# Patient Record
Sex: MALE | Race: BLACK OR AFRICAN AMERICAN | NOT HISPANIC OR LATINO
[De-identification: names, ages, dates, MRNs, and addresses within clinical notes are randomized per-mention and may not be internally consistent; named-entity substitution may affect disease eponyms.]

---

## 2017-01-27 ENCOUNTER — APPOINTMENT (OUTPATIENT)
Dept: ORTHOPEDIC SURGERY | Facility: CLINIC | Age: 62
End: 2017-01-27

## 2017-02-23 ENCOUNTER — APPOINTMENT (OUTPATIENT)
Dept: ELECTROPHYSIOLOGY | Facility: CLINIC | Age: 62
End: 2017-02-23

## 2017-03-27 ENCOUNTER — APPOINTMENT (OUTPATIENT)
Dept: ELECTROPHYSIOLOGY | Facility: CLINIC | Age: 62
End: 2017-03-27

## 2017-05-31 ENCOUNTER — NON-APPOINTMENT (OUTPATIENT)
Age: 62
End: 2017-05-31

## 2017-05-31 ENCOUNTER — APPOINTMENT (OUTPATIENT)
Dept: ELECTROPHYSIOLOGY | Facility: CLINIC | Age: 62
End: 2017-05-31

## 2017-05-31 VITALS — DIASTOLIC BLOOD PRESSURE: 83 MMHG | HEART RATE: 72 BPM | OXYGEN SATURATION: 96 % | SYSTOLIC BLOOD PRESSURE: 131 MMHG

## 2017-07-25 ENCOUNTER — CHART COPY (OUTPATIENT)
Age: 62
End: 2017-07-25

## 2017-08-31 ENCOUNTER — APPOINTMENT (OUTPATIENT)
Dept: ELECTROPHYSIOLOGY | Facility: CLINIC | Age: 62
End: 2017-08-31
Payer: MEDICARE

## 2017-08-31 PROCEDURE — 93295 DEV INTERROG REMOTE 1/2/MLT: CPT

## 2017-09-06 ENCOUNTER — OUTPATIENT (OUTPATIENT)
Dept: OUTPATIENT SERVICES | Facility: HOSPITAL | Age: 62
LOS: 1 days | End: 2017-09-06
Payer: MEDICARE

## 2017-09-06 ENCOUNTER — APPOINTMENT (OUTPATIENT)
Dept: ULTRASOUND IMAGING | Facility: CLINIC | Age: 62
End: 2017-09-06
Payer: MEDICARE

## 2017-09-06 DIAGNOSIS — Z98.89 OTHER SPECIFIED POSTPROCEDURAL STATES: Chronic | ICD-10-CM

## 2017-09-06 DIAGNOSIS — Z95.810 PRESENCE OF AUTOMATIC (IMPLANTABLE) CARDIAC DEFIBRILLATOR: Chronic | ICD-10-CM

## 2017-09-06 DIAGNOSIS — R10.11 RIGHT UPPER QUADRANT PAIN: ICD-10-CM

## 2017-09-06 DIAGNOSIS — S55.001A UNSPECIFIED INJURY OF ULNAR ARTERY AT FOREARM LEVEL, RIGHT ARM, INITIAL ENCOUNTER: Chronic | ICD-10-CM

## 2017-09-06 PROCEDURE — 76700 US EXAM ABDOM COMPLETE: CPT | Mod: 26

## 2017-09-06 PROCEDURE — 76700 US EXAM ABDOM COMPLETE: CPT

## 2017-09-12 ENCOUNTER — APPOINTMENT (OUTPATIENT)
Dept: OTOLARYNGOLOGY | Facility: CLINIC | Age: 62
End: 2017-09-12
Payer: MEDICARE

## 2017-09-12 VITALS
DIASTOLIC BLOOD PRESSURE: 81 MMHG | BODY MASS INDEX: 28.93 KG/M2 | HEIGHT: 66 IN | HEART RATE: 71 BPM | WEIGHT: 180 LBS | SYSTOLIC BLOOD PRESSURE: 125 MMHG

## 2017-09-12 DIAGNOSIS — H93.12 TINNITUS, LEFT EAR: ICD-10-CM

## 2017-09-12 DIAGNOSIS — H61.21 IMPACTED CERUMEN, RIGHT EAR: ICD-10-CM

## 2017-09-12 PROCEDURE — 99214 OFFICE O/P EST MOD 30 MIN: CPT | Mod: 25

## 2017-09-12 PROCEDURE — 92567 TYMPANOMETRY: CPT

## 2017-09-12 PROCEDURE — 92557 COMPREHENSIVE HEARING TEST: CPT

## 2017-09-12 PROCEDURE — G0268 REMOVAL OF IMPACTED WAX MD: CPT

## 2017-10-02 ENCOUNTER — APPOINTMENT (OUTPATIENT)
Dept: OTOLARYNGOLOGY | Facility: CLINIC | Age: 62
End: 2017-10-02

## 2017-12-01 ENCOUNTER — APPOINTMENT (OUTPATIENT)
Dept: ELECTROPHYSIOLOGY | Facility: CLINIC | Age: 62
End: 2017-12-01
Payer: MEDICARE

## 2017-12-01 ENCOUNTER — NON-APPOINTMENT (OUTPATIENT)
Age: 62
End: 2017-12-01

## 2017-12-01 VITALS
DIASTOLIC BLOOD PRESSURE: 77 MMHG | HEART RATE: 74 BPM | OXYGEN SATURATION: 96 % | HEIGHT: 66 IN | SYSTOLIC BLOOD PRESSURE: 130 MMHG

## 2017-12-01 PROCEDURE — 93284 PRGRMG EVAL IMPLANTABLE DFB: CPT

## 2018-03-01 ENCOUNTER — APPOINTMENT (OUTPATIENT)
Dept: OTOLARYNGOLOGY | Facility: CLINIC | Age: 63
End: 2018-03-01
Payer: MEDICARE

## 2018-03-01 VITALS
SYSTOLIC BLOOD PRESSURE: 141 MMHG | HEART RATE: 92 BPM | WEIGHT: 182 LBS | DIASTOLIC BLOOD PRESSURE: 90 MMHG | HEIGHT: 66 IN | BODY MASS INDEX: 29.25 KG/M2

## 2018-03-01 DIAGNOSIS — R22.1 LOCALIZED SWELLING, MASS AND LUMP, NECK: ICD-10-CM

## 2018-03-01 DIAGNOSIS — K21.9 GASTRO-ESOPHAGEAL REFLUX DISEASE W/OUT ESOPHAGITIS: ICD-10-CM

## 2018-03-01 PROCEDURE — 99214 OFFICE O/P EST MOD 30 MIN: CPT | Mod: 25

## 2018-03-01 PROCEDURE — 31575 DIAGNOSTIC LARYNGOSCOPY: CPT

## 2018-03-06 ENCOUNTER — APPOINTMENT (OUTPATIENT)
Dept: ELECTROPHYSIOLOGY | Facility: CLINIC | Age: 63
End: 2018-03-06

## 2018-03-08 ENCOUNTER — APPOINTMENT (OUTPATIENT)
Dept: ELECTROPHYSIOLOGY | Facility: CLINIC | Age: 63
End: 2018-03-08
Payer: MEDICARE

## 2018-03-08 DIAGNOSIS — I50.9 HEART FAILURE, UNSPECIFIED: ICD-10-CM

## 2018-03-08 PROCEDURE — 93295 DEV INTERROG REMOTE 1/2/MLT: CPT

## 2018-03-20 ENCOUNTER — RESULT REVIEW (OUTPATIENT)
Age: 63
End: 2018-03-20

## 2018-03-20 ENCOUNTER — OUTPATIENT (OUTPATIENT)
Dept: OUTPATIENT SERVICES | Facility: HOSPITAL | Age: 63
LOS: 1 days | End: 2018-03-20
Payer: MEDICARE

## 2018-03-20 DIAGNOSIS — Z98.89 OTHER SPECIFIED POSTPROCEDURAL STATES: Chronic | ICD-10-CM

## 2018-03-20 DIAGNOSIS — Z95.810 PRESENCE OF AUTOMATIC (IMPLANTABLE) CARDIAC DEFIBRILLATOR: Chronic | ICD-10-CM

## 2018-03-20 DIAGNOSIS — D50.9 IRON DEFICIENCY ANEMIA, UNSPECIFIED: ICD-10-CM

## 2018-03-20 DIAGNOSIS — S55.001A UNSPECIFIED INJURY OF ULNAR ARTERY AT FOREARM LEVEL, RIGHT ARM, INITIAL ENCOUNTER: Chronic | ICD-10-CM

## 2018-03-20 PROCEDURE — 88312 SPECIAL STAINS GROUP 1: CPT

## 2018-03-20 PROCEDURE — 88305 TISSUE EXAM BY PATHOLOGIST: CPT | Mod: 26

## 2018-03-20 PROCEDURE — 43239 EGD BIOPSY SINGLE/MULTIPLE: CPT

## 2018-03-20 PROCEDURE — 88312 SPECIAL STAINS GROUP 1: CPT | Mod: 26

## 2018-03-20 PROCEDURE — 88305 TISSUE EXAM BY PATHOLOGIST: CPT

## 2018-03-21 LAB — SURGICAL PATHOLOGY STUDY: SIGNIFICANT CHANGE UP

## 2018-05-04 ENCOUNTER — APPOINTMENT (OUTPATIENT)
Dept: ORTHOPEDIC SURGERY | Facility: CLINIC | Age: 63
End: 2018-05-04
Payer: OTHER MISCELLANEOUS

## 2018-05-04 VITALS
WEIGHT: 182 LBS | SYSTOLIC BLOOD PRESSURE: 129 MMHG | DIASTOLIC BLOOD PRESSURE: 82 MMHG | BODY MASS INDEX: 29.25 KG/M2 | HEART RATE: 75 BPM | HEIGHT: 66 IN
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
## 2018-05-04 PROCEDURE — 99214 OFFICE O/P EST MOD 30 MIN: CPT

## 2018-06-04 ENCOUNTER — APPOINTMENT (OUTPATIENT)
Dept: ELECTROPHYSIOLOGY | Facility: CLINIC | Age: 63
End: 2018-06-04
Payer: MEDICARE

## 2018-06-04 VITALS
HEIGHT: 66 IN | DIASTOLIC BLOOD PRESSURE: 94 MMHG | SYSTOLIC BLOOD PRESSURE: 155 MMHG | WEIGHT: 182 LBS | HEART RATE: 70 BPM | OXYGEN SATURATION: 96 % | BODY MASS INDEX: 29.25 KG/M2

## 2018-06-04 PROCEDURE — 93284 PRGRMG EVAL IMPLANTABLE DFB: CPT

## 2018-06-04 RX ORDER — AMOXICILLIN AND CLAVULANATE POTASSIUM 875; 125 MG/1; MG/1
875-125 TABLET, COATED ORAL
Qty: 20 | Refills: 0 | Status: DISCONTINUED | COMMUNITY
Start: 2018-01-05 | End: 2018-06-04

## 2018-06-08 ENCOUNTER — CHART COPY (OUTPATIENT)
Age: 63
End: 2018-06-08

## 2018-09-13 ENCOUNTER — APPOINTMENT (OUTPATIENT)
Dept: ELECTROPHYSIOLOGY | Facility: CLINIC | Age: 63
End: 2018-09-13
Payer: MEDICARE

## 2018-09-13 DIAGNOSIS — M50.20 OTHER CERVICAL DISC DISPLACEMENT, UNSPECIFIED CERVICAL REGION: ICD-10-CM

## 2018-09-13 PROCEDURE — 93294 REM INTERROG EVL PM/LDLS PM: CPT

## 2018-12-04 ENCOUNTER — APPOINTMENT (OUTPATIENT)
Dept: ELECTROPHYSIOLOGY | Facility: CLINIC | Age: 63
End: 2018-12-04
Payer: MEDICARE

## 2018-12-04 VITALS
OXYGEN SATURATION: 96 % | HEIGHT: 66 IN | BODY MASS INDEX: 29.73 KG/M2 | SYSTOLIC BLOOD PRESSURE: 157 MMHG | WEIGHT: 185 LBS | HEART RATE: 64 BPM | DIASTOLIC BLOOD PRESSURE: 92 MMHG

## 2018-12-04 PROCEDURE — 93284 PRGRMG EVAL IMPLANTABLE DFB: CPT

## 2019-04-11 ENCOUNTER — MEDICATION RENEWAL (OUTPATIENT)
Age: 64
End: 2019-04-11

## 2019-05-14 ENCOUNTER — APPOINTMENT (OUTPATIENT)
Dept: ENDOCRINOLOGY | Facility: CLINIC | Age: 64
End: 2019-05-14
Payer: MEDICARE

## 2019-05-14 VITALS
WEIGHT: 184.19 LBS | OXYGEN SATURATION: 97 % | TEMPERATURE: 98.6 F | HEIGHT: 66 IN | DIASTOLIC BLOOD PRESSURE: 84 MMHG | HEART RATE: 72 BPM | BODY MASS INDEX: 29.6 KG/M2 | SYSTOLIC BLOOD PRESSURE: 152 MMHG

## 2019-05-14 PROCEDURE — 82962 GLUCOSE BLOOD TEST: CPT

## 2019-05-14 PROCEDURE — 99214 OFFICE O/P EST MOD 30 MIN: CPT | Mod: 25

## 2019-05-14 PROCEDURE — 83036 HEMOGLOBIN GLYCOSYLATED A1C: CPT | Mod: QW

## 2019-05-14 RX ORDER — IBUPROFEN 800 MG/1
800 TABLET, FILM COATED ORAL EVERY 8 HOURS
Qty: 90 | Refills: 1 | Status: DISCONTINUED | COMMUNITY
Start: 2017-07-25 | End: 2019-05-14

## 2019-05-14 RX ORDER — METHYLPREDNISOLONE 4 MG/1
4 TABLET ORAL
Qty: 1 | Refills: 0 | Status: DISCONTINUED | COMMUNITY
Start: 2018-03-01 | End: 2019-05-14

## 2019-05-14 RX ORDER — PROMETHAZINE AND PHENYLEPHRINE HYDROCHLORIDE AND CODEINE PHOSPHATE 10; 6.25; 5 MG/5ML; MG/5ML; MG/5ML
6.25-5-1 SOLUTION ORAL
Qty: 120 | Refills: 0 | Status: DISCONTINUED | COMMUNITY
Start: 2018-01-05 | End: 2019-05-14

## 2019-05-14 RX ORDER — BENZONATATE 200 MG/1
200 CAPSULE ORAL
Qty: 40 | Refills: 0 | Status: DISCONTINUED | COMMUNITY
Start: 2018-01-05 | End: 2019-05-14

## 2019-05-21 ENCOUNTER — APPOINTMENT (OUTPATIENT)
Dept: CARDIOLOGY | Facility: CLINIC | Age: 64
End: 2019-05-21
Payer: MEDICARE

## 2019-05-21 ENCOUNTER — NON-APPOINTMENT (OUTPATIENT)
Age: 64
End: 2019-05-21

## 2019-05-21 VITALS
OXYGEN SATURATION: 98 % | HEIGHT: 66 IN | HEART RATE: 71 BPM | TEMPERATURE: 98.5 F | DIASTOLIC BLOOD PRESSURE: 80 MMHG | WEIGHT: 183.3 LBS | SYSTOLIC BLOOD PRESSURE: 130 MMHG | BODY MASS INDEX: 29.46 KG/M2

## 2019-05-21 DIAGNOSIS — R70.0 ELEVATED ERYTHROCYTE SEDIMENTATION RATE: ICD-10-CM

## 2019-05-21 PROCEDURE — 99213 OFFICE O/P EST LOW 20 MIN: CPT

## 2019-05-21 PROCEDURE — 93000 ELECTROCARDIOGRAM COMPLETE: CPT

## 2019-05-21 NOTE — HISTORY OF PRESENT ILLNESS
[de-identified] : This is a 64 year old  gentlemen who has a PMH of HTN, HLD, DM, Cardiomyopathy, and Defibrillator in place. Patient states he is feeling good and has no complaints at this time. Patient denies dyspnea, palpitations, chest pain, nausea, vomiting, dizziness and lightheadedness.\par

## 2019-05-26 LAB
ANION GAP SERPL CALC-SCNC: 14 MMOL/L
APPEARANCE: CLEAR
BACTERIA: NEGATIVE
BASOPHILS # BLD AUTO: 0.01 K/UL
BASOPHILS NFR BLD AUTO: 0.3 %
BILIRUBIN URINE: NEGATIVE
BLOOD URINE: NEGATIVE
BUN SERPL-MCNC: 21 MG/DL
CALCIUM SERPL-MCNC: 10.2 MG/DL
CHLORIDE SERPL-SCNC: 101 MMOL/L
CK SERPL-CCNC: 242 U/L
CO2 SERPL-SCNC: 26 MMOL/L
COLOR: NORMAL
CREAT SERPL-MCNC: 1.24 MG/DL
CREAT SPEC-SCNC: 99 MG/DL
EOSINOPHIL # BLD AUTO: 0.07 K/UL
EOSINOPHIL NFR BLD AUTO: 1.8 %
GLUCOSE QUALITATIVE U: ABNORMAL
GLUCOSE SERPL-MCNC: 157 MG/DL
HCT VFR BLD CALC: 50.1 %
HGB BLD-MCNC: 16.5 G/DL
HYALINE CASTS: 1 /LPF
IMM GRANULOCYTES NFR BLD AUTO: 0.3 %
KETONES URINE: NEGATIVE
LEUKOCYTE ESTERASE URINE: NEGATIVE
LYMPHOCYTES # BLD AUTO: 1.51 K/UL
LYMPHOCYTES NFR BLD AUTO: 37.8 %
MAN DIFF?: NORMAL
MCHC RBC-ENTMCNC: 30 PG
MCHC RBC-ENTMCNC: 32.9 GM/DL
MCV RBC AUTO: 91.1 FL
MICROALBUMIN 24H UR DL<=1MG/L-MCNC: 2.3 MG/DL
MICROALBUMIN/CREAT 24H UR-RTO: 23 MG/G
MICROSCOPIC-UA: NORMAL
MONOCYTES # BLD AUTO: 0.64 K/UL
MONOCYTES NFR BLD AUTO: 16 %
NEUTROPHILS # BLD AUTO: 1.76 K/UL
NEUTROPHILS NFR BLD AUTO: 43.8 %
NITRITE URINE: NEGATIVE
PH URINE: 6
PLATELET # BLD AUTO: 150 K/UL
POTASSIUM SERPL-SCNC: 4.7 MMOL/L
PROTEIN URINE: NORMAL
RBC # BLD: 5.5 M/UL
RBC # FLD: 14.3 %
RED BLOOD CELLS URINE: 0 /HPF
SODIUM SERPL-SCNC: 141 MMOL/L
SPECIFIC GRAVITY URINE: 1.02
SQUAMOUS EPITHELIAL CELLS: 0 /HPF
UROBILINOGEN URINE: NORMAL
WBC # FLD AUTO: 4 K/UL
WHITE BLOOD CELLS URINE: 1 /HPF

## 2019-05-30 ENCOUNTER — RX RENEWAL (OUTPATIENT)
Age: 64
End: 2019-05-30

## 2019-05-31 LAB
25(OH)D3 SERPL-MCNC: 30.3 NG/ML
ALBUMIN SERPL ELPH-MCNC: 4.6 G/DL
ALP BLD-CCNC: 61 U/L
ALT SERPL-CCNC: 23 U/L
ANION GAP SERPL CALC-SCNC: 14 MMOL/L
AST SERPL-CCNC: 24 U/L
BILIRUB SERPL-MCNC: 0.6 MG/DL
BUN SERPL-MCNC: 14 MG/DL
CALCIT SERPL-MCNC: 5.3 PG/ML
CALCIUM SERPL-MCNC: 9.8 MG/DL
CHLORIDE SERPL-SCNC: 100 MMOL/L
CO2 SERPL-SCNC: 27 MMOL/L
CREAT SERPL-MCNC: 0.97 MG/DL
CREAT SPEC-SCNC: <2 MG/DL
ESTIMATED AVERAGE GLUCOSE: 192 MG/DL
FRUCTOSAMINE SERPL-MCNC: 367 UMOL/L
GLUCOSE BLDC GLUCOMTR-MCNC: 126
GLUCOSE SERPL-MCNC: 120 MG/DL
GLYCOMARK.: 3.2 UG/ML
HBA1C MFR BLD HPLC: 7.9
HBA1C MFR BLD HPLC: 8.3 %
HDLC SERPL-MCNC: 69 MG/DL
LDLC SERPL DIRECT ASSAY-MCNC: 135 MG/DL
MICROALBUMIN 24H UR DL<=1MG/L-MCNC: <1.2 MG/DL
MICROALBUMIN/CREAT 24H UR-RTO: NORMAL MG/G
POTASSIUM SERPL-SCNC: 4.6 MMOL/L
PROT SERPL-MCNC: 7.6 G/DL
SODIUM SERPL-SCNC: 141 MMOL/L
T4 FREE SERPL-MCNC: 1.3 NG/DL
TRIGL SERPL-MCNC: 86 MG/DL
TSH SERPL-ACNC: 2.33 UIU/ML

## 2019-06-04 ENCOUNTER — APPOINTMENT (OUTPATIENT)
Dept: ELECTROPHYSIOLOGY | Facility: CLINIC | Age: 64
End: 2019-06-04
Payer: MEDICARE

## 2019-06-04 ENCOUNTER — APPOINTMENT (OUTPATIENT)
Dept: ELECTROPHYSIOLOGY | Facility: CLINIC | Age: 64
End: 2019-06-04

## 2019-06-04 VITALS
HEIGHT: 66 IN | SYSTOLIC BLOOD PRESSURE: 129 MMHG | WEIGHT: 180 LBS | HEART RATE: 73 BPM | BODY MASS INDEX: 28.93 KG/M2 | OXYGEN SATURATION: 96 % | DIASTOLIC BLOOD PRESSURE: 80 MMHG

## 2019-06-04 PROCEDURE — 93284 PRGRMG EVAL IMPLANTABLE DFB: CPT

## 2019-06-15 LAB
APPEARANCE: CLEAR
BILIRUBIN URINE: NEGATIVE
BLOOD URINE: NEGATIVE
COLOR: NORMAL
CREAT SPEC-SCNC: 128 MG/DL
GLUCOSE QUALITATIVE U: NORMAL
KETONES URINE: NEGATIVE
LEUKOCYTE ESTERASE URINE: NEGATIVE
MICROALBUMIN 24H UR DL<=1MG/L-MCNC: <1.2 MG/DL
MICROALBUMIN/CREAT 24H UR-RTO: NORMAL MG/G
NITRITE URINE: NEGATIVE
PH URINE: 6
PROTEIN URINE: NEGATIVE
SPECIFIC GRAVITY URINE: 1.01
UROBILINOGEN URINE: NORMAL

## 2019-07-01 ENCOUNTER — APPOINTMENT (OUTPATIENT)
Dept: ELECTROPHYSIOLOGY | Facility: CLINIC | Age: 64
End: 2019-07-01
Payer: MEDICARE

## 2019-07-01 ENCOUNTER — NON-APPOINTMENT (OUTPATIENT)
Age: 64
End: 2019-07-01

## 2019-07-01 VITALS
DIASTOLIC BLOOD PRESSURE: 81 MMHG | HEIGHT: 66 IN | HEART RATE: 81 BPM | SYSTOLIC BLOOD PRESSURE: 135 MMHG | OXYGEN SATURATION: 95 %

## 2019-07-01 PROCEDURE — 99205 OFFICE O/P NEW HI 60 MIN: CPT | Mod: 25

## 2019-07-01 PROCEDURE — 93000 ELECTROCARDIOGRAM COMPLETE: CPT | Mod: 59

## 2019-07-01 PROCEDURE — 93284 PRGRMG EVAL IMPLANTABLE DFB: CPT

## 2019-07-01 RX ORDER — OLMESARTAN MEDOXOMIL 20 MG/1
20 TABLET, FILM COATED ORAL
Qty: 90 | Refills: 0 | Status: DISCONTINUED | COMMUNITY
Start: 2019-04-11 | End: 2019-07-01

## 2019-07-01 RX ORDER — CHROMIUM 200 MCG
1000 TABLET ORAL DAILY
Refills: 0 | Status: ACTIVE | COMMUNITY
Start: 2019-07-01

## 2019-07-03 NOTE — HISTORY OF PRESENT ILLNESS
[FreeTextEntry1] : Mr. LO  is a 64 year year old male who returns today for endocrine reevaluation.  Patient returns with regard to  a history of type 2 dm present for about 18 months  Bg at home run inm low 100's..  Additional history includes that of htn  and hyperlipidemia\par Today's A1c was 7.9%. Diet not overloaded in carbs except rice\par ADmits he doesn’t exercise enough. Is to join gym. Opthp due i nJuly-no retinal history.\par History of Vitamin d d deficiency.\par \par going to [facility] to visit home.\par Notes some burning feet.\par

## 2019-07-05 NOTE — DISCUSSION/SUMMARY
[FreeTextEntry1] : In summary the patient is a 64-year-old gentleman with a history of cardiomyopathy and heart failure status post placement of a biventricular ICD. The patient has done well with recent synchronization. He is now demonstrated noise on his atrial and ventricular ICD lead and his LV lead is actually on advisory. We discussed the potential options of doing nothing, and abandoning leads and replacing them, or extraction with reimplantation. The patient already has 3 Leads in Pl. and abandoning his atrial and ICD lead and leaving the LV lead which is on advisory, and at the same time adding to her leads would give him 5 leads through his SVC. We discussed the procedures, outcomes and risks of extraction at length. The risks discussed included but were not limited to bleeding, infection, vascular damage, AV fistula, cardiac damage, valvular damage, need for emergent surgery and death. After all questions were answered the patient has opted to proceed with extraction and reimplantation.

## 2019-07-05 NOTE — REASON FOR VISIT
[Initial Evaluation] : an initial evaluation of [Cardiomyopathy] : cardiomyopathy [FreeTextEntry1] : BiV ICD

## 2019-07-05 NOTE — HISTORY OF PRESENT ILLNESS
[FreeTextEntry1] : I had the pleasure of seeing your patient Yash Camara today in the arrhythmia clinic of [facility]. As you well know the patient is a delightful 64-year-old gentleman with a history of hypertension, hyperlipidemia, diabetes, and dilated cardiomyopathy. The patient also has a history of congestive heart failure. He has an underlying left bundle branch block and underwent implantation of a biventricular ICD in 2009. In followup the patient was found to have significant atrial lead noise in addition ventricular lead noise. He was therefore referred for evaluation for extraction and reimplantation. His last echocardiogram was in April of 2018. He had mild concentric LVH with a left atrium of 4.2 cm. his ejection fraction had improved to 45-50% with Biv pacing. \par \par ICD: [employer] Harj[employer] 3357-40, implanted 9/17/15, serial number [id number]\par Atrial lead: [employer] 1888 TC, serial number [id number] implanted 9/17/09\par ICD lead: [employer] Harj[employer]'s 7121, serial [id number], implanted 9/17/09\par LV lead: [employer] Harjinder 1156T, serial [id number], implanted 9/17/09\par \par Today in clinic the patient reports overall doing well. He denies any palpitations, lightheadedness, presyncope or syncope. His blood pressure today was 135/81 and his pulse was 81 and regular appears EKG revealed atrial sensing with IV pacing at 80 beats per minute. Interrogation of his biventricular [employer] Harjinder defibrillator reveals a smoking normally. The battery voltage estimates longevity of 3 years. The P wave was 4.6 mV with impedance of 360 ohms threshold 0.75 of 0.5 ms. The R wave is greater than 12 mV with an RV impedance of 700 ohms threshold 0.5 V at chair 0.5 ms. The LV threshold was 1.25 V at 1 ms. He is bright V. pacing greater than 99% of the time and there were no interval arrhythmic events.

## 2019-07-22 ENCOUNTER — OUTPATIENT (OUTPATIENT)
Dept: OUTPATIENT SERVICES | Facility: HOSPITAL | Age: 64
LOS: 1 days | End: 2019-07-22
Payer: MEDICARE

## 2019-07-22 ENCOUNTER — TRANSCRIPTION ENCOUNTER (OUTPATIENT)
Age: 64
End: 2019-07-22

## 2019-07-22 VITALS
WEIGHT: 182.1 LBS | TEMPERATURE: 98 F | DIASTOLIC BLOOD PRESSURE: 93 MMHG | HEART RATE: 78 BPM | OXYGEN SATURATION: 97 % | HEIGHT: 66 IN | RESPIRATION RATE: 18 BRPM | SYSTOLIC BLOOD PRESSURE: 157 MMHG

## 2019-07-22 DIAGNOSIS — Z95.810 PRESENCE OF AUTOMATIC (IMPLANTABLE) CARDIAC DEFIBRILLATOR: Chronic | ICD-10-CM

## 2019-07-22 DIAGNOSIS — S55.001A UNSPECIFIED INJURY OF ULNAR ARTERY AT FOREARM LEVEL, RIGHT ARM, INITIAL ENCOUNTER: Chronic | ICD-10-CM

## 2019-07-22 DIAGNOSIS — Z29.9 ENCOUNTER FOR PROPHYLACTIC MEASURES, UNSPECIFIED: ICD-10-CM

## 2019-07-22 DIAGNOSIS — G47.33 OBSTRUCTIVE SLEEP APNEA (ADULT) (PEDIATRIC): ICD-10-CM

## 2019-07-22 DIAGNOSIS — Z98.89 OTHER SPECIFIED POSTPROCEDURAL STATES: Chronic | ICD-10-CM

## 2019-07-22 DIAGNOSIS — T82.110A BREAKDOWN (MECHANICAL) OF CARDIAC ELECTRODE, INITIAL ENCOUNTER: ICD-10-CM

## 2019-07-22 LAB
ANION GAP SERPL CALC-SCNC: 16 MMOL/L — SIGNIFICANT CHANGE UP (ref 5–17)
BLD GP AB SCN SERPL QL: NEGATIVE — SIGNIFICANT CHANGE UP
BUN SERPL-MCNC: 19 MG/DL — SIGNIFICANT CHANGE UP (ref 7–23)
CALCIUM SERPL-MCNC: 9.5 MG/DL — SIGNIFICANT CHANGE UP (ref 8.4–10.5)
CHLORIDE SERPL-SCNC: 103 MMOL/L — SIGNIFICANT CHANGE UP (ref 96–108)
CO2 SERPL-SCNC: 24 MMOL/L — SIGNIFICANT CHANGE UP (ref 22–31)
CREAT SERPL-MCNC: 1.03 MG/DL — SIGNIFICANT CHANGE UP (ref 0.5–1.3)
GLUCOSE SERPL-MCNC: 206 MG/DL — HIGH (ref 70–99)
HCT VFR BLD CALC: 47.8 % — SIGNIFICANT CHANGE UP (ref 39–50)
HGB BLD-MCNC: 16.3 G/DL — SIGNIFICANT CHANGE UP (ref 13–17)
MCHC RBC-ENTMCNC: 30.1 PG — SIGNIFICANT CHANGE UP (ref 27–34)
MCHC RBC-ENTMCNC: 34.1 GM/DL — SIGNIFICANT CHANGE UP (ref 32–36)
MCV RBC AUTO: 88.4 FL — SIGNIFICANT CHANGE UP (ref 80–100)
PLATELET # BLD AUTO: 181 K/UL — SIGNIFICANT CHANGE UP (ref 150–400)
POTASSIUM SERPL-MCNC: 4.3 MMOL/L — SIGNIFICANT CHANGE UP (ref 3.5–5.3)
POTASSIUM SERPL-SCNC: 4.3 MMOL/L — SIGNIFICANT CHANGE UP (ref 3.5–5.3)
RBC # BLD: 5.41 M/UL — SIGNIFICANT CHANGE UP (ref 4.2–5.8)
RBC # FLD: 15.1 % — HIGH (ref 10.3–14.5)
RH IG SCN BLD-IMP: POSITIVE — SIGNIFICANT CHANGE UP
SODIUM SERPL-SCNC: 143 MMOL/L — SIGNIFICANT CHANGE UP (ref 135–145)
WBC # BLD: 3.24 K/UL — LOW (ref 3.8–10.5)
WBC # FLD AUTO: 3.24 K/UL — LOW (ref 3.8–10.5)

## 2019-07-22 PROCEDURE — 80048 BASIC METABOLIC PNL TOTAL CA: CPT

## 2019-07-22 PROCEDURE — 85027 COMPLETE CBC AUTOMATED: CPT

## 2019-07-22 PROCEDURE — 86901 BLOOD TYPING SEROLOGIC RH(D): CPT

## 2019-07-22 PROCEDURE — 86900 BLOOD TYPING SEROLOGIC ABO: CPT

## 2019-07-22 PROCEDURE — 71046 X-RAY EXAM CHEST 2 VIEWS: CPT | Mod: 26

## 2019-07-22 PROCEDURE — 86850 RBC ANTIBODY SCREEN: CPT

## 2019-07-22 PROCEDURE — 86923 COMPATIBILITY TEST ELECTRIC: CPT

## 2019-07-22 PROCEDURE — 71046 X-RAY EXAM CHEST 2 VIEWS: CPT

## 2019-07-22 PROCEDURE — G0463: CPT

## 2019-07-22 NOTE — H&P PST ADULT - NSANTHOSAYNRD_GEN_A_CORE
No. YAYO screening performed.  STOP BANG Legend: 0-2 = LOW Risk; 3-4 = INTERMEDIATE Risk; 5-8 = HIGH Risk No. YAYO screening performed.  STOP BANG Legend: 0-2 = LOW Risk; 3-4 = INTERMEDIATE Risk; 5-8 = HIGH Risk/criteria

## 2019-07-22 NOTE — H&P PST ADULT - ASSESSMENT
1.	Right Upper Quadrant Abdominal Pain  2.	Long term aspirin use  3.	AICD in place CAPRINI SCORE [CLOT updated 18]    AGE RELATED RISK FACTORS                                                       MOBILITY RELATED FACTORS  [ ] Age 41-60 years                                            (1 Point)                    [ ] Bed rest                                                        (1 Point)  x[ ] Age: 61-74 years                                           (2 Points)                  [ ] Plaster cast                                                   (2 Points)  [ ] Age= 75 years                                              (3 Points)                    [ ] Bed bound for more than 72 hours                 (2 Points)    DISEASE RELATED RISK FACTORS                                               GENDER SPECIFIC FACTORS  [ ] Edema in the lower extremities                       (1 Point)              [ ] Pregnancy                                                     (1 Point)  [ ] Varicose veins                                               (1 Point)                     [ ] Post-partum < 6 weeks                                   (1 Point)             [x ] BMI > 25 Kg/m2                                            (1 Point)                     [ ] Hormonal therapy  or oral contraception          (1 Point)                 [ ] Sepsis (in the previous month)                        (1 Point)               [ ] History of pregnancy complications                 (1 point)  [ ] Pneumonia or serious lung disease                                               [ ] Unexplained or recurrent                     (1 Point)           (in the previous month)                               (1 Point)  [ ] Abnormal pulmonary function test                     (1 Point)                 SURGERY RELATED RISK FACTORS  [ ] Acute myocardial infarction                              (1 Point)               [ ]  Section                                             (1 Point)  [ ] Congestive heart failure (in the previous month)  (1 Point)      [ ] Minor surgery                                                  (1 Point)   [ ] Inflammatory bowel disease                             (1 Point)               [ ] Arthroscopic surgery                                        (2 Points)  [ ] Central venous access                                      (2 Points)                [x ] General surgery lasting more than 45 minutes (2 points)  [ ] Present or previous malignancy                     (2 Points)                [ ] Elective arthroplasty                                         (5 points)    [ ] Stroke (in the previous month)                          (5 Points)                                                                                                                                                           HEMATOLOGY RELATED FACTORS                                                 TRAUMA RELATED RISK FACTORS  [ ] Prior episodes of VTE                                     (3 Points)                [ ] Fracture of the hip, pelvis, or leg                       (5 Points)  [ ] Positive family history for VTE                         (3 Points)             [ ] Acute spinal cord injury (in the previous month)  (5 Points)  [ ] Prothrombin 32777 A                                     (3 Points)               [ ] Paralysis  (less than 1 month)                             (5 Points)  [ ] Factor V Leiden                                             (3 Points)                  [ ] Multiple Trauma within 1 month                        (5 Points)  [ ] Lupus anticoagulants                                     (3 Points)                                                           [ ] Anticardiolipin antibodies                               (3 Points)                                                       [ ] High homocysteine in the blood                      (3 Points)                                             [ ] Other congenital or acquired thrombophilia      (3 Points)                                                [ ] Heparin induced thrombocytopenia                  (3 Points)                                     Total Score [      5    ]

## 2019-07-22 NOTE — H&P PST ADULT - EJECTION FRACTION >40 YES
Otc Regimen: Lamisil cream Plan: -patient states she has been using Triamcinolone oint bid \\n-patient states she is still very itchy and bumps are still present at today's visit \\n-picture taken at today's visit \\n-will do a bx at today's visit \\n-advised patient this could be fungal \\n-advised patient to stop Triamcinolone oint at today's visit\\n-recommended OTC Lamisil cream \\n-will then treat rash when we receive bx results Detail Level: Zone Ejection Fraction...

## 2019-07-22 NOTE — H&P PST ADULT - NSICDXPASTMEDICALHX_GEN_ALL_CORE_FT
PAST MEDICAL HISTORY:  Cardiomyopathy     Cardiomyopathy AICD in place    Diverticulitis past history    Diverticulosis     Dyslipidemia     GERD (gastroesophageal reflux disease)     Hyperlipidemia     Hypertension     Hypertension     Lumbar radiculopathy PAST MEDICAL HISTORY:  Cardiomyopathy     Cardiomyopathy       Diverticulitis past history    Diverticulosis     Dyslipidemia     GERD (gastroesophageal reflux disease)     Hyperlipidemia     Hypertension     Hypertension     Lumbar radiculopathy PAST MEDICAL HISTORY:  Cardiomyopathy     Cardiomyopathy       Diverticulitis past history    Diverticulosis     Dyslipidemia     GERD (gastroesophageal reflux disease)     History of hemochromatosis ? pt followed by hematologist no treatmnet -lab values is normal as per patient    Hyperlipidemia     Hypertension     Hypertension     Lumbar radiculopathy

## 2019-07-22 NOTE — H&P PST ADULT - LAST ECHOCARDIOGRAM
2018 2018-His last echocardiogram was in April of 2018. He had mild concentric LVH with a left atrium of 4.2 cm. his ejection fraction had improved to 45-50% with Biv pacing.

## 2019-07-22 NOTE — H&P PST ADULT - NSICDXPROBLEM_GEN_ALL_CORE_FT
PROBLEM DIAGNOSES  Problem: Mechanical breakdown of cardiac electrode, initial encounter  Assessment and Plan: BIV ICD Lead Extraction and Reimplant on 7/23/19  Pre- Op Instructions discussed   Labs sent   PPM last interrogation report on file     Problem: YAYO (obstructive sleep apnea)  Assessment and Plan: OR booking notified  yayo precautions     Problem: Need for prophylactic measure  Assessment and Plan: The Caprini score indicates this patient is at risk for a VTE event (score 3-5).  Most surgical patients in this group would benefit from pharmacologic prophylaxis.  The surgical team will determine the balance between VTE risk and bleeding risk

## 2019-07-22 NOTE — H&P PST ADULT - ACTIVITY
ADLs, walks up 1-2 flights of stairs, rakes leaves, walks 15 min at moderate pace - activity somewhat limited by LBP

## 2019-07-22 NOTE — H&P PST ADULT - HISTORY OF PRESENT ILLNESS
63 yo male with h/o HTN, HLD, cardiomyopathy (AICD in place) and c/o intermittent right upper quadrant abdominal pain presents for endoscopy and colonoscopy. 64-year-old gentleman with a history of hypertension, hyperlipidemia, pre-diabetes, and dilated cardiomyopathy. The patient also has a history of congestive heart failure. He has an underlying left bundle branch block and underwent implantation of a biventricular ICD in 2009 ,battery changed in 2015. In follow up the patient was found to have significant atrial lead noise in addition ventricular lead noise. He was therefore referred for evaluation for extraction and reimplantation. Presents to PST for scheduled BIV ICD Lead Extraction and Re Implant on 7/23/19.      His last echocardiogram was in April of 2018. He had mild concentric LVH with a left atrium of 4.2 cm. his ejection fraction had improved to 45-50% with Biv pacing. 64-year-old gentleman with a history of hypertension, hyperlipidemia, pre-diabetes, and dilated cardiomyopathy. The patient also has a history of congestive heart failure. He has an underlying left bundle branch block and underwent implantation of a biventricular ICD in 2009 ,battery changed in 2015. In follow up the patient was found to have significant atrial lead noise in addition ventricular lead noise. He was therefore referred for evaluation for extraction and reimplantation. Presents to PST for scheduled BIV ICD Lead Extraction and Re Implant on 7/23/19.

## 2019-07-22 NOTE — H&P PST ADULT - NSICDXPASTSURGICALHX_GEN_ALL_CORE_FT
PAST SURGICAL HISTORY:  AICD (automatic cardioverter/defibrillator) present implanted 9/2009  left chest   model # 3207- 36    AICD (automatic cardioverter/defibrillator) present 2009, battery change 9/2015    History of lumbar laminectomy x2 - 2000, 2001 - s/p accident at work    Injury of right ulnar artery s/p surgical repair 2009

## 2019-07-23 ENCOUNTER — INPATIENT (INPATIENT)
Facility: HOSPITAL | Age: 64
LOS: 1 days | Discharge: HOME | DRG: 226 | End: 2019-07-25
Attending: INTERNAL MEDICINE | Admitting: INTERNAL MEDICINE
Payer: MEDICARE

## 2019-07-23 ENCOUNTER — TRANSCRIPTION ENCOUNTER (OUTPATIENT)
Age: 64
End: 2019-07-23

## 2019-07-23 VITALS
SYSTOLIC BLOOD PRESSURE: 147 MMHG | TEMPERATURE: 98 F | DIASTOLIC BLOOD PRESSURE: 79 MMHG | WEIGHT: 182.76 LBS | RESPIRATION RATE: 20 BRPM | OXYGEN SATURATION: 96 % | HEART RATE: 77 BPM | HEIGHT: 66 IN

## 2019-07-23 DIAGNOSIS — T82.110A BREAKDOWN (MECHANICAL) OF CARDIAC ELECTRODE, INITIAL ENCOUNTER: ICD-10-CM

## 2019-07-23 DIAGNOSIS — Z95.810 PRESENCE OF AUTOMATIC (IMPLANTABLE) CARDIAC DEFIBRILLATOR: Chronic | ICD-10-CM

## 2019-07-23 DIAGNOSIS — S55.001A UNSPECIFIED INJURY OF ULNAR ARTERY AT FOREARM LEVEL, RIGHT ARM, INITIAL ENCOUNTER: Chronic | ICD-10-CM

## 2019-07-23 DIAGNOSIS — Z98.89 OTHER SPECIFIED POSTPROCEDURAL STATES: Chronic | ICD-10-CM

## 2019-07-23 LAB
ALBUMIN SERPL ELPH-MCNC: 3.6 G/DL — SIGNIFICANT CHANGE UP (ref 3.3–5)
ALP SERPL-CCNC: 37 U/L — LOW (ref 40–120)
ALT FLD-CCNC: 15 U/L — SIGNIFICANT CHANGE UP (ref 10–45)
ANION GAP SERPL CALC-SCNC: 12 MMOL/L — SIGNIFICANT CHANGE UP (ref 5–17)
APTT BLD: 24.5 SEC — LOW (ref 27.5–36.3)
AST SERPL-CCNC: 27 U/L — SIGNIFICANT CHANGE UP (ref 10–40)
BASOPHILS # BLD AUTO: 0 K/UL — SIGNIFICANT CHANGE UP (ref 0–0.2)
BASOPHILS NFR BLD AUTO: 1.1 % — SIGNIFICANT CHANGE UP (ref 0–2)
BILIRUB SERPL-MCNC: 0.8 MG/DL — SIGNIFICANT CHANGE UP (ref 0.2–1.2)
BUN SERPL-MCNC: 16 MG/DL — SIGNIFICANT CHANGE UP (ref 7–23)
CALCIUM SERPL-MCNC: 8.7 MG/DL — SIGNIFICANT CHANGE UP (ref 8.4–10.5)
CHLORIDE SERPL-SCNC: 108 MMOL/L — SIGNIFICANT CHANGE UP (ref 96–108)
CO2 SERPL-SCNC: 22 MMOL/L — SIGNIFICANT CHANGE UP (ref 22–31)
CREAT SERPL-MCNC: 0.94 MG/DL — SIGNIFICANT CHANGE UP (ref 0.5–1.3)
EOSINOPHIL # BLD AUTO: 0.1 K/UL — SIGNIFICANT CHANGE UP (ref 0–0.5)
EOSINOPHIL NFR BLD AUTO: 1.6 % — SIGNIFICANT CHANGE UP (ref 0–6)
GLUCOSE SERPL-MCNC: 149 MG/DL — HIGH (ref 70–99)
HCT VFR BLD CALC: 41.9 % — SIGNIFICANT CHANGE UP (ref 39–50)
HGB BLD-MCNC: 14.6 G/DL — SIGNIFICANT CHANGE UP (ref 13–17)
INR BLD: 1.03 RATIO — SIGNIFICANT CHANGE UP (ref 0.88–1.16)
LYMPHOCYTES # BLD AUTO: 0.9 K/UL — LOW (ref 1–3.3)
LYMPHOCYTES # BLD AUTO: 19.3 % — SIGNIFICANT CHANGE UP (ref 13–44)
MAGNESIUM SERPL-MCNC: 1.9 MG/DL — SIGNIFICANT CHANGE UP (ref 1.6–2.6)
MCHC RBC-ENTMCNC: 31.8 PG — SIGNIFICANT CHANGE UP (ref 27–34)
MCHC RBC-ENTMCNC: 35 GM/DL — SIGNIFICANT CHANGE UP (ref 32–36)
MCV RBC AUTO: 91 FL — SIGNIFICANT CHANGE UP (ref 80–100)
MONOCYTES # BLD AUTO: 0.5 K/UL — SIGNIFICANT CHANGE UP (ref 0–0.9)
MONOCYTES NFR BLD AUTO: 11.6 % — SIGNIFICANT CHANGE UP (ref 2–14)
NEUTROPHILS # BLD AUTO: 3 K/UL — SIGNIFICANT CHANGE UP (ref 1.8–7.4)
NEUTROPHILS NFR BLD AUTO: 66.4 % — SIGNIFICANT CHANGE UP (ref 43–77)
PHOSPHATE SERPL-MCNC: 4.3 MG/DL — SIGNIFICANT CHANGE UP (ref 2.5–4.5)
PLATELET # BLD AUTO: 135 K/UL — LOW (ref 150–400)
POTASSIUM SERPL-MCNC: 4.3 MMOL/L — SIGNIFICANT CHANGE UP (ref 3.5–5.3)
POTASSIUM SERPL-SCNC: 4.3 MMOL/L — SIGNIFICANT CHANGE UP (ref 3.5–5.3)
PROT SERPL-MCNC: 6 G/DL — SIGNIFICANT CHANGE UP (ref 6–8.3)
PROTHROM AB SERPL-ACNC: 11.8 SEC — SIGNIFICANT CHANGE UP (ref 10–12.9)
RBC # BLD: 4.6 M/UL — SIGNIFICANT CHANGE UP (ref 4.2–5.8)
RBC # FLD: 13.7 % — SIGNIFICANT CHANGE UP (ref 10.3–14.5)
SODIUM SERPL-SCNC: 142 MMOL/L — SIGNIFICANT CHANGE UP (ref 135–145)
WBC # BLD: 4.5 K/UL — SIGNIFICANT CHANGE UP (ref 3.8–10.5)
WBC # FLD AUTO: 4.5 K/UL — SIGNIFICANT CHANGE UP (ref 3.8–10.5)

## 2019-07-23 PROCEDURE — 71045 X-RAY EXAM CHEST 1 VIEW: CPT | Mod: 26

## 2019-07-23 PROCEDURE — 33244 REMOVE ELCTRD TRANSVENOUSLY: CPT | Mod: 59

## 2019-07-23 PROCEDURE — 33249 INSJ/RPLCMT DEFIB W/LEAD(S): CPT | Mod: 59

## 2019-07-23 PROCEDURE — 99223 1ST HOSP IP/OBS HIGH 75: CPT

## 2019-07-23 PROCEDURE — 33241 REMOVE PULSE GENERATOR: CPT

## 2019-07-23 PROCEDURE — 99291 CRITICAL CARE FIRST HOUR: CPT

## 2019-07-23 PROCEDURE — 93321 DOPPLER ECHO F-UP/LMTD STD: CPT | Mod: 26

## 2019-07-23 PROCEDURE — 93308 TTE F-UP OR LMTD: CPT | Mod: 26

## 2019-07-23 PROCEDURE — 93010 ELECTROCARDIOGRAM REPORT: CPT

## 2019-07-23 RX ORDER — ATORVASTATIN CALCIUM 80 MG/1
10 TABLET, FILM COATED ORAL AT BEDTIME
Refills: 0 | Status: DISCONTINUED | OUTPATIENT
Start: 2019-07-23 | End: 2019-07-23

## 2019-07-23 RX ORDER — CARVEDILOL PHOSPHATE 80 MG/1
25 CAPSULE, EXTENDED RELEASE ORAL EVERY 12 HOURS
Refills: 0 | Status: DISCONTINUED | OUTPATIENT
Start: 2019-07-23 | End: 2019-07-24

## 2019-07-23 RX ORDER — ONDANSETRON 8 MG/1
4 TABLET, FILM COATED ORAL EVERY 6 HOURS
Refills: 0 | Status: DISCONTINUED | OUTPATIENT
Start: 2019-07-24 | End: 2019-07-25

## 2019-07-23 RX ORDER — ASPIRIN/CALCIUM CARB/MAGNESIUM 324 MG
81 TABLET ORAL DAILY
Refills: 0 | Status: DISCONTINUED | OUTPATIENT
Start: 2019-07-23 | End: 2019-07-24

## 2019-07-23 RX ORDER — LIDOCAINE HCL 20 MG/ML
0.2 VIAL (ML) INJECTION ONCE
Refills: 0 | Status: DISCONTINUED | OUTPATIENT
Start: 2019-07-23 | End: 2019-07-23

## 2019-07-23 RX ORDER — VANCOMYCIN HCL 1 G
1000 VIAL (EA) INTRAVENOUS ONCE
Refills: 0 | Status: COMPLETED | OUTPATIENT
Start: 2019-07-23 | End: 2019-07-23

## 2019-07-23 RX ORDER — SODIUM CHLORIDE 9 MG/ML
3 INJECTION INTRAMUSCULAR; INTRAVENOUS; SUBCUTANEOUS EVERY 8 HOURS
Refills: 0 | Status: DISCONTINUED | OUTPATIENT
Start: 2019-07-23 | End: 2019-07-23

## 2019-07-23 RX ORDER — PANTOPRAZOLE SODIUM 20 MG/1
40 TABLET, DELAYED RELEASE ORAL
Refills: 0 | Status: DISCONTINUED | OUTPATIENT
Start: 2019-07-23 | End: 2019-07-24

## 2019-07-23 RX ORDER — ACETAMINOPHEN 500 MG
1000 TABLET ORAL ONCE
Refills: 0 | Status: COMPLETED | OUTPATIENT
Start: 2019-07-23 | End: 2019-07-23

## 2019-07-23 RX ORDER — CHLORHEXIDINE GLUCONATE 213 G/1000ML
1 SOLUTION TOPICAL DAILY
Refills: 0 | Status: DISCONTINUED | OUTPATIENT
Start: 2019-07-23 | End: 2019-07-23

## 2019-07-23 RX ORDER — CEFUROXIME AXETIL 250 MG
1500 TABLET ORAL ONCE
Refills: 0 | Status: DISCONTINUED | OUTPATIENT
Start: 2019-07-23 | End: 2019-07-23

## 2019-07-23 RX ORDER — CHLORHEXIDINE GLUCONATE 213 G/1000ML
1 SOLUTION TOPICAL
Refills: 0 | Status: DISCONTINUED | OUTPATIENT
Start: 2019-07-23 | End: 2019-07-24

## 2019-07-23 RX ADMIN — CARVEDILOL PHOSPHATE 25 MILLIGRAM(S): 80 CAPSULE, EXTENDED RELEASE ORAL at 17:18

## 2019-07-23 RX ADMIN — Medication 81 MILLIGRAM(S): at 17:18

## 2019-07-23 RX ADMIN — PANTOPRAZOLE SODIUM 40 MILLIGRAM(S): 20 TABLET, DELAYED RELEASE ORAL at 17:17

## 2019-07-23 RX ADMIN — Medication 1000 MILLIGRAM(S): at 21:35

## 2019-07-23 RX ADMIN — Medication 400 MILLIGRAM(S): at 20:30

## 2019-07-23 RX ADMIN — Medication 250 MILLIGRAM(S): at 21:31

## 2019-07-23 NOTE — PRE-ANESTHESIA EVALUATION ADULT - NSANTHPMHFT_GEN_ALL_CORE
H&P:  64-year-old M.  Pmhx hypertension, hyperlipidemia, pre-diabetes, and dilated cardiomyopathy. The patient also has a history of congestive heart failure. He has an underlying left bundle branch block and underwent implantation of a biventricular ICD in 2009 ,battery changed in 2015. In follow up the patient was found to have significant atrial lead noise in addition ventricular lead noise. He was therefore referred for evaluation for extraction and reimplantation.     CXR:  INTERPRETATION:  CLINICAL INFORMATION: Shortness of breath. Pacemaker   change.    TECHNIQUE: PA and lateral chest radiographs.    COMPARISON: Chest x-ray from 12/10/2012    FINDINGS:   Left chest wall AICD with right atrial, right ventricular and left   ventricular leads.   The lungs are clear. No pneumothorax.  The heart size is normal.   The visualized osseous structures are unremarkable.    IMPRESSION:     Left chest wall pacemaker. No pneumothorax.    TTE:  2018-His last echocardiogram was in April of 2018. He had mild concentric LVH with a left atrium of 4.2 cm. his ejection fraction had improved to 45-50% with Biv pacing.

## 2019-07-23 NOTE — CHART NOTE - NSCHARTNOTEFT_GEN_A_CORE
BRIEF PROCEDURE NOTE    GRACE LO  [id number]    Pre-op Diagnosis: Fractured ICD Lead, DCM/CHF/LBBB    Post-op Diagnosis: Same    Procedure: ICD/Lead extraction, Attempted BiV ICD implant    Electrophysiologist: Jillian Carbone MD    Assistant: None    Anesthesia: GA    Access: left axillary vein    Description:  6Fr sheath RFV using Seldinger technique  Amplatz wire to RIJ serve as guide for bridge balloon if needed    Local with 0.5% Marcain  ICD pokcet opened/ICD removed  Leads freed from scar in pocket, anchors removed  Active fixation retracted, leads cut  Locking stylets placed distally and #5 silk tied to outside of lead    Left venogram and access via left axillary puncture    Leads removed with 16 Fr laser sheath  ICD and A leads completely removed  LV lead disrupted/ distal electrodes remained in CS branch    New ICD lead to RVA  CS cannulated,  branch from prior lead occluded  attempt to cannulate further with wire resulted in dissection and dye in pericardial space  very small effusion on KATHERINE monitored through remainder of case, without change  hemodynamically stable  Attempt a middle cardiac vein only went to apex  therefore BiV ICD placed with LV port plugged  Atrial lead in RAA    Complications: dissected CS    EBL: 30cc    Disposition: to recovery/stable    Plan:  serial echos  Stat CXR  ancef 1gm q8hr x 2  ECG  NO HEPARIN/LOVINOX  consult CT surgery ([facility]) for epicardial LV lead
HPI: 64-year-old gentleman with a history of hypertension, hyperlipidemia, pre-diabetes, and dilated cardiomyopathy. The patient also has a history of congestive heart failure. He has an underlying left bundle branch block and underwent implantation of a biventricular ICD in  ,battery changed in . In follow up the patient was found to have significant atrial lead noise in addition ventricular lead noise. He was therefore referred for evaluation for extraction and reimplantation. Presents to PST for scheduled BIV ICD Lead Extraction and Re Implant on 19.    New ICD lead to RVA  CS cannulated,  branch from prior lead occluded  attempt to cannulate further with wire resulted in dissection and dye in pericardial space  very small effusion on KATHERINE monitored through remainder of case, without change  hemodynamically stable  Attempt a middle cardiac vein only went to apex  therefore BiV ICD placed with LV port plugged  Atrial lead in RAA    Pt returned to the CCU for hemodynamic monitoring without complaints of pain.     Objective:  Vital Signs Last 24 Hrs  T(C): 37.1 (2019 12:12), Max: 37.1 (2019 12:12)  T(F): 98.7 (2019 12:12), Max: 98.7 (2019 12:12)  HR: 76 (2019 12:12) (76 - 77)  BP: 108/68 (2019 12:12) (108/68 - 147/79)  BP(mean): 81 (2019 12:12) (81 - 81)  RR: 17 (2019 12:12) (17 - 20)  SpO2: 93% (2019 12:12) (93% - 96%)  ICU Vital Signs Last 24 Hrs  T(C): 37.1 (2019 12:12), Max: 37.1 (2019 12:12)  T(F): 98.7 (2019 12:12), Max: 98.7 (2019 12:12)  HR: 76 (2019 12:12) (76 - 77)  BP: 108/68 (2019 12:12) (108/68 - 147/79)  BP(mean): 81 (2019 12:12) (81 - 81)  RR: 17 (2019 12:12) (17 - 20)  SpO2: 93% (2019 12:12) (93% - 96%)    I&O's Summary    Daily Height in cm: 167.64 (2019 07:36)    Daily Weight in k.2 (2019 12:12)    PHYSICAL EXAM:   General: WAM in NAD  HEENT: normocephalic  Cardiovascular: S1, S2, WNL. L ant chest C/D/I without bleeding, induration, or hematoma.  Respiratory: WNL  Gastrointestinal: abd soft, ND, NT  Genitourinary: deferred  Integumentary: intact  Musculoskeletal: DENNISON w/ equal strength  Neurologic: A and O x 3   Psychologic: normal affect    TELEMETRY: NSR 78    EKG: < from: 12 Lead ECG (09.17.15 @ 17:28) >    Diagnosis Line DUAL CHAMBER PACEMAKER    ECHO:                          16.3   3.24  )-----------( 181      ( 2019 16:26 )             47.8     07-    143  |  103  |  19  ----------------------------<  206<H>  4.3   |  24  |  1.03    Ca    9.5      2019 12:43    Assessment/Plan:  ICD extraction w/ reimplantation. Unable to get LV lead in and CS dissection  - ECHO in afternoon to rule out effusion, repeat echo in AM  - Chest XR  - Vanco 1 G tonight  - Continue all other home meds  - F/U EP: May talk to Dr. Monson regarding epicardial wire access.      Kristin Carrillo CCU NP   #[id number]

## 2019-07-23 NOTE — PRE-ANESTHESIA EVALUATION ADULT - NSANTHOSAYNRD_GEN_A_CORE
criteria/No. YAYO screening performed.  STOP BANG Legend: 0-2 = LOW Risk; 3-4 = INTERMEDIATE Risk; 5-8 = HIGH Risk

## 2019-07-24 ENCOUNTER — APPOINTMENT (OUTPATIENT)
Dept: CARDIOTHORACIC SURGERY | Facility: CLINIC | Age: 64
End: 2019-07-24

## 2019-07-24 LAB
ALBUMIN SERPL ELPH-MCNC: 3.9 G/DL — SIGNIFICANT CHANGE UP (ref 3.3–5)
ALBUMIN SERPL ELPH-MCNC: 4.8 G/DL — SIGNIFICANT CHANGE UP (ref 3.3–5)
ALP SERPL-CCNC: 39 U/L — LOW (ref 40–120)
ALP SERPL-CCNC: 41 U/L — SIGNIFICANT CHANGE UP (ref 40–120)
ALT FLD-CCNC: 11 U/L — SIGNIFICANT CHANGE UP (ref 10–45)
ALT FLD-CCNC: 12 U/L — SIGNIFICANT CHANGE UP (ref 10–45)
ANION GAP SERPL CALC-SCNC: 13 MMOL/L — SIGNIFICANT CHANGE UP (ref 5–17)
ANION GAP SERPL CALC-SCNC: 15 MMOL/L — SIGNIFICANT CHANGE UP (ref 5–17)
APTT BLD: 26.1 SEC — LOW (ref 27.5–36.3)
APTT BLD: 27.4 SEC — LOW (ref 27.5–36.3)
AST SERPL-CCNC: 24 U/L — SIGNIFICANT CHANGE UP (ref 10–40)
AST SERPL-CCNC: 24 U/L — SIGNIFICANT CHANGE UP (ref 10–40)
BASOPHILS # BLD AUTO: 0 K/UL — SIGNIFICANT CHANGE UP (ref 0–0.2)
BASOPHILS # BLD AUTO: 0 K/UL — SIGNIFICANT CHANGE UP (ref 0–0.2)
BASOPHILS NFR BLD AUTO: 0 % — SIGNIFICANT CHANGE UP (ref 0–2)
BASOPHILS NFR BLD AUTO: 0.5 % — SIGNIFICANT CHANGE UP (ref 0–2)
BILIRUB SERPL-MCNC: 0.7 MG/DL — SIGNIFICANT CHANGE UP (ref 0.2–1.2)
BILIRUB SERPL-MCNC: 0.8 MG/DL — SIGNIFICANT CHANGE UP (ref 0.2–1.2)
BUN SERPL-MCNC: 10 MG/DL — SIGNIFICANT CHANGE UP (ref 7–23)
BUN SERPL-MCNC: 8 MG/DL — SIGNIFICANT CHANGE UP (ref 7–23)
CALCIUM SERPL-MCNC: 8.9 MG/DL — SIGNIFICANT CHANGE UP (ref 8.4–10.5)
CALCIUM SERPL-MCNC: 8.9 MG/DL — SIGNIFICANT CHANGE UP (ref 8.4–10.5)
CHLORIDE SERPL-SCNC: 101 MMOL/L — SIGNIFICANT CHANGE UP (ref 96–108)
CHLORIDE SERPL-SCNC: 101 MMOL/L — SIGNIFICANT CHANGE UP (ref 96–108)
CK MB BLD-MCNC: 0.6 % — SIGNIFICANT CHANGE UP (ref 0–3.5)
CK MB CFR SERPL CALC: 3.2 NG/ML — SIGNIFICANT CHANGE UP (ref 0–6.7)
CK SERPL-CCNC: 494 U/L — HIGH (ref 30–200)
CO2 SERPL-SCNC: 23 MMOL/L — SIGNIFICANT CHANGE UP (ref 22–31)
CO2 SERPL-SCNC: 23 MMOL/L — SIGNIFICANT CHANGE UP (ref 22–31)
CREAT SERPL-MCNC: 0.86 MG/DL — SIGNIFICANT CHANGE UP (ref 0.5–1.3)
CREAT SERPL-MCNC: 0.86 MG/DL — SIGNIFICANT CHANGE UP (ref 0.5–1.3)
EOSINOPHIL # BLD AUTO: 0.1 K/UL — SIGNIFICANT CHANGE UP (ref 0–0.5)
EOSINOPHIL # BLD AUTO: 0.1 K/UL — SIGNIFICANT CHANGE UP (ref 0–0.5)
EOSINOPHIL NFR BLD AUTO: 0.5 % — SIGNIFICANT CHANGE UP (ref 0–6)
EOSINOPHIL NFR BLD AUTO: 1.2 % — SIGNIFICANT CHANGE UP (ref 0–6)
FIBRINOGEN PPP-MCNC: 446 MG/DL — SIGNIFICANT CHANGE UP (ref 350–510)
GAS PNL BLDA: SIGNIFICANT CHANGE UP
GLUCOSE SERPL-MCNC: 142 MG/DL — HIGH (ref 70–99)
GLUCOSE SERPL-MCNC: 144 MG/DL — HIGH (ref 70–99)
HCT VFR BLD CALC: 45.5 % — SIGNIFICANT CHANGE UP (ref 39–50)
HCT VFR BLD CALC: 47.4 % — SIGNIFICANT CHANGE UP (ref 39–50)
HGB BLD-MCNC: 14.9 G/DL — SIGNIFICANT CHANGE UP (ref 13–17)
HGB BLD-MCNC: 15.7 G/DL — SIGNIFICANT CHANGE UP (ref 13–17)
INR BLD: 1.2 RATIO — HIGH (ref 0.88–1.16)
INR BLD: 1.24 RATIO — HIGH (ref 0.88–1.16)
LYMPHOCYTES # BLD AUTO: 0.8 K/UL — LOW (ref 1–3.3)
LYMPHOCYTES # BLD AUTO: 0.9 K/UL — LOW (ref 1–3.3)
LYMPHOCYTES # BLD AUTO: 14.2 % — SIGNIFICANT CHANGE UP (ref 13–44)
LYMPHOCYTES # BLD AUTO: 7.8 % — LOW (ref 13–44)
MAGNESIUM SERPL-MCNC: 2 MG/DL — SIGNIFICANT CHANGE UP (ref 1.6–2.6)
MAGNESIUM SERPL-MCNC: 2 MG/DL — SIGNIFICANT CHANGE UP (ref 1.6–2.6)
MCHC RBC-ENTMCNC: 29.9 PG — SIGNIFICANT CHANGE UP (ref 27–34)
MCHC RBC-ENTMCNC: 30.5 PG — SIGNIFICANT CHANGE UP (ref 27–34)
MCHC RBC-ENTMCNC: 32.7 GM/DL — SIGNIFICANT CHANGE UP (ref 32–36)
MCHC RBC-ENTMCNC: 33.2 GM/DL — SIGNIFICANT CHANGE UP (ref 32–36)
MCV RBC AUTO: 91.5 FL — SIGNIFICANT CHANGE UP (ref 80–100)
MCV RBC AUTO: 92 FL — SIGNIFICANT CHANGE UP (ref 80–100)
MONOCYTES # BLD AUTO: 0.8 K/UL — SIGNIFICANT CHANGE UP (ref 0–0.9)
MONOCYTES # BLD AUTO: 1 K/UL — HIGH (ref 0–0.9)
MONOCYTES NFR BLD AUTO: 10.1 % — SIGNIFICANT CHANGE UP (ref 2–14)
MONOCYTES NFR BLD AUTO: 13.2 % — SIGNIFICANT CHANGE UP (ref 2–14)
NEUTROPHILS # BLD AUTO: 4.6 K/UL — SIGNIFICANT CHANGE UP (ref 1.8–7.4)
NEUTROPHILS # BLD AUTO: 7.9 K/UL — HIGH (ref 1.8–7.4)
NEUTROPHILS NFR BLD AUTO: 71.4 % — SIGNIFICANT CHANGE UP (ref 43–77)
NEUTROPHILS NFR BLD AUTO: 81.1 % — HIGH (ref 43–77)
PHOSPHATE SERPL-MCNC: 3.1 MG/DL — SIGNIFICANT CHANGE UP (ref 2.5–4.5)
PHOSPHATE SERPL-MCNC: 4 MG/DL — SIGNIFICANT CHANGE UP (ref 2.5–4.5)
PLATELET # BLD AUTO: 136 K/UL — LOW (ref 150–400)
PLATELET # BLD AUTO: 142 K/UL — LOW (ref 150–400)
POTASSIUM SERPL-MCNC: 3.6 MMOL/L — SIGNIFICANT CHANGE UP (ref 3.5–5.3)
POTASSIUM SERPL-MCNC: 3.9 MMOL/L — SIGNIFICANT CHANGE UP (ref 3.5–5.3)
POTASSIUM SERPL-SCNC: 3.6 MMOL/L — SIGNIFICANT CHANGE UP (ref 3.5–5.3)
POTASSIUM SERPL-SCNC: 3.9 MMOL/L — SIGNIFICANT CHANGE UP (ref 3.5–5.3)
PROT SERPL-MCNC: 6.2 G/DL — SIGNIFICANT CHANGE UP (ref 6–8.3)
PROT SERPL-MCNC: 6.4 G/DL — SIGNIFICANT CHANGE UP (ref 6–8.3)
PROTHROM AB SERPL-ACNC: 13.8 SEC — HIGH (ref 10–12.9)
PROTHROM AB SERPL-ACNC: 14.3 SEC — HIGH (ref 10–12.9)
RBC # BLD: 4.98 M/UL — SIGNIFICANT CHANGE UP (ref 4.2–5.8)
RBC # BLD: 5.16 M/UL — SIGNIFICANT CHANGE UP (ref 4.2–5.8)
RBC # FLD: 13.8 % — SIGNIFICANT CHANGE UP (ref 10.3–14.5)
RBC # FLD: 14.1 % — SIGNIFICANT CHANGE UP (ref 10.3–14.5)
SODIUM SERPL-SCNC: 137 MMOL/L — SIGNIFICANT CHANGE UP (ref 135–145)
SODIUM SERPL-SCNC: 139 MMOL/L — SIGNIFICANT CHANGE UP (ref 135–145)
TROPONIN T, HIGH SENSITIVITY RESULT: 86 NG/L — HIGH (ref 0–51)
WBC # BLD: 6.4 K/UL — SIGNIFICANT CHANGE UP (ref 3.8–10.5)
WBC # BLD: 9.8 K/UL — SIGNIFICANT CHANGE UP (ref 3.8–10.5)
WBC # FLD AUTO: 6.4 K/UL — SIGNIFICANT CHANGE UP (ref 3.8–10.5)
WBC # FLD AUTO: 9.8 K/UL — SIGNIFICANT CHANGE UP (ref 3.8–10.5)

## 2019-07-24 PROCEDURE — 93010 ELECTROCARDIOGRAM REPORT: CPT | Mod: 77

## 2019-07-24 PROCEDURE — 99291 CRITICAL CARE FIRST HOUR: CPT

## 2019-07-24 PROCEDURE — 33249 INSJ/RPLCMT DEFIB W/LEAD(S): CPT | Mod: AS

## 2019-07-24 PROCEDURE — 71045 X-RAY EXAM CHEST 1 VIEW: CPT | Mod: 26

## 2019-07-24 PROCEDURE — 33249 INSJ/RPLCMT DEFIB W/LEAD(S): CPT

## 2019-07-24 PROCEDURE — 33202 INSERT EPICARD ELTRD OPEN: CPT

## 2019-07-24 RX ORDER — CHLORHEXIDINE GLUCONATE 213 G/1000ML
5 SOLUTION TOPICAL EVERY 4 HOURS
Refills: 0 | Status: DISCONTINUED | OUTPATIENT
Start: 2019-07-24 | End: 2019-07-24

## 2019-07-24 RX ORDER — HYDRALAZINE HCL 50 MG
10 TABLET ORAL ONCE
Refills: 0 | Status: COMPLETED | OUTPATIENT
Start: 2019-07-24 | End: 2019-07-24

## 2019-07-24 RX ORDER — CHLORHEXIDINE GLUCONATE 213 G/1000ML
15 SOLUTION TOPICAL EVERY 12 HOURS
Refills: 0 | Status: DISCONTINUED | OUTPATIENT
Start: 2019-07-24 | End: 2019-07-24

## 2019-07-24 RX ORDER — ACETAMINOPHEN 500 MG
1000 TABLET ORAL ONCE
Refills: 0 | Status: COMPLETED | OUTPATIENT
Start: 2019-07-24 | End: 2019-07-24

## 2019-07-24 RX ORDER — POTASSIUM CHLORIDE 20 MEQ
10 PACKET (EA) ORAL
Refills: 0 | Status: DISCONTINUED | OUTPATIENT
Start: 2019-07-24 | End: 2019-07-25

## 2019-07-24 RX ORDER — CARVEDILOL PHOSPHATE 80 MG/1
25 CAPSULE, EXTENDED RELEASE ORAL EVERY 12 HOURS
Refills: 0 | Status: DISCONTINUED | OUTPATIENT
Start: 2019-07-24 | End: 2019-07-25

## 2019-07-24 RX ORDER — HYDROMORPHONE HYDROCHLORIDE 2 MG/ML
0.5 INJECTION INTRAMUSCULAR; INTRAVENOUS; SUBCUTANEOUS
Refills: 0 | Status: DISCONTINUED | OUTPATIENT
Start: 2019-07-24 | End: 2019-07-24

## 2019-07-24 RX ORDER — CHLORHEXIDINE GLUCONATE 213 G/1000ML
1 SOLUTION TOPICAL
Refills: 0 | Status: DISCONTINUED | OUTPATIENT
Start: 2019-07-24 | End: 2019-07-24

## 2019-07-24 RX ORDER — ASPIRIN/CALCIUM CARB/MAGNESIUM 324 MG
81 TABLET ORAL DAILY
Refills: 0 | Status: DISCONTINUED | OUTPATIENT
Start: 2019-07-24 | End: 2019-07-25

## 2019-07-24 RX ORDER — HYDROMORPHONE HYDROCHLORIDE 2 MG/ML
1 INJECTION INTRAMUSCULAR; INTRAVENOUS; SUBCUTANEOUS EVERY 4 HOURS
Refills: 0 | Status: DISCONTINUED | OUTPATIENT
Start: 2019-07-24 | End: 2019-07-25

## 2019-07-24 RX ORDER — DOCUSATE SODIUM 100 MG
100 CAPSULE ORAL THREE TIMES A DAY
Refills: 0 | Status: DISCONTINUED | OUTPATIENT
Start: 2019-07-24 | End: 2019-07-25

## 2019-07-24 RX ORDER — LOSARTAN POTASSIUM 100 MG/1
25 TABLET, FILM COATED ORAL DAILY
Refills: 0 | Status: DISCONTINUED | OUTPATIENT
Start: 2019-07-24 | End: 2019-07-25

## 2019-07-24 RX ORDER — DEXTROSE 50 % IN WATER 50 %
50 SYRINGE (ML) INTRAVENOUS
Refills: 0 | Status: DISCONTINUED | OUTPATIENT
Start: 2019-07-24 | End: 2019-07-25

## 2019-07-24 RX ORDER — PANTOPRAZOLE SODIUM 20 MG/1
40 TABLET, DELAYED RELEASE ORAL DAILY
Refills: 0 | Status: DISCONTINUED | OUTPATIENT
Start: 2019-07-24 | End: 2019-07-25

## 2019-07-24 RX ORDER — INSULIN HUMAN 100 [IU]/ML
2 INJECTION, SOLUTION SUBCUTANEOUS
Qty: 100 | Refills: 0 | Status: DISCONTINUED | OUTPATIENT
Start: 2019-07-24 | End: 2019-07-24

## 2019-07-24 RX ORDER — LOSARTAN POTASSIUM 100 MG/1
25 TABLET, FILM COATED ORAL DAILY
Refills: 0 | Status: DISCONTINUED | OUTPATIENT
Start: 2019-07-24 | End: 2019-07-24

## 2019-07-24 RX ORDER — DEXTROSE 50 % IN WATER 50 %
25 SYRINGE (ML) INTRAVENOUS
Refills: 0 | Status: DISCONTINUED | OUTPATIENT
Start: 2019-07-24 | End: 2019-07-25

## 2019-07-24 RX ORDER — SODIUM CHLORIDE 9 MG/ML
1000 INJECTION INTRAMUSCULAR; INTRAVENOUS; SUBCUTANEOUS
Refills: 0 | Status: DISCONTINUED | OUTPATIENT
Start: 2019-07-24 | End: 2019-07-24

## 2019-07-24 RX ORDER — ATORVASTATIN CALCIUM 80 MG/1
10 TABLET, FILM COATED ORAL AT BEDTIME
Refills: 0 | Status: DISCONTINUED | OUTPATIENT
Start: 2019-07-24 | End: 2019-07-25

## 2019-07-24 RX ORDER — SODIUM CHLORIDE 9 MG/ML
1000 INJECTION, SOLUTION INTRAVENOUS
Refills: 0 | Status: DISCONTINUED | OUTPATIENT
Start: 2019-07-24 | End: 2019-07-24

## 2019-07-24 RX ADMIN — HYDROMORPHONE HYDROCHLORIDE 1 MILLIGRAM(S): 2 INJECTION INTRAMUSCULAR; INTRAVENOUS; SUBCUTANEOUS at 17:40

## 2019-07-24 RX ADMIN — Medication 1000 MILLIGRAM(S): at 20:46

## 2019-07-24 RX ADMIN — HYDROMORPHONE HYDROCHLORIDE 1 MILLIGRAM(S): 2 INJECTION INTRAMUSCULAR; INTRAVENOUS; SUBCUTANEOUS at 17:25

## 2019-07-24 RX ADMIN — Medication 81 MILLIGRAM(S): at 19:40

## 2019-07-24 RX ADMIN — HYDROMORPHONE HYDROCHLORIDE 1 MILLIGRAM(S): 2 INJECTION INTRAMUSCULAR; INTRAVENOUS; SUBCUTANEOUS at 22:30

## 2019-07-24 RX ADMIN — Medication 400 MILLIGRAM(S): at 19:48

## 2019-07-24 RX ADMIN — HYDROMORPHONE HYDROCHLORIDE 1 MILLIGRAM(S): 2 INJECTION INTRAMUSCULAR; INTRAVENOUS; SUBCUTANEOUS at 21:43

## 2019-07-24 RX ADMIN — Medication 10 MILLIGRAM(S): at 13:17

## 2019-07-24 RX ADMIN — Medication 400 MILLIGRAM(S): at 03:23

## 2019-07-24 RX ADMIN — CARVEDILOL PHOSPHATE 25 MILLIGRAM(S): 80 CAPSULE, EXTENDED RELEASE ORAL at 03:23

## 2019-07-24 RX ADMIN — Medication 1000 MILLIGRAM(S): at 04:00

## 2019-07-24 RX ADMIN — CARVEDILOL PHOSPHATE 25 MILLIGRAM(S): 80 CAPSULE, EXTENDED RELEASE ORAL at 19:40

## 2019-07-24 RX ADMIN — PANTOPRAZOLE SODIUM 40 MILLIGRAM(S): 20 TABLET, DELAYED RELEASE ORAL at 06:24

## 2019-07-24 RX ADMIN — Medication 100 MILLIGRAM(S): at 21:35

## 2019-07-24 RX ADMIN — Medication 10 MILLIGRAM(S): at 02:02

## 2019-07-24 RX ADMIN — ATORVASTATIN CALCIUM 10 MILLIGRAM(S): 80 TABLET, FILM COATED ORAL at 21:35

## 2019-07-24 NOTE — PROGRESS NOTE ADULT - ASSESSMENT
64 year old M w/ LBBB/CHF s/p fractured ICD lead extraction w/ reimplantation now awaiting apical lead placement in OR.     - Plan for OR today for apical lead placement  - Continue all home meds  - Losartan started for hypertension

## 2019-07-24 NOTE — PRE-ANESTHESIA EVALUATION ADULT - LAST ECHOCARDIOGRAM
2018-His last echocardiogram was in April of 2018. He had mild concentric LVH with a left atrium of 4.2 cm. his ejection fraction had improved to 45-50% with Biv pacing.
2018-His last echocardiogram was in April of 2018. He had mild concentric LVH with a left atrium of 4.2 cm. his ejection fraction had improved to 45-50% with Biv pacing.

## 2019-07-24 NOTE — PRE-ANESTHESIA EVALUATION ADULT - NSANTHPMHFT_GEN_ALL_CORE
64 M w/ dilated cardiomyopathy (EF 45-50%) s/p BiV ICD, HTN, HLD, DM2, initially presenting for elective extraction due to atrial and ventricular lead noise, admitted to CCU for monitoring due to inability to place LV lead due to coronary sinus dissection/ clot. Having thoracotomy for epicardial lead placement. TTE 7/23 with normal left ventricular systolic function. No segmental, wall motion abnormalities.  normal pericardium with no pericardial effusion, EF 55%

## 2019-07-24 NOTE — BRIEF OPERATIVE NOTE - NSICDXBRIEFPROCEDURE_GEN_ALL_CORE_FT
PROCEDURES:  Mini-thoracotomy for placement of epicardial electrode lead 24-Jul-2019 17:56:11 L mini anterior thoracotomy and placement of 2 epicardial leads, attachment of leads to ICD. post-procedure erector spinae anesthetic block Kayleigh Hall

## 2019-07-24 NOTE — PROGRESS NOTE ADULT - SUBJECTIVE AND OBJECTIVE BOX
Events:    Review Of Systems:  Constitutional: denies fever, chills, Fatigue   HEENT: denies Blurred vision, Eye Pain, Headache   Respiratory: denies Cough, Wheezing , Shortness of breath  Cardiovascular: denies Chest Pain, Palpitations,  LEARY   Gastrointestinal: denies Abdominal Pain, Diarrhea, Constipation   Genitourinary: denies Nocturia, Dysuria, Incontinence  Extremities: denies Swelling, Joint Pain  Neurologic: denies Focal deficit, Paresthesias, Syncope  Lymphatic: denies Swelling, Lymphadenopathy   Skin: denies Rash, Ecchymoses, Wounds   Psychiatry: denies Depression, Suicidal/Homicidal Ideation, anxiety  [X ] 10 point review of systems is otherwise negative except as mentioned above         Medications:  aspirin enteric coated 81 milliGRAM(s) Oral daily  carvedilol 25 milliGRAM(s) Oral every 12 hours  chlorhexidine 4% Liquid 1 Application(s) Topical <User Schedule>  pantoprazole    Tablet 40 milliGRAM(s) Oral before breakfast    PMH/PSH/FH/SH: [ ] Unchanged  Vitals:  T(C): 36.8 (19 @ 06:00), Max: 37.1 (19 @ 12:12)  HR: 76 (19 @ 07:00) (54 - 92)  BP: 108/68 (19 @ 12:12) (108/68 - 108/68)  BP(mean): 81 (19 @ 12:12) (81 - 81)  RR: 26 (19 @ 07:00) (12 - 26)  SpO2: 95% (19 @ 07:00) (87% - 96%)  Wt(kg): --  Daily     Daily Weight in k.1 (2019 06:00)  I&O's Summary    2019 07:01  -  2019 07:00  --------------------------------------------------------  IN: 470 mL / OUT: 2250 mL / NET: -1780 mL        Physical Exam:  Appearance: [ ] Normal [ ] NAD  Eyes: [ ] PERRL [ ] EOMI  HENT: [ ] Normal oral muscosa [ ]NC/AT  Cardiovascular: [ ] S1 [ ] S2 [ ] RRR [ ] No m/r/g [ ]No edema [ ] JVP  Procedural Access Site: [ ] No hematoma [ ] Non-tender to palpation [ ] 2+ pulse [ ] No bruit [ ] No Ecchymosis  Respiratory: [ ] Clear to auscultation bilaterally  Gastrointestinal: [ ] Soft [ ] Non-tender [ ] Non-distended [ ] BS+  Musculoskeletal: [ ] No clubbing [ ] No joint deformity   Neurologic: [ ] Non-focal  Lymphatic: [ ] No lymphadenopathy  Psychiatry: [ ] AAOx3 [ ] Mood & affect appropriate  Skin: [ ] No rashes [ ] No ecchymoses [ ] No cyanosis        137  |  101  |  10  ----------------------------<  142<H>  3.6   |  23  |  0.86    Ca    8.9      2019 06:28  Phos  4.0       Mg     2.0         TPro  6.2  /  Alb  4.8  /  TBili  0.8  /  DBili  x   /  AST  24  /  ALT  12  /  AlkPhos  39<L>      PT/INR - ( 2019 06:28 )   PT: 13.8 sec;   INR: 1.20 ratio         PTT - ( 2019 06:28 )  PTT:27.4 sec              ECG:    Echo:    Stress Testing:     Cath:    Imaging:    Interpretation of Telemetry: HPI: 64-year-old gentleman with a history of hypertension, hyperlipidemia, pre-diabetes, and dilated cardiomyopathy. The patient also has a history of congestive heart failure. He has an underlying left bundle branch block and underwent implantation of a biventricular ICD in  ,battery changed in . In follow up the patient was found to have significant atrial lead noise in addition ventricular lead noise. He was therefore referred for evaluation for extraction and reimplantation. Presents to PST for scheduled BIV ICD Lead Extraction and Re Implant on 19.    New ICD lead to RVA  CS cannulated,  branch from prior lead occluded  attempt to cannulate further with wire resulted in dissection and dye in pericardial space  very small effusion on KATHERINE monitored through remainder of case, without change  hemodynamically stable  Attempt a middle cardiac vein only went to apex  therefore BiV ICD placed with LV port plugged  Atrial lead in RAA    Events: Pt BP remains high . Pt has anaphylaxis to ACE however has taken valsartan in the past. Losartan ordered.     Review Of Systems:  Constitutional: denies fever, chills, Fatigue   HEENT: denies Blurred vision, Eye Pain, Headache   Respiratory: denies Cough, Wheezing , Shortness of breath  Cardiovascular: denies Chest Pain, Palpitations,  LEARY   Gastrointestinal: denies Abdominal Pain, Diarrhea, Constipation   Genitourinary: denies Nocturia, Dysuria, Incontinence  Extremities: denies Swelling, Joint Pain  Neurologic: denies Focal deficit, Paresthesias, Syncope  Lymphatic: denies Swelling, Lymphadenopathy   Skin: denies Rash, Ecchymoses, Wounds   Psychiatry: denies Depression, Suicidal/Homicidal Ideation, anxiety  [X ] 10 point review of systems is otherwise negative except as mentioned above         Medications:  aspirin enteric coated 81 milliGRAM(s) Oral daily  carvedilol 25 milliGRAM(s) Oral every 12 hours  chlorhexidine 4% Liquid 1 Application(s) Topical <User Schedule>  pantoprazole    Tablet 40 milliGRAM(s) Oral before breakfast    Vitals:  T(C): 36.8 (19 @ 06:00), Max: 37.1 (19 @ 12:12)  HR: 76 (19 @ 07:00) (54 - 92)  BP: 108/68 (19 @ 12:12) (108/68 - 108/68)  BP(mean): 81 (19 @ 12:12) (81 - 81)  RR: 26 (19 @ 07:00) (12 - 26)  SpO2: 95% (19 @ 07:00) (87% - 96%)    Daily     Daily Weight in k.1 (2019 06:00)  I&O's Summary    2019 07:01  -  2019 07:00  --------------------------------------------------------  IN: 470 mL / OUT: 2250 mL / NET: -1780 mL    Physical Exam:  General: WA in NAD  HEENT: normocephalic  Cardiovascular: S1, S2, WNL. L ant chest C/D/I without bleeding, induration, or hematoma.  Respiratory: WNL  Gastrointestinal: abd soft, ND, NT  Genitourinary: deferred  Integumentary: intact  Musculoskeletal: DENNISON w/ equal strength  Neurologic: A and O x 3   Psychologic: normal affect        137  |  101  |  10  ----------------------------<  142<H>  3.6   |  23  |  0.86    Ca    8.9      2019 06:28  Phos  4.0       Mg     2.0         TPro  6.2  /  Alb  4.8  /  TBili  0.8  /  DBili  x   /  AST  24  /  ALT  12  /  AlkPhos  39<L>  -24    PT/INR - ( 2019 06:28 )   PT: 13.8 sec;   INR: 1.20 ratio         PTT - ( 2019 06:28 )  PTT:27.4 sec    PHYSICAL EXAM:   General: WAM in NAD  HEENT: normocephalic  Cardiovascular: S1, S2, WNL. L ant chest C/D/I without bleeding, induration, or hematoma.  Respiratory: WNL  Gastrointestinal: abd soft, ND, NT  Genitourinary: deferred  Integumentary: intact  Musculoskeletal: DENNISON w/ equal strength  Neurologic: A and O x 3   Psychologic: normal affect    TELEMETRY: NSR 78    EKG: < from: 12 Lead ECG (09.17.15 @ 17:28) >    Diagnosis Line DUAL [employer] PACEMAKER    ECHO:                          16.3   3.24  )-----------( 181      ( 2019 16:26 )             47.8     07-    143  |  103  |  19  ----------------------------<  206<H>  4.3   |  24  |  1.03    Ca    9.5      2019 12:43

## 2019-07-24 NOTE — PROGRESS NOTE ADULT - SUBJECTIVE AND OBJECTIVE BOX
====================  CCU MIDNIGHT ROUNDS  ====================    GRACE LO  [id number]  Patient is a 64y old  Male who presents with a chief complaint of lead extraction    ====================  SUMMARY:  ====================        ====================  NEW EVENTS:  ====================    New ICD lead to RVA  CS cannulated,  branch from prior lead occluded  attempt to cannulate further with wire resulted in dissection and dye in pericardial space  very small effusion on KATHERINE monitored through remainder of case, without change  hemodynamically stable  Attempt a middle cardiac vein only went to apex  therefore BiV ICD placed with LV port plugged  Atrial lead in RAA    Pt returned to the CCU for hemodynamic monitoring without complaints of pain.       ====================  VITALS (Last 12 hrs):  ====================    T(C): 36.6 (07-23-19 @ 20:00), Max: 37.1 (07-23-19 @ 12:12)  T(F): 97.8 (07-23-19 @ 20:00), Max: 98.7 (07-23-19 @ 12:12)  HR: 60 (07-23-19 @ 23:00) (54 - 78)  BP: 108/68 (07-23-19 @ 12:12) (108/68 - 108/68)  BP(mean): 81 (07-23-19 @ 12:12) (81 - 81)  ABP: 148/68 (07-23-19 @ 23:00) (108/74 - 156/70)  ABP(mean): 90 (07-23-19 @ 23:00) (74 - 98)  RR: 18 (07-23-19 @ 23:00) (12 - 22)  SpO2: 94% (07-23-19 @ 23:00) (87% - 96%)  Wt(kg): --  CVP(mm Hg): --  CVP(cm H2O): --  CO: --  CI: --  PA: --  PA(mean): --  PCWP: --  SVR: --  PVR: --    I&O's Summary    23 Jul 2019 07:01  -  24 Jul 2019 00:04  --------------------------------------------------------  IN: 470 mL / OUT: 1400 mL / NET: -930 mL            ====================  NEW LABS:  ====================                          14.6   4.5   )-----------( 135      ( 23 Jul 2019 13:35 )             41.9     07-23    142  |  108  |  16  ----------------------------<  149<H>  4.3   |  22  |  0.94    Ca    8.7      23 Jul 2019 13:35  Phos  4.3     07-23  Mg     1.9     07-23    TPro  6.0  /  Alb  3.6  /  TBili  0.8  /  DBili  x   /  AST  27  /  ALT  15  /  AlkPhos  37<L>  07-23    PT/INR - ( 23 Jul 2019 13:35 )   PT: 11.8 sec;   INR: 1.03 ratio         PTT - ( 23 Jul 2019 13:35 )  PTT:24.5 sec      ====================  PLAN:  Assessment/Plan:  ICD extraction w/ reimplantation. Unable to get LV lead in and CS dissection  - ECHO in afternoon to rule out effusion, repeat echo in AM  - Chest XR  - Vanco 1 G tonight  - Continue all other home meds  - F/U EP: May talk to Dr. Monson regarding epicardial wire access.    ====================  -     Sang Connolly, Internal Medicine R1  [phone] ====================  CCU MIDNIGHT ROUNDS  ====================    GRACE LO  [id number]  Patient is a 64y old  Male who presents with a chief complaint of lead extraction    ====================  SUMMARY:  ====================    65 yo M with LBBB/CHF presents for ICD extraction for fractured lead s/p reimplantation complicated by coronary sinus dissection, pericardial effusion      ====================  NEW EVENTS:  ====================  s/p extraction and new ICD placement. Leads were placed to RA/RV. However complicated as LV unable to be placed due to cannulation of CS. Small   peric  hemodynamically stable  Attempt a middle cardiac vein only went to apex  therefore BiV ICD placed with LV port plugged  Atrial lead in RAA    Pt returned to the CCU for hemodynamic monitoring without complaints of pain.       ====================  VITALS (Last 12 hrs):  ====================    T(C): 36.6 (07-23-19 @ 20:00), Max: 37.1 (07-23-19 @ 12:12)  T(F): 97.8 (07-23-19 @ 20:00), Max: 98.7 (07-23-19 @ 12:12)  HR: 60 (07-23-19 @ 23:00) (54 - 78)  BP: 108/68 (07-23-19 @ 12:12) (108/68 - 108/68)  BP(mean): 81 (07-23-19 @ 12:12) (81 - 81)  ABP: 148/68 (07-23-19 @ 23:00) (108/74 - 156/70)  ABP(mean): 90 (07-23-19 @ 23:00) (74 - 98)  RR: 18 (07-23-19 @ 23:00) (12 - 22)  SpO2: 94% (07-23-19 @ 23:00) (87% - 96%)  Wt(kg): --  CVP(mm Hg): --  CVP(cm H2O): --  CO: --  CI: --  PA: --  PA(mean): --  PCWP: --  SVR: --  PVR: --    I&O's Summary    23 Jul 2019 07:01  -  24 Jul 2019 00:04  --------------------------------------------------------  IN: 470 mL / OUT: 1400 mL / NET: -930 mL            ====================  NEW LABS:  ====================                          14.6   4.5   )-----------( 135      ( 23 Jul 2019 13:35 )             41.9     07-23    142  |  108  |  16  ----------------------------<  149<H>  4.3   |  22  |  0.94    Ca    8.7      23 Jul 2019 13:35  Phos  4.3     07-23  Mg     1.9     07-23    TPro  6.0  /  Alb  3.6  /  TBili  0.8  /  DBili  x   /  AST  27  /  ALT  15  /  AlkPhos  37<L>  07-23    PT/INR - ( 23 Jul 2019 13:35 )   PT: 11.8 sec;   INR: 1.03 ratio         PTT - ( 23 Jul 2019 13:35 )  PTT:24.5 sec      ====================  PLAN:  Assessment/Plan:  ICD extraction w/ reimplantation. Unable to get LV lead in and CS dissection  - ECHO in afternoon to rule out effusion, repeat echo in AM  - Chest XR  - Vanco 1 G tonight  - Continue all other home meds  - F/U EP: May talk to Dr. Monson regarding epicardial wire access.    ====================  -     Sang Connolly, Internal Medicine   [phone] ====================  CCU MIDNIGHT ROUNDS  ====================    GRACE LO  [id number]  Patient is a 64y old  Male who presents with a chief complaint of lead extraction    ====================  SUMMARY:  ====================    63 yo M with LBBB/CHF presents for ICD extraction for fractured lead s/p reimplantation complicated by coronary sinus dissection, pericardial effusion      ====================  NEW EVENTS:  ====================  s/p extraction and new ICD placement. Leads were placed to RA/RV. However complicated as LV unable to be placed due to cannulation of CS. Small   pericardial effusion, without tamponade physiology. CCU admission for hemodynamic monitoring.     ====================  VITALS (Last 12 hrs):  ====================    T(C): 36.6 (07-23-19 @ 20:00), Max: 37.1 (07-23-19 @ 12:12)  T(F): 97.8 (07-23-19 @ 20:00), Max: 98.7 (07-23-19 @ 12:12)  HR: 60 (07-23-19 @ 23:00) (54 - 78)  BP: 108/68 (07-23-19 @ 12:12) (108/68 - 108/68)  BP(mean): 81 (07-23-19 @ 12:12) (81 - 81)  ABP: 148/68 (07-23-19 @ 23:00) (108/74 - 156/70)  ABP(mean): 90 (07-23-19 @ 23:00) (74 - 98)  RR: 18 (07-23-19 @ 23:00) (12 - 22)  SpO2: 94% (07-23-19 @ 23:00) (87% - 96%)    I&O's Summary    23 Jul 2019 07:01  -  24 Jul 2019 00:04  --------------------------------------------------------  IN: 470 mL / OUT: 1400 mL / NET: -930 mL    Tele: Sinus sobeida/NSR     ====================  NEW LABS:  ====================                        14.6   4.5   )-----------( 135      ( 23 Jul 2019 13:35 )             41.9     07-23    142  |  108  |  16  ----------------------------<  149<H>  4.3   |  22  |  0.94    Ca    8.7      23 Jul 2019 13:35  Phos  4.3     07-23  Mg     1.9     07-23    TPro  6.0  /  Alb  3.6  /  TBili  0.8  /  DBili  x   /  AST  27  /  ALT  15  /  AlkPhos  37<L>  07-23    PT/INR - ( 23 Jul 2019 13:35 )   PT: 11.8 sec;   INR: 1.03 ratio    PTT - ( 23 Jul 2019 13:35 )  PTT:24.5 sec    ====================  PLAN:  Assessment/Plan:  ICD extraction w/ reimplantation. Unable to get LV lead 2/2 CS dissection, also with pericardial effusion   - ECHO in afternoon with stable pericardial effusion, repeat echo in AM  - sp Vanco 1 G   - CT surg Dr. Monson to perform epicardial LV lead implantation     ====================  -     Sang Connolly, Internal Medicine R2  [phone]

## 2019-07-25 ENCOUNTER — TRANSCRIPTION ENCOUNTER (OUTPATIENT)
Age: 64
End: 2019-07-25

## 2019-07-25 VITALS
RESPIRATION RATE: 28 BRPM | HEART RATE: 94 BPM | DIASTOLIC BLOOD PRESSURE: 66 MMHG | SYSTOLIC BLOOD PRESSURE: 115 MMHG | OXYGEN SATURATION: 91 %

## 2019-07-25 LAB
ALBUMIN SERPL ELPH-MCNC: 3.8 G/DL — SIGNIFICANT CHANGE UP (ref 3.3–5)
ALP SERPL-CCNC: 39 U/L — LOW (ref 40–120)
ALT FLD-CCNC: 12 U/L — SIGNIFICANT CHANGE UP (ref 10–45)
ANION GAP SERPL CALC-SCNC: 11 MMOL/L — SIGNIFICANT CHANGE UP (ref 5–17)
AST SERPL-CCNC: 27 U/L — SIGNIFICANT CHANGE UP (ref 10–40)
BASOPHILS # BLD AUTO: 0 K/UL — SIGNIFICANT CHANGE UP (ref 0–0.2)
BASOPHILS NFR BLD AUTO: 0.3 % — SIGNIFICANT CHANGE UP (ref 0–2)
BILIRUB SERPL-MCNC: 0.9 MG/DL — SIGNIFICANT CHANGE UP (ref 0.2–1.2)
BUN SERPL-MCNC: 14 MG/DL — SIGNIFICANT CHANGE UP (ref 7–23)
CALCIUM SERPL-MCNC: 8.5 MG/DL — SIGNIFICANT CHANGE UP (ref 8.4–10.5)
CHLORIDE SERPL-SCNC: 100 MMOL/L — SIGNIFICANT CHANGE UP (ref 96–108)
CO2 SERPL-SCNC: 25 MMOL/L — SIGNIFICANT CHANGE UP (ref 22–31)
CREAT SERPL-MCNC: 0.96 MG/DL — SIGNIFICANT CHANGE UP (ref 0.5–1.3)
EOSINOPHIL # BLD AUTO: 0 K/UL — SIGNIFICANT CHANGE UP (ref 0–0.5)
EOSINOPHIL NFR BLD AUTO: 0.1 % — SIGNIFICANT CHANGE UP (ref 0–6)
GLUCOSE SERPL-MCNC: 185 MG/DL — HIGH (ref 70–99)
HCT VFR BLD CALC: 44.8 % — SIGNIFICANT CHANGE UP (ref 39–50)
HGB BLD-MCNC: 15.1 G/DL — SIGNIFICANT CHANGE UP (ref 13–17)
LYMPHOCYTES # BLD AUTO: 0.7 K/UL — LOW (ref 1–3.3)
LYMPHOCYTES # BLD AUTO: 7.3 % — LOW (ref 13–44)
MAGNESIUM SERPL-MCNC: 2 MG/DL — SIGNIFICANT CHANGE UP (ref 1.6–2.6)
MCHC RBC-ENTMCNC: 31 PG — SIGNIFICANT CHANGE UP (ref 27–34)
MCHC RBC-ENTMCNC: 33.6 GM/DL — SIGNIFICANT CHANGE UP (ref 32–36)
MCV RBC AUTO: 92.1 FL — SIGNIFICANT CHANGE UP (ref 80–100)
MONOCYTES # BLD AUTO: 1.3 K/UL — HIGH (ref 0–0.9)
MONOCYTES NFR BLD AUTO: 14.4 % — HIGH (ref 2–14)
NEUTROPHILS # BLD AUTO: 7.2 K/UL — SIGNIFICANT CHANGE UP (ref 1.8–7.4)
NEUTROPHILS NFR BLD AUTO: 77.8 % — HIGH (ref 43–77)
PHOSPHATE SERPL-MCNC: 3 MG/DL — SIGNIFICANT CHANGE UP (ref 2.5–4.5)
PLATELET # BLD AUTO: 129 K/UL — LOW (ref 150–400)
POTASSIUM SERPL-MCNC: 4.1 MMOL/L — SIGNIFICANT CHANGE UP (ref 3.5–5.3)
POTASSIUM SERPL-SCNC: 4.1 MMOL/L — SIGNIFICANT CHANGE UP (ref 3.5–5.3)
PROT SERPL-MCNC: 6.4 G/DL — SIGNIFICANT CHANGE UP (ref 6–8.3)
RBC # BLD: 4.86 M/UL — SIGNIFICANT CHANGE UP (ref 4.2–5.8)
RBC # FLD: 14 % — SIGNIFICANT CHANGE UP (ref 10.3–14.5)
SODIUM SERPL-SCNC: 136 MMOL/L — SIGNIFICANT CHANGE UP (ref 135–145)
WBC # BLD: 9.2 K/UL — SIGNIFICANT CHANGE UP (ref 3.8–10.5)
WBC # FLD AUTO: 9.2 K/UL — SIGNIFICANT CHANGE UP (ref 3.8–10.5)

## 2019-07-25 PROCEDURE — 99239 HOSP IP/OBS DSCHRG MGMT >30: CPT

## 2019-07-25 PROCEDURE — 93321 DOPPLER ECHO F-UP/LMTD STD: CPT | Mod: 26

## 2019-07-25 PROCEDURE — 71045 X-RAY EXAM CHEST 1 VIEW: CPT | Mod: 26

## 2019-07-25 PROCEDURE — 93308 TTE F-UP OR LMTD: CPT | Mod: 26

## 2019-07-25 PROCEDURE — 93010 ELECTROCARDIOGRAM REPORT: CPT | Mod: 76

## 2019-07-25 RX ORDER — CARVEDILOL PHOSPHATE 80 MG/1
1 CAPSULE, EXTENDED RELEASE ORAL
Qty: 0 | Refills: 0 | DISCHARGE
Start: 2019-07-25

## 2019-07-25 RX ORDER — ASPIRIN/CALCIUM CARB/MAGNESIUM 324 MG
1 TABLET ORAL
Qty: 0 | Refills: 0 | DISCHARGE
Start: 2019-07-25

## 2019-07-25 RX ORDER — OXYCODONE HYDROCHLORIDE 5 MG/1
5 TABLET ORAL ONCE
Refills: 0 | Status: DISCONTINUED | OUTPATIENT
Start: 2019-07-25 | End: 2019-07-25

## 2019-07-25 RX ORDER — SIMETHICONE 80 MG/1
80 TABLET, CHEWABLE ORAL ONCE
Refills: 0 | Status: COMPLETED | OUTPATIENT
Start: 2019-07-25 | End: 2019-07-25

## 2019-07-25 RX ORDER — ACETAMINOPHEN 500 MG
1000 TABLET ORAL ONCE
Refills: 0 | Status: COMPLETED | OUTPATIENT
Start: 2019-07-25 | End: 2019-07-25

## 2019-07-25 RX ADMIN — Medication 100 MILLIGRAM(S): at 06:34

## 2019-07-25 RX ADMIN — CARVEDILOL PHOSPHATE 25 MILLIGRAM(S): 80 CAPSULE, EXTENDED RELEASE ORAL at 18:19

## 2019-07-25 RX ADMIN — OXYCODONE HYDROCHLORIDE 5 MILLIGRAM(S): 5 TABLET ORAL at 15:15

## 2019-07-25 RX ADMIN — PANTOPRAZOLE SODIUM 40 MILLIGRAM(S): 20 TABLET, DELAYED RELEASE ORAL at 11:18

## 2019-07-25 RX ADMIN — SIMETHICONE 80 MILLIGRAM(S): 80 TABLET, CHEWABLE ORAL at 02:50

## 2019-07-25 RX ADMIN — HYDROMORPHONE HYDROCHLORIDE 1 MILLIGRAM(S): 2 INJECTION INTRAMUSCULAR; INTRAVENOUS; SUBCUTANEOUS at 15:20

## 2019-07-25 RX ADMIN — HYDROMORPHONE HYDROCHLORIDE 1 MILLIGRAM(S): 2 INJECTION INTRAMUSCULAR; INTRAVENOUS; SUBCUTANEOUS at 04:00

## 2019-07-25 RX ADMIN — CARVEDILOL PHOSPHATE 25 MILLIGRAM(S): 80 CAPSULE, EXTENDED RELEASE ORAL at 06:34

## 2019-07-25 RX ADMIN — Medication 400 MILLIGRAM(S): at 08:27

## 2019-07-25 RX ADMIN — HYDROMORPHONE HYDROCHLORIDE 1 MILLIGRAM(S): 2 INJECTION INTRAMUSCULAR; INTRAVENOUS; SUBCUTANEOUS at 03:09

## 2019-07-25 RX ADMIN — HYDROMORPHONE HYDROCHLORIDE 1 MILLIGRAM(S): 2 INJECTION INTRAMUSCULAR; INTRAVENOUS; SUBCUTANEOUS at 16:00

## 2019-07-25 RX ADMIN — Medication 1000 MILLIGRAM(S): at 08:45

## 2019-07-25 RX ADMIN — SIMETHICONE 80 MILLIGRAM(S): 80 TABLET, CHEWABLE ORAL at 06:39

## 2019-07-25 RX ADMIN — LOSARTAN POTASSIUM 25 MILLIGRAM(S): 100 TABLET, FILM COATED ORAL at 06:37

## 2019-07-25 RX ADMIN — Medication 81 MILLIGRAM(S): at 11:18

## 2019-07-25 NOTE — DISCHARGE NOTE NURSING/CASE MANAGEMENT/SOCIAL WORK - NSDCFUADDAPPT_GEN_ALL_CORE_FT
Follow up with Dr. Carbone. Call to schedule an appointment within 2-3 weeks  Follow up with Dr. Monson the CT surgeon within 1-2 weeks.

## 2019-07-25 NOTE — PROGRESS NOTE ADULT - SUBJECTIVE AND OBJECTIVE BOX
Events:    Review Of Systems:  Constitutional: denies fever, chills, Fatigue   HEENT: denies Blurred vision, Eye Pain, Headache   Respiratory: denies Cough, Wheezing , Shortness of breath  Cardiovascular: denies Chest Pain, Palpitations,  LEARY   Gastrointestinal: denies Abdominal Pain, Diarrhea, Constipation   Genitourinary: denies Nocturia, Dysuria, Incontinence  Extremities: denies Swelling, Joint Pain  Neurologic: denies Focal deficit, Paresthesias, Syncope  Lymphatic: denies Swelling, Lymphadenopathy   Skin: denies Rash, Ecchymoses, Wounds   Psychiatry: denies Depression, Suicidal/Homicidal Ideation, anxiety  [X ] 10 point review of systems is otherwise negative except as mentioned above         Medications:  acetaminophen  IVPB .. 1000 milliGRAM(s) IV Intermittent once  aspirin enteric coated 81 milliGRAM(s) Oral daily  atorvastatin 10 milliGRAM(s) Oral at bedtime  carvedilol 25 milliGRAM(s) Oral every 12 hours  dextrose 50% Injectable 50 milliLiter(s) IV Push every 15 minutes  dextrose 50% Injectable 25 milliLiter(s) IV Push every 15 minutes  docusate sodium 100 milliGRAM(s) Oral three times a day  HYDROmorphone  Injectable 1 milliGRAM(s) IV Push every 4 hours PRN  losartan 25 milliGRAM(s) Oral daily  ondansetron Injectable 4 milliGRAM(s) IV Push every 6 hours PRN  pantoprazole  Injectable 40 milliGRAM(s) IV Push daily  potassium chloride  10 mEq/50 mL IVPB 10 milliEquivalent(s) IV Intermittent every 1 hour  potassium chloride  10 mEq/50 mL IVPB 10 milliEquivalent(s) IV Intermittent every 1 hour  potassium chloride  10 mEq/50 mL IVPB 10 milliEquivalent(s) IV Intermittent every 1 hour    PMH/PSH/FH/SH: [ ] Unchanged  Vitals:  T(C): 36.8 (19 @ 06:00), Max: 36.8 (19 @ 10:00)  HR: 94 (19 @ 06:00) (72 - 104)  BP: --  BP(mean): --  RR: 24 (19 @ 06:00) (11 - 28)  SpO2: 92% (19 @ 06:00) (91% - 97%)  Wt(kg): --  Daily Height in cm: 167.64 (2019 14:01)    Daily Weight in k.6 (2019 06:00)  I&O's Summary    2019 07:01  -  2019 07:00  --------------------------------------------------------  IN: 580 mL / OUT: 1300 mL / NET: -720 mL        Physical Exam:  Appearance: [ ] Normal [ ] NAD  Eyes: [ ] PERRL [ ] EOMI  HENT: [ ] Normal oral muscosa [ ]NC/AT  Cardiovascular: [ ] S1 [ ] S2 [ ] RRR [ ] No m/r/g [ ]No edema [ ] JVP  Procedural Access Site: [ ] No hematoma [ ] Non-tender to palpation [ ] 2+ pulse [ ] No bruit [ ] No Ecchymosis  Respiratory: [ ] Clear to auscultation bilaterally  Gastrointestinal: [ ] Soft [ ] Non-tender [ ] Non-distended [ ] BS+  Musculoskeletal: [ ] No clubbing [ ] No joint deformity   Neurologic: [ ] Non-focal  Lymphatic: [ ] No lymphadenopathy  Psychiatry: [ ] AAOx3 [ ] Mood & affect appropriate  Skin: [ ] No rashes [ ] No ecchymoses [ ] No cyanosis        136  |  100  |  14  ----------------------------<  185<H>  4.1   |  25  |  0.96    Ca    8.5      2019 02:39  Phos  3.0       Mg     2.0         TPro  6.4  /  Alb  3.8  /  TBili  0.9  /  DBili  x   /  AST  27  /  ALT  12  /  AlkPhos  39<L>      PT/INR - ( 2019 18:26 )   PT: 14.3 sec;   INR: 1.24 ratio         PTT - ( 2019 18:26 )  PTT:26.1 sec  CARDIAC MARKERS ( 2019 02:39 )  x     / x     / 670 U/L / x     / 2.6 ng/mL  CARDIAC MARKERS ( 2019 18:26 )  x     / x     / 494 U/L / x     / 3.2 ng/mL              ECG:    Echo:    Stress Testing:     Cath:    Imaging:    Interpretation of Telemetry: cardiomyopathy. The patient also has a history of congestive heart failure. He has an underlying left bundle branch block and underwent implantation of a biventricular ICD in  ,battery changed in 2015. In follow up the patient was found to have significant atrial lead noise in addition ventricular lead noise. He was therefore referred for evaluation for extraction and reimplantation. Presents to PST for scheduled BIV ICD Lead Extraction and Re Implant on 19.    New ICD lead to RVA  CS cannulated,  branch from prior lead occluded  attempt to cannulate further with wire resulted in dissection and dye in pericardial space  very small effusion on KATHERINE monitored through remainder of case, without change  hemodynamically stable  Attempt a middle cardiac vein only went to apex  therefore BiV ICD placed with LV port plugged  Atrial lead in RAA      Events:    Review Of Systems:  Constitutional: denies fever, chills, Fatigue   HEENT: denies Blurred vision, Eye Pain, Headache   Respiratory: denies Cough, Wheezing , Shortness of breath  Cardiovascular: denies Chest Pain, Palpitations,  LEARY   Gastrointestinal: denies Abdominal Pain, Diarrhea, Constipation   Genitourinary: denies Nocturia, Dysuria, Incontinence  Extremities: denies Swelling, Joint Pain  Neurologic: denies Focal deficit, Paresthesias, Syncope  Lymphatic: denies Swelling, Lymphadenopathy   Skin: denies Rash, Ecchymoses, Wounds   Psychiatry: denies Depression, Suicidal/Homicidal Ideation, anxiety  [X ] 10 point review of systems is otherwise negative except as mentioned above         Medications:  acetaminophen  IVPB .. 1000 milliGRAM(s) IV Intermittent once  aspirin enteric coated 81 milliGRAM(s) Oral daily  atorvastatin 10 milliGRAM(s) Oral at bedtime  carvedilol 25 milliGRAM(s) Oral every 12 hours  dextrose 50% Injectable 50 milliLiter(s) IV Push every 15 minutes  dextrose 50% Injectable 25 milliLiter(s) IV Push every 15 minutes  docusate sodium 100 milliGRAM(s) Oral three times a day  HYDROmorphone  Injectable 1 milliGRAM(s) IV Push every 4 hours PRN  losartan 25 milliGRAM(s) Oral daily  ondansetron Injectable 4 milliGRAM(s) IV Push every 6 hours PRN  pantoprazole  Injectable 40 milliGRAM(s) IV Push daily  potassium chloride  10 mEq/50 mL IVPB 10 milliEquivalent(s) IV Intermittent every 1 hour  potassium chloride  10 mEq/50 mL IVPB 10 milliEquivalent(s) IV Intermittent every 1 hour  potassium chloride  10 mEq/50 mL IVPB 10 milliEquivalent(s) IV Intermittent every 1 hour    PMH/PSH/FH/SH: [ ] Unchanged  Vitals:  T(C): 36.8 (19 @ 06:00), Max: 36.8 (19 @ 10:00)  HR: 94 (19 @ 06:00) (72 - 104)  BP: --  BP(mean): --  RR: 24 (19 @ 06:00) (11 - 28)  SpO2: 92% (19 @ 06:00) (91% - 97%)  Wt(kg): --  Daily Height in cm: 167.64 (2019 14:01)    Daily Weight in k.6 (2019 06:00)  I&O's Summary    2019 07:01  -  2019 07:00  --------------------------------------------------------  IN: 580 mL / OUT: 1300 mL / NET: -720 mL        Physical Exam:  Appearance: [ ] Normal [ ] NAD  Eyes: [ ] PERRL [ ] EOMI  HENT: [ ] Normal oral muscosa [ ]NC/AT  Cardiovascular: [ ] S1 [ ] S2 [ ] RRR [ ] No m/r/g [ ]No edema [ ] JVP  Procedural Access Site: [ ] No hematoma [ ] Non-tender to palpation [ ] 2+ pulse [ ] No bruit [ ] No Ecchymosis  Respiratory: [ ] Clear to auscultation bilaterally  Gastrointestinal: [ ] Soft [ ] Non-tender [ ] Non-distended [ ] BS+  Musculoskeletal: [ ] No clubbing [ ] No joint deformity   Neurologic: [ ] Non-focal  Lymphatic: [ ] No lymphadenopathy  Psychiatry: [ ] AAOx3 [ ] Mood & affect appropriate  Skin: [ ] No rashes [ ] No ecchymoses [ ] No cyanosis        136  |  100  |  14  ----------------------------<  185<H>  4.1   |  25  |  0.96    Ca    8.5      2019 02:39  Phos  3.0       Mg     2.0         TPro  6.4  /  Alb  3.8  /  TBili  0.9  /  DBili  x   /  AST  27  /  ALT  12  /  AlkPhos  39<L>  -25    PT/INR - ( 2019 18:26 )   PT: 14.3 sec;   INR: 1.24 ratio         PTT - ( 2019 18:26 )  PTT:26.1 sec  CARDIAC MARKERS ( 2019 02:39 )  x     / x     / 670 U/L / x     / 2.6 ng/mL  CARDIAC MARKERS ( 2019 18:26 )  x     / x     / 494 U/L / x     / 3.2 ng/mL              ECG:    Echo:    Stress Testing:     Cath:    Imaging:    Interpretation of Telemetry: cardiomyopathy. The patient also has a history of congestive heart failure. He has an underlying left bundle branch block and underwent implantation of a biventricular ICD in  ,battery changed in . In follow up the patient was found to have significant atrial lead noise in addition ventricular lead noise. He was therefore referred for evaluation for extraction and reimplantation. Presents to PST for scheduled BIV ICD Lead Extraction and Re Implant on 19.    New ICD lead to RVA  CS cannulated,  branch from prior lead occluded  attempt to cannulate further with wire resulted in dissection and dye in pericardial space  very small effusion on KATHERINE monitored through remainder of case, without change  hemodynamically stable  Attempt a middle cardiac vein only went to apex  therefore BiV ICD placed with LV port plugged  Atrial lead in RAA    Events: Pt complaining of pain with deep breaths. IV tylenol and echo ordered. Pt OOB. BP now within range will discontinued losartan.    Review Of Systems:  Constitutional: denies fever, chills, Fatigue   HEENT: denies Blurred vision, Eye Pain, Headache   Respiratory: denies Cough, Wheezing , Shortness of breath  Cardiovascular: complaining of chest pain on deep inspiration  Gastrointestinal: denies Abdominal Pain, Diarrhea, Constipation   Genitourinary: denies Nocturia, Dysuria, Incontinence  Extremities: denies Swelling, Joint Pain  Neurologic: denies Focal deficit, Paresthesias, Syncope  Lymphatic: denies Swelling, Lymphadenopathy   Skin: denies Rash, Ecchymoses, Wounds   Psychiatry: denies Depression, Suicidal/Homicidal Ideation, anxiety  [X ] 10 point review of systems is otherwise negative except as mentioned above         Medications:  acetaminophen  IVPB .. 1000 milliGRAM(s) IV Intermittent once  aspirin enteric coated 81 milliGRAM(s) Oral daily  atorvastatin 10 milliGRAM(s) Oral at bedtime  carvedilol 25 milliGRAM(s) Oral every 12 hours  dextrose 50% Injectable 50 milliLiter(s) IV Push every 15 minutes  dextrose 50% Injectable 25 milliLiter(s) IV Push every 15 minutes  docusate sodium 100 milliGRAM(s) Oral three times a day  HYDROmorphone  Injectable 1 milliGRAM(s) IV Push every 4 hours PRN  losartan 25 milliGRAM(s) Oral daily  ondansetron Injectable 4 milliGRAM(s) IV Push every 6 hours PRN  pantoprazole  Injectable 40 milliGRAM(s) IV Push daily  potassium chloride  10 mEq/50 mL IVPB 10 milliEquivalent(s) IV Intermittent every 1 hour  potassium chloride  10 mEq/50 mL IVPB 10 milliEquivalent(s) IV Intermittent every 1 hour  potassium chloride  10 mEq/50 mL IVPB 10 milliEquivalent(s) IV Intermittent every 1 hour    Vitals:  T(C): 36.8 (19 @ 06:00), Max: 36.8 (19 @ 10:00)  HR: 94 (19 @ 06:00) (72 - 104)  RR: 24 (19 @ 06:00) (11 - 28)  SpO2: 92% (19 @ 06:00) (91% - 97%)    Daily Height in cm: 167.64 (2019 14:01)    Daily Weight in k.6 (2019 06:00)  I&O's Summary    2019 07:01  -  2019 07:00  --------------------------------------------------------  IN: 580 mL / OUT: 1300 mL / NET: -720 mL    Physical Exam:  Appearance: [ x] Normal [x ] NAD  Eyes: [ x] PERRL [x ] EOMI  HENT: [ x] Normal oral muscosa [x ]NC/AT  Cardiovascular: [x ] S1 [x ] S2 [ x] RRR NO edema, no JVD  Procedural Access Site: L lateral incision and L Kocher incision gauze w/ tegaderm  Respiratory: [ x] Clear to auscultation bilaterally  Gastrointestinal: [ x] Soft [ x] Non-tender [ x] Non-distended   Musculoskeletal: [x ] No clubbing [x ] No joint deformity   Neurologic: [x ] Non-focal  Lymphatic: [x ] No lymphadenopathy  Psychiatry: [ x] AAOx3 [x ] Mood & affect appropriate  Skin: [x ] No rashes [ x] No ecchymoses [x ] No cyanosis        136  |  100  |  14  ----------------------------<  185<H>  4.1   |  25  |  0.96    Ca    8.5      2019 02:39  Phos  3.0       Mg     2.0         TPro  6.4  /  Alb  3.8  /  TBili  0.9  /  DBili  x   /  AST  27  /  ALT  12  /  AlkPhos  39<L>      PT/INR - ( 2019 18:26 )   PT: 14.3 sec;   INR: 1.24 ratio       PTT - ( 2019 18:26 )  PTT:26.1 sec  CARDIAC MARKERS ( 2019 02:39 )  x     / x     / 670 U/L / x     / 2.6 ng/mL  CARDIAC MARKERS ( 2019 18:26 )  x     / x     / 494 U/L / x     / 3.2 ng/mL    ECG: < from: 12 Lead ECG (09.17.15 @ 17:28) >  Diagnosis Line DUAL CHAMBER PACEMAKER    Echo: < from: Limited Transthoracic Echo (19 @ 13:06) >  Normal left ventricular systolic function. No segmental  wall motion abnormalities.  Normal pericardium with no pericardial effusion.    Imaging: < from: Xray Chest 1 View AP/PA (19 @ 13:46) >  IMPRESSION:  Lead tips in good position. No pneumothorax.    Interpretation of Telemetry: V paced 90

## 2019-07-25 NOTE — DISCHARGE NOTE NURSING/CASE MANAGEMENT/SOCIAL WORK - NSDCDPATPORTLINK_GEN_ALL_CORE
You can access the [employer] [facility] Patient Portal, offered by [facility], by registering with the following website: [email or web address]

## 2019-07-25 NOTE — PROGRESS NOTE ADULT - ASSESSMENT
64 year old M with a history of hypertension, hyperlipidemia, pre-diabetes, and dilated cardiomyopathy. The patient also has a history of congestive heart failure. He has an underlying left bundle branch block and underwent implantation of a biventricular ICD in 2009 ,battery changed in 2015. In follow up the patient was found to have significant atrial lead noise in addition ventricular lead noise. He was therefore referred for evaluation for extraction and reimplantation.  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  Now s/p mini-thoracotomy and epicardial LV lead placement 7/24/19 by Dr. Monson.         - Pain control, Toradol PRN. Await repeat limited echo to rule out pericardial effusion  - ICD booklet and ID Card provided to patient.  - Follow up Chest Xray  - Will arrange for out patient follow up in EP clinic.  - S/P device interrogation by Rep. SHAHZAD Whitley -[phone] 64 year old M with a history of hypertension, hyperlipidemia, pre-diabetes, and dilated cardiomyopathy. The patient also has a history of congestive heart failure. He has an underlying left bundle branch block and underwent implantation of a biventricular ICD in 2009 ,battery changed in 2015. In follow up the patient was found to have significant atrial lead noise in addition ventricular lead noise. He was therefore referred for evaluation for extraction and reimplantation.  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  Now s/p mini-thoracotomy and epicardial LV lead placement 7/24/19 by Dr. Monson.         - Pain control, Toradol PRN. Await repeat limited echo to rule out pericardial effusion  - ICD booklet and ID Card provided to patient.  - Follow up Chest Xray  - Out patient follow up in EP clinic on 8/14/19 at 8:45am   - S/P device interrogation by Rep. SHAHZAD Whitley -[phone]

## 2019-07-25 NOTE — DISCHARGE NOTE PROVIDER - CARE PROVIDERS DIRECT ADDRESSES
[email or web address]

## 2019-07-25 NOTE — PROGRESS NOTE ADULT - ASSESSMENT
64 year old M w/ LBBB/CHF s/p fractured ICD lead extraction w/ reimplantation now s/p apical lead placement in OR with some pleural pain    - IV tylenol for pain, repeat limited echo to rule out effusion  - Continue all home meds  - Losartan discontinued for BP WNL  - OOB

## 2019-07-25 NOTE — DISCHARGE NOTE PROVIDER - CARE PROVIDER_API CALL
Jillian Carbone)  Cardiac Electrophysiology; Cardiovascular Disease; Internal Medicine  [address] [place] [id number]  Phone: [phone]  Fax: [phone]  Follow Up Time:     Mukesh Monson)  Surgery; Thoracic Surgery  [address] [place] [address]  Phone: [phone]  Fax: [phone]  Follow Up Time:

## 2019-07-25 NOTE — PROGRESS NOTE ADULT - ATTENDING COMMENTS
Monitor pericardial effusion after coronary sinus perforation.  Patient for epicardial LV lead placement today.
ICD explant.  Re-implant of BiV ICD, but unable to place LV lead in coronary sinus due to coronary sinus perforation.  Epicardial LV lead placed by CT surgery.    Follow-up as outpatient.

## 2019-07-25 NOTE — PROGRESS NOTE ADULT - SUBJECTIVE AND OBJECTIVE BOX
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 Now s/p mini-thoracotomy and epicardial LV lead placement    ====================  NEW EVENTS:  ====================    S/P mini-thoracotomy and epicardial LV lead placement by CT surg; procedure well tolerated     ====================  VITALS (Last 12 hrs):  ====================    T(C): 36.7 (07-24-19 @ 18:00), Max: 36.7 (07-24-19 @ 18:00)  T(F): 98 (07-24-19 @ 18:00), Max: 98 (07-24-19 @ 18:00)  HR: 104 (07-24-19 @ 21:00) (82 - 104)  ABP: 140/66 (07-24-19 @ 21:00) (138/64 - 186/82)  ABP(mean): 88 (07-24-19 @ 21:00) (86 - 122)  RR: 15 (07-24-19 @ 21:00) (11 - 21)  SpO2: 95% (07-24-19 @ 21:00) (92% - 97%)    I&O's Summary    23 Jul 2019 07:01  -  24 Jul 2019 07:00  --------------------------------------------------------  IN: 470 mL / OUT: 2250 mL / NET: -1780 mL    24 Jul 2019 07:01  -  25 Jul 2019 00:56  --------------------------------------------------------  IN: 460 mL / OUT: 800 mL / NET: -340 mL    ====================  NEW LABS:  ====================                        15.7   9.8   )-----------( 136      ( 24 Jul 2019 18:26 )             47.4     07-24    139  |  101  |  8   ----------------------------<  144<H>  3.9   |  23  |  0.86    Ca    8.9      24 Jul 2019 18:26  Phos  3.1     07-24  Mg     2.0     07-24    TPro  6.4  /  Alb  3.9  /  TBili  0.7  /  DBili  x   /  AST  24  /  ALT  11  /  AlkPhos  41  07-24    PT/INR - ( 24 Jul 2019 18:26 )   PT: 14.3 sec;   INR: 1.24 ratio    PTT - ( 24 Jul 2019 18:26 )  PTT:26.1 sec  Creatine Kinase, Serum: 494 U/L <H> (07-24-19 @ 18:26)    CKMB Units: 3.2 ng/mL (07-24 @ 18:26)    ABG - ( 24 Jul 2019 18:13 )  pH, Arterial: 7.33  pH, Blood: x     /  pCO2: 52    /  pO2: 97    / HCO3: 27    / Base Excess: .5    /  SaO2: 97        ====================  PLAN:  63 yo M with LBBB/CHF presented for ICD extraction for fractured lead, ICD reimplantation which was complicated by 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 Now s/p mini-thoracotomy and epicardial LV lead placement by CT surg    - s/p epicardial lead placement- procedure well tolerated monitor V/S   - Pericardial effusion without tamponade physiology, monitor V/S consider TTE if concern for worsening effusion  - Chest pain/discomfort pleuritic in nature, can consider high dose Aspirin if concern for pericarditis     ====================  -   Sang Connolly, Internal Medicine R2  [phone]

## 2019-07-25 NOTE — PROGRESS NOTE ADULT - SUBJECTIVE AND OBJECTIVE BOX
24H hour events: Patient c/o pleuritic chest pain which started post device implant. Denies SOB or palpitations.     MEDICATIONS:  aspirin enteric coated 81 milliGRAM(s) Oral daily  carvedilol 25 milliGRAM(s) Oral every 12 hours    HYDROmorphone  Injectable 1 milliGRAM(s) IV Push every 4 hours PRN  ondansetron Injectable 4 milliGRAM(s) IV Push every 6 hours PRN    docusate sodium 100 milliGRAM(s) Oral three times a day  pantoprazole  Injectable 40 milliGRAM(s) IV Push daily    atorvastatin 10 milliGRAM(s) Oral at bedtime  dextrose 50% Injectable 50 milliLiter(s) IV Push every 15 minutes  dextrose 50% Injectable 25 milliLiter(s) IV Push every 15 minutes    potassium chloride  10 mEq/50 mL IVPB 10 milliEquivalent(s) IV Intermittent every 1 hour  potassium chloride  10 mEq/50 mL IVPB 10 milliEquivalent(s) IV Intermittent every 1 hour  potassium chloride  10 mEq/50 mL IVPB 10 milliEquivalent(s) IV Intermittent every 1 hour      REVIEW OF SYSTEMS:  Complete 10point ROS negative.    PHYSICAL EXAM:  T(C): 37.2 (07-25-19 @ 08:00), Max: 37.2 (07-25-19 @ 08:00)  HR: 96 (07-25-19 @ 13:00) (86 - 104)  BP: 102/55 (07-25-19 @ 13:00) (82/48 - 105/56)  RR: 24 (07-25-19 @ 13:00) (11 - 31)  SpO2: 91% (07-25-19 @ 13:00) (90% - 97%)    24 Jul 2019 07:01  -  25 Jul 2019 07:00  --------------------------------------------------------  IN: 580 mL / OUT: 1300 mL / NET: -720 mL      Appearance: Normal	  Cardiovascular: Normal S1 S2, regular. No JVD, No murmurs, No edema  Respiratory: Lungs clear to auscultation	  Psychiatry: A & O x 3, Mood & affect appropriate  Skin: Left infraclavicular site with Dermabond, open to air, clean and intact.  Under left breast, wound with Dermabond open to air, clean and intact, flat. No swelling on either wound.   Extremities: Normal range of motion, No clubbing, cyanosis or edema  Vascular: Peripheral pulses palpable 2+ bilaterally        LABS:	 	    CBC Full  -  ( 25 Jul 2019 02:35 )  WBC Count : 9.2 K/uL  Hemoglobin : 15.1 g/dL  Hematocrit : 44.8 %  Platelet Count - Automated : 129 K/uL  Mean Cell Volume : 92.1 fl  Mean Cell Hemoglobin : 31.0 pg  Mean Cell Hemoglobin Concentration : 33.6 gm/dL  Auto Neutrophil # : 7.2 K/uL  Auto Lymphocyte # : 0.7 K/uL  Auto Monocyte # : 1.3 K/uL  Auto Eosinophil # : 0.0 K/uL  Auto Basophil # : 0.0 K/uL  Auto Neutrophil % : 77.8 %  Auto Lymphocyte % : 7.3 %  Auto Monocyte % : 14.4 %  Auto Eosinophil % : 0.1 %  Auto Basophil % : 0.3 %    07-25    136  |  100  |  14  ----------------------------<  185<H>  4.1   |  25  |  0.96  07-24    139  |  101  |  8   ----------------------------<  144<H>  3.9   |  23  |  0.86    Ca    8.5      25 Jul 2019 02:39  Ca    8.9      24 Jul 2019 18:26  Phos  3.0     07-25  Phos  3.1     07-24  Mg     2.0     07-25  Mg     2.0     07-24    TPro  6.4  /  Alb  3.8  /  TBili  0.9  /  DBili  x   /  AST  27  /  ALT  12  /  AlkPhos  39<L>  07-25  TPro  6.4  /  Alb  3.9  /  TBili  0.7  /  DBili  x   /  AST  24  /  ALT  11  /  AlkPhos  41  07-24      Creatine Kinase, Serum: 670 U/L (07-25-19 @ 02:39)  Creatine Kinase, Serum: 494 U/L (07-24-19 @ 18:26)      TELEMETRY: 	  atrial sensed, V paced 90's   ECG:  	atrial sensed, V paced 92 bpm. QRS duration 129ms.

## 2019-07-25 NOTE — DISCHARGE NOTE PROVIDER - HOSPITAL COURSE
He has an underlying left bundle branch block and underwent implantation of a biventricular ICD in 2009 ,battery changed in 2015. In follow up the patient was found to have significant atrial lead noise in addition ventricular lead noise. He was therefore referred for evaluation for extraction and reimplantation. Presents to PST for scheduled BIV ICD Lead Extraction and Re Implant on 7/23/19.        New ICD lead to RVA    CS cannulated,  branch from prior lead occluded    attempt to cannulate further with wire resulted in dissection and dye in pericardial space    very small effusion on KATHERINE monitored through remainder of case, without change    hemodynamically stable    Attempt a middle cardiac vein only went to apex    therefore BiV ICD placed with LV port plugged        Pt returned to the OR for an apical lead with CT surgery Dr. Monson. Pt did well. ECHO ruled out pericardial effusion. Pt discharged home with follow up with DR. Monson and Dr. Carbone.         Pt had a lot of pain and prescribed percocet. Pt instructed on the use of percocet and any side effects to be aware of.

## 2019-07-26 ENCOUNTER — INPATIENT (INPATIENT)
Facility: HOSPITAL | Age: 64
LOS: 1 days | Discharge: HOME | DRG: 315 | End: 2019-07-28
Attending: HOSPITALIST | Admitting: HOSPITALIST
Payer: MEDICARE

## 2019-07-26 VITALS
HEIGHT: 66 IN | RESPIRATION RATE: 16 BRPM | HEART RATE: 89 BPM | TEMPERATURE: 101 F | DIASTOLIC BLOOD PRESSURE: 77 MMHG | OXYGEN SATURATION: 95 % | SYSTOLIC BLOOD PRESSURE: 120 MMHG | WEIGHT: 181 LBS

## 2019-07-26 DIAGNOSIS — I10 ESSENTIAL (PRIMARY) HYPERTENSION: ICD-10-CM

## 2019-07-26 DIAGNOSIS — J90 PLEURAL EFFUSION, NOT ELSEWHERE CLASSIFIED: ICD-10-CM

## 2019-07-26 DIAGNOSIS — D69.6 THROMBOCYTOPENIA, UNSPECIFIED: ICD-10-CM

## 2019-07-26 DIAGNOSIS — T82.897A OTHER SPECIFIED COMPLICATION OF CARDIAC PROSTHETIC DEVICES, IMPLANTS AND GRAFTS, INITIAL ENCOUNTER: ICD-10-CM

## 2019-07-26 DIAGNOSIS — R00.2 PALPITATIONS: ICD-10-CM

## 2019-07-26 DIAGNOSIS — I42.9 CARDIOMYOPATHY, UNSPECIFIED: ICD-10-CM

## 2019-07-26 DIAGNOSIS — Z98.89 OTHER SPECIFIED POSTPROCEDURAL STATES: Chronic | ICD-10-CM

## 2019-07-26 DIAGNOSIS — R50.9 FEVER, UNSPECIFIED: ICD-10-CM

## 2019-07-26 DIAGNOSIS — E78.5 HYPERLIPIDEMIA, UNSPECIFIED: ICD-10-CM

## 2019-07-26 DIAGNOSIS — Z29.9 ENCOUNTER FOR PROPHYLACTIC MEASURES, UNSPECIFIED: ICD-10-CM

## 2019-07-26 DIAGNOSIS — Z95.810 PRESENCE OF AUTOMATIC (IMPLANTABLE) CARDIAC DEFIBRILLATOR: Chronic | ICD-10-CM

## 2019-07-26 DIAGNOSIS — R07.81 PLEURODYNIA: ICD-10-CM

## 2019-07-26 DIAGNOSIS — T82.9XXA UNSPECIFIED COMPLICATION OF CARDIAC AND VASCULAR PROSTHETIC DEVICE, IMPLANT AND GRAFT, INITIAL ENCOUNTER: ICD-10-CM

## 2019-07-26 DIAGNOSIS — S55.001A UNSPECIFIED INJURY OF ULNAR ARTERY AT FOREARM LEVEL, RIGHT ARM, INITIAL ENCOUNTER: Chronic | ICD-10-CM

## 2019-07-26 LAB
ALBUMIN SERPL ELPH-MCNC: 3.7 G/DL — SIGNIFICANT CHANGE UP (ref 3.3–5)
ALP SERPL-CCNC: 42 U/L — SIGNIFICANT CHANGE UP (ref 40–120)
ALT FLD-CCNC: 14 U/L — SIGNIFICANT CHANGE UP (ref 10–45)
ANION GAP SERPL CALC-SCNC: 13 MMOL/L — SIGNIFICANT CHANGE UP (ref 5–17)
APPEARANCE UR: CLEAR — SIGNIFICANT CHANGE UP
APTT BLD: 27 SEC — LOW (ref 27.5–36.3)
AST SERPL-CCNC: 29 U/L — SIGNIFICANT CHANGE UP (ref 10–40)
BACTERIA # UR AUTO: NEGATIVE — SIGNIFICANT CHANGE UP
BASOPHILS # BLD AUTO: 0 K/UL — SIGNIFICANT CHANGE UP (ref 0–0.2)
BASOPHILS NFR BLD AUTO: 0.1 % — SIGNIFICANT CHANGE UP (ref 0–2)
BILIRUB SERPL-MCNC: 0.9 MG/DL — SIGNIFICANT CHANGE UP (ref 0.2–1.2)
BILIRUB UR-MCNC: NEGATIVE — SIGNIFICANT CHANGE UP
BUN SERPL-MCNC: 14 MG/DL — SIGNIFICANT CHANGE UP (ref 7–23)
CALCIUM SERPL-MCNC: 9.1 MG/DL — SIGNIFICANT CHANGE UP (ref 8.4–10.5)
CHLORIDE SERPL-SCNC: 97 MMOL/L — SIGNIFICANT CHANGE UP (ref 96–108)
CO2 SERPL-SCNC: 26 MMOL/L — SIGNIFICANT CHANGE UP (ref 22–31)
COLOR SPEC: SIGNIFICANT CHANGE UP
CREAT SERPL-MCNC: 1.12 MG/DL — SIGNIFICANT CHANGE UP (ref 0.5–1.3)
DIFF PNL FLD: NEGATIVE — SIGNIFICANT CHANGE UP
EOSINOPHIL # BLD AUTO: 0 K/UL — SIGNIFICANT CHANGE UP (ref 0–0.5)
EOSINOPHIL NFR BLD AUTO: 0.6 % — SIGNIFICANT CHANGE UP (ref 0–6)
EPI CELLS # UR: 0 /HPF — SIGNIFICANT CHANGE UP
GAS PNL BLDV: SIGNIFICANT CHANGE UP
GLUCOSE SERPL-MCNC: 124 MG/DL — HIGH (ref 70–99)
GLUCOSE UR QL: NEGATIVE — SIGNIFICANT CHANGE UP
HCT VFR BLD CALC: 49 % — SIGNIFICANT CHANGE UP (ref 39–50)
HGB BLD-MCNC: 16.8 G/DL — SIGNIFICANT CHANGE UP (ref 13–17)
HYALINE CASTS # UR AUTO: 0 /LPF — SIGNIFICANT CHANGE UP (ref 0–2)
INR BLD: 1.44 RATIO — HIGH (ref 0.88–1.16)
KETONES UR-MCNC: NEGATIVE — SIGNIFICANT CHANGE UP
LEUKOCYTE ESTERASE UR-ACNC: NEGATIVE — SIGNIFICANT CHANGE UP
LYMPHOCYTES # BLD AUTO: 0.8 K/UL — LOW (ref 1–3.3)
LYMPHOCYTES # BLD AUTO: 13.2 % — SIGNIFICANT CHANGE UP (ref 13–44)
MCHC RBC-ENTMCNC: 31.1 PG — SIGNIFICANT CHANGE UP (ref 27–34)
MCHC RBC-ENTMCNC: 34.2 GM/DL — SIGNIFICANT CHANGE UP (ref 32–36)
MCV RBC AUTO: 90.9 FL — SIGNIFICANT CHANGE UP (ref 80–100)
MONOCYTES # BLD AUTO: 1 K/UL — HIGH (ref 0–0.9)
MONOCYTES NFR BLD AUTO: 15.1 % — HIGH (ref 2–14)
NEUTROPHILS # BLD AUTO: 4.5 K/UL — SIGNIFICANT CHANGE UP (ref 1.8–7.4)
NEUTROPHILS NFR BLD AUTO: 71 % — SIGNIFICANT CHANGE UP (ref 43–77)
NITRITE UR-MCNC: NEGATIVE — SIGNIFICANT CHANGE UP
PH UR: 6.5 — SIGNIFICANT CHANGE UP (ref 5–8)
PLATELET # BLD AUTO: 116 K/UL — LOW (ref 150–400)
POTASSIUM SERPL-MCNC: 3.6 MMOL/L — SIGNIFICANT CHANGE UP (ref 3.5–5.3)
POTASSIUM SERPL-SCNC: 3.6 MMOL/L — SIGNIFICANT CHANGE UP (ref 3.5–5.3)
PROT SERPL-MCNC: 6.9 G/DL — SIGNIFICANT CHANGE UP (ref 6–8.3)
PROT UR-MCNC: SIGNIFICANT CHANGE UP
PROTHROM AB SERPL-ACNC: 16.7 SEC — HIGH (ref 10–12.9)
RBC # BLD: 5.39 M/UL — SIGNIFICANT CHANGE UP (ref 4.2–5.8)
RBC # FLD: 13.7 % — SIGNIFICANT CHANGE UP (ref 10.3–14.5)
RBC CASTS # UR COMP ASSIST: 1 /HPF — SIGNIFICANT CHANGE UP (ref 0–4)
SODIUM SERPL-SCNC: 136 MMOL/L — SIGNIFICANT CHANGE UP (ref 135–145)
SP GR SPEC: 1.01 — SIGNIFICANT CHANGE UP (ref 1.01–1.02)
UROBILINOGEN FLD QL: NEGATIVE — SIGNIFICANT CHANGE UP
WBC # BLD: 6.3 K/UL — SIGNIFICANT CHANGE UP (ref 3.8–10.5)
WBC # FLD AUTO: 6.3 K/UL — SIGNIFICANT CHANGE UP (ref 3.8–10.5)
WBC UR QL: 1 /HPF — SIGNIFICANT CHANGE UP (ref 0–5)

## 2019-07-26 PROCEDURE — 71046 X-RAY EXAM CHEST 2 VIEWS: CPT | Mod: 26

## 2019-07-26 PROCEDURE — 99223 1ST HOSP IP/OBS HIGH 75: CPT

## 2019-07-26 PROCEDURE — 99285 EMERGENCY DEPT VISIT HI MDM: CPT

## 2019-07-26 RX ORDER — ASPIRIN/CALCIUM CARB/MAGNESIUM 324 MG
81 TABLET ORAL DAILY
Refills: 0 | Status: DISCONTINUED | OUTPATIENT
Start: 2019-07-26 | End: 2019-07-28

## 2019-07-26 RX ORDER — ATORVASTATIN CALCIUM 80 MG/1
10 TABLET, FILM COATED ORAL AT BEDTIME
Refills: 0 | Status: DISCONTINUED | OUTPATIENT
Start: 2019-07-26 | End: 2019-07-28

## 2019-07-26 RX ORDER — ASPIRIN/CALCIUM CARB/MAGNESIUM 324 MG
1 TABLET ORAL
Qty: 0 | Refills: 0 | DISCHARGE
Start: 2019-07-26

## 2019-07-26 RX ORDER — CARVEDILOL PHOSPHATE 80 MG/1
25 CAPSULE, EXTENDED RELEASE ORAL EVERY 12 HOURS
Refills: 0 | Status: DISCONTINUED | OUTPATIENT
Start: 2019-07-26 | End: 2019-07-28

## 2019-07-26 RX ORDER — PANTOPRAZOLE SODIUM 20 MG/1
40 TABLET, DELAYED RELEASE ORAL
Refills: 0 | Status: DISCONTINUED | OUTPATIENT
Start: 2019-07-26 | End: 2019-07-28

## 2019-07-26 RX ORDER — COLCHICINE 0.6 MG
0.6 TABLET ORAL EVERY 12 HOURS
Refills: 0 | Status: DISCONTINUED | OUTPATIENT
Start: 2019-07-26 | End: 2019-07-28

## 2019-07-26 RX ADMIN — Medication 0.6 MILLIGRAM(S): at 23:48

## 2019-07-26 RX ADMIN — CARVEDILOL PHOSPHATE 25 MILLIGRAM(S): 80 CAPSULE, EXTENDED RELEASE ORAL at 23:33

## 2019-07-26 NOTE — H&P ADULT - NSHPLABSRESULTS_GEN_ALL_CORE
Personally reviewed available labs, imaging and ekg  Labs  CBC Full  -  ( 2019 18:41 )  WBC Count : 6.3 K/uL  RBC Count : 5.39 M/uL  Hemoglobin : 16.8 g/dL  Hematocrit : 49.0 %  Platelet Count - Automated : 116 K/uL  Mean Cell Volume : 90.9 fl  Mean Cell Hemoglobin : 31.1 pg  Mean Cell Hemoglobin Concentration : 34.2 gm/dL  Auto Neutrophil # : 4.5 K/uL  Auto Lymphocyte # : 0.8 K/uL  Auto Monocyte # : 1.0 K/uL  Auto Eosinophil # : 0.0 K/uL  Auto Basophil # : 0.0 K/uL  Auto Neutrophil % : 71.0 %  Auto Lymphocyte % : 13.2 %  Auto Monocyte % : 15.1 %  Auto Eosinophil % : 0.6 %  Auto Basophil % : 0.1 %        136  |  97  |  14  ----------------------------<  124<H>  3.6   |  26  |  1.12    Ca    9.1      2019 18:41  Phos  3.0       Mg     2.0         TPro  6.9  /  Alb  3.7  /  TBili  0.9  /  DBili  x   /  AST  29  /  ALT  14  /  AlkPhos  42      PT/INR - ( 2019 18:41 )   PT: 16.7 sec;   INR: 1.44 ratio    PTT - ( 2019 18:41 )  PTT:27.0 sec  Urinalysis Basic - ( 2019 20:19 )    Color: Light Yellow / Appearance: Clear / S.014 / pH: x  Gluc: x / Ketone: Negative  / Bili: Negative / Urobili: Negative   Blood: x / Protein: Trace / Nitrite: Negative   Leuk Esterase: Negative / RBC: 1 /hpf / WBC 1 /HPF   Sq Epi: x / Non Sq Epi: 0 /hpf / Bacteria: Negative      CARDIAC MARKERS ( 2019 02:39 )  x     / x     / 670 U/L / x     / 2.6 ng/mL    18:38 - VBG - pH: 7.40  | pCO2: 48    | pO2: 29    | Lactate: 1.4      Imaging  CXR on my review does not demonstrate airspace opacifications  < from: Xray Chest 2 Views PA/Lat (19 @ 19:42) >  ******PRELIMINARY REPORT******        INTERPRETATION:  Bilateral pleural effusions with assocaited atelectasis.  < end of copied text >  < from: Xray Chest 1 View- PORTABLE-Routine (19 @ 08:24) >  Comparison: 2019.   The mediastinal cardiac silhouette is unremarkable. Endotracheal tube is   been removed. Other lines and tubes and support devices are unchanged.  Low lung volumes. Bibasilar atelectasis possible small bilateral   effusions.  No acute osseous finding.   IMPRESSION:  Endotracheal tube withdrawn otherwise no significant change.    EKG: atrial sensed v-paced 88bpm

## 2019-07-26 NOTE — H&P ADULT - NSHPSOCIALHISTORY_GEN_ALL_CORE
former social smoker quit >30 year ago (used 3mo per year 1 pack per month maximum), no drugs,  and lives on [place]

## 2019-07-26 NOTE — H&P ADULT - NSICDXPASTMEDICALHX_GEN_ALL_CORE_FT
PAST MEDICAL HISTORY:  Cardiomyopathy     Cardiomyopathy       Diverticulitis past history    Diverticulosis     Dyslipidemia     GERD (gastroesophageal reflux disease)     History of hemochromatosis ? pt followed by hematologist no treatmnet -lab values is normal as per patient    Hyperlipidemia     Hypertension     Hypertension     Lumbar radiculopathy

## 2019-07-26 NOTE — CONSULT NOTE ADULT - SUBJECTIVE AND OBJECTIVE BOX
CHIEF COMPLAINT: "My chest is jumping"     HISTORY OF PRESENT ILLNESS:      Allergies    adhesives (Other)  adhesives (Rash)  fentanyl (Seizure)  seasonal allergies (Rhinitis)  Seasonal Allergies (Rhinorrhea)  Vasotec (Rash)  Vasotec (Swelling)    Intolerances    	    MEDICATIONS:                  PAST MEDICAL & SURGICAL HISTORY:  History of hemochromatosis: ? pt followed by hematologist no treatmnet -lab values is normal as per patient  Hypertension  Lumbar radiculopathy  Diverticulitis: past history  Hyperlipidemia  Cardiomyopathy:     GERD (gastroesophageal reflux disease)  Diverticulosis  Hypertension  Cardiomyopathy  Dyslipidemia  AICD (automatic cardioverter/defibrillator) present: 2009, battery change 9/2015  Injury of right ulnar artery: s/p surgical repair 2009  History of lumbar laminectomy: x2 - 2000, 2001 - s/p accident at work  AICD (automatic cardioverter/defibrillator) present: implanted 9/2009  left chest   model # 3207- 36      FAMILY HISTORY:      SOCIAL HISTORY:    [ ] Non-smoker  [ ] Smoker  [ ] Alcohol      REVIEW OF SYSTEMS:  See HPI. Otherwise, 10 point ROS done and otherwise negative.    PHYSICAL EXAM:  T(C): 37.6 (07-26-19 @ 17:28), Max: 38.2 (07-26-19 @ 16:09)  HR: 88 (07-26-19 @ 17:28) (88 - 89)  BP: 133/79 (07-26-19 @ 17:28) (120/77 - 133/79)  RR: 18 (07-26-19 @ 17:28) (16 - 18)  SpO2: 97% (07-26-19 @ 17:28) (95% - 97%)  Wt(kg): --  I&O's Summary      Appearance: Normal	  HEENT:   Normal oral mucosa, PERRL, EOMI	  Lymphatic: No lymphadenopathy  Cardiovascular: Normal S1 S2, No JVD, No murmurs, No edema  Respiratory: Lungs clear to auscultation	  Psychiatry: A & O x 3, Mood & affect appropriate  Gastrointestinal:  Soft, Non-tender, + BS	  Skin: No rashes, No ecchymoses, No cyanosis	  Neurologic: Non-focal  Extremities: Normal range of motion, No clubbing, cyanosis or edema  Vascular: Peripheral pulses palpable 2+ bilaterally        LABS:	 	    CBC Full  -  ( 25 Jul 2019 02:35 )  WBC Count : 9.2 K/uL  Hemoglobin : 15.1 g/dL  Hematocrit : 44.8 %  Platelet Count - Automated : 129 K/uL  Mean Cell Volume : 92.1 fl  Mean Cell Hemoglobin : 31.0 pg  Mean Cell Hemoglobin Concentration : 33.6 gm/dL  Auto Neutrophil # : 7.2 K/uL  Auto Lymphocyte # : 0.7 K/uL  Auto Monocyte # : 1.3 K/uL  Auto Eosinophil # : 0.0 K/uL  Auto Basophil # : 0.0 K/uL  Auto Neutrophil % : 77.8 %  Auto Lymphocyte % : 7.3 %  Auto Monocyte % : 14.4 %  Auto Eosinophil % : 0.1 %  Auto Basophil % : 0.3 %    07-25    136  |  100  |  14  ----------------------------<  185<H>  4.1   |  25  |  0.96  07-24    139  |  101  |  8   ----------------------------<  144<H>  3.9   |  23  |  0.86    Ca    8.5      25 Jul 2019 02:39  Ca    8.9      24 Jul 2019 18:26  Phos  3.0     07-25  Phos  3.1     07-24  Mg     2.0     07-25  Mg     2.0     07-24    TPro  6.4  /  Alb  3.8  /  TBili  0.9  /  DBili  x   /  AST  27  /  ALT  12  /  AlkPhos  39<L>  07-25  TPro  6.4  /  Alb  3.9  /  TBili  0.7  /  DBili  x   /  AST  24  /  ALT  11  /  AlkPhos  41  07-24      proBNP:   Lipid Profile:   HgA1c:   TSH:       CARDIAC MARKERS:                TELEMETRY: 	    ECG:  	  RADIOLOGY:  OTHER: 	    PREVIOUS DIAGNOSTIC TESTING:    [ ] Echocardiogram:  [ ]  Catheterization:  [ ] Stress Test:  	  	  ASSESSMENT/PLAN:

## 2019-07-26 NOTE — CONSULT NOTE ADULT - PROBLEM SELECTOR RECOMMENDATION 3
-LLL diminished, patient not taking deep breaths due to pleuritic pain  -low grade temperature 100.8, check blood cultures, urine culture, chest xray  -above plan discussed with ER team, patient currently in registration awaiting ER evaluation.  Will discuss patient with over night by CCU NP/PA.     SHAHZAD Whit[place] NP [id number]

## 2019-07-26 NOTE — H&P ADULT - NSHPPHYSICALEXAM_GEN_ALL_CORE
Vital Signs Last 24 Hrs  T(C): 37.3 (26 Jul 2019 21:46), Max: 38.2 (26 Jul 2019 16:09)  T(F): 99.2 (26 Jul 2019 21:46), Max: 100.8 (26 Jul 2019 16:09)  HR: 84 (26 Jul 2019 21:46) (81 - 92)  BP: 144/81 (26 Jul 2019 21:46) (120/77 - 156/90)  BP(mean): --  RR: 18 (26 Jul 2019 21:46) (16 - 18)  SpO2: 98% (26 Jul 2019 21:46) (95% - 98%) on room air    GENERAL: NAD, well-developed  HEAD:  Atraumatic, Normocephalic  EYES: EOMI, PERRLA, conjunctiva and sclera clear  Mouth: MMM, no lesions  NECK: Supple, no appreciable masses  Lung: normal work of breathing, cta b/l  Chest: S1&S2+, rrr, no m/r/g appreciated, surgical scar near left exilla and under left breast w/o purulence/erythema or pain to palpation  ABDOMEN: bs+, soft, nt, nd, no appreciable masses  : No briggs catheter, no CVA tenderness  EXTREMITIES:  radial pulse present b/l, PT present b/l, no pitting edema present  Neuro: A&Ox3, no focal deficits  SKIN: warm and dry, no visible rashes Vital Signs Last 24 Hrs  T(C): 37.3 (26 Jul 2019 21:46), Max: 38.2 (26 Jul 2019 16:09)  T(F): 99.2 (26 Jul 2019 21:46), Max: 100.8 (26 Jul 2019 16:09)  HR: 84 (26 Jul 2019 21:46) (81 - 92)  BP: 144/81 (26 Jul 2019 21:46) (120/77 - 156/90)  BP(mean): --  RR: 18 (26 Jul 2019 21:46) (16 - 18)  SpO2: 98% (26 Jul 2019 21:46) (95% - 98%) on room air    GENERAL: NAD, well-developed  HEAD:  Atraumatic, Normocephalic  EYES: EOMI, PERRLA, conjunctiva and sclera clear  Mouth: MMM, no lesions  NECK: Supple, no appreciable masses  Lung: normal work of breathing, dullness of left base with no air entry otherwise clear on auscultations  Chest: S1&S2+, rrr, no m/r/g appreciated, surgical scar near left exilla and under left breast w/o purulence/erythema or pain to palpation  ABDOMEN: bs+, soft, nt, nd, no appreciable masses  : No briggs catheter, no CVA tenderness  EXTREMITIES:  radial pulse present b/l, PT present b/l, no pitting edema present  Neuro: A&Ox3, no focal deficits  SKIN: warm and dry, no visible rashes

## 2019-07-26 NOTE — ED ADULT NURSE NOTE - OBJECTIVE STATEMENT
65 y/o male history of HTN, diverticulitis, HLD, cardiomyopathy, GERD, presents to the ED from EP office c/o AICD complications. Patient states that two days ago he had an AICD placed and had developed SOB as well as being able to see his heart beat. Patient states that he was having pain near the incision. EP nurse readjusted pacemaker in ED waiting room and patient reports relief of symptoms. Denies fever, chills, n/v, weakness, abd pain, diarrhea/constipation, numbness/tingling, urinary s/s. Patient A&Ox3, in no respiratory distress, and denies Chest pain at this time. Strong peripheral pulses. Chest rise symmetrical. Patient ambulated in ED. Placed on CM, paced at NSR.

## 2019-07-26 NOTE — ED PROVIDER NOTE - PHYSICAL EXAMINATION
A&Ox3, NAD. NCAT. PERRL, EOMI. Neck supple, no LAD. Lungs CTAB. +S1S2, RRR, + systolic murmur, no r/g. Abd soft, NT/ND, +BS, no rebound or guarding. Extremities: cap refill <2, pulses in distal extremities 4+, no edema. Skin: two approx 4 cm linear incision to superior chest wall without rash, drainage, discharge, tenderness or erythema. CN II-XII intact. Strength 5/5 UE/LE. Sensations intact throughout. Gait steady.

## 2019-07-26 NOTE — H&P ADULT - PROBLEM SELECTOR PLAN 8
IMPROVE VTE Individual Risk Assessment  RISK                                                                Points  [  ] Previous VTE                                                  3  [  ] Thrombophilia                                               2  [  ] Lower limb paralysis                                      2        (unable to hold up >15 seconds)    [  ] Current Cancer                                              2         (within 6 months)  [  ] Immobilization > 24 hrs                                1  [  ] ICU/CCU stay > 24 hours                              1  [  ] Age > 60                                                      1  IMPROVE VTE Score 0, Low risk and not requiring DVT ppx    IMPROVE Score 0-1: Low Risk, No VTE prophylaxis required for most patients, encourage ambulation.   IMPROVE Score 2-3: At risk, pharmacologic VTE prophylaxis is indicated for most patients (in the absence of a contraindication)  IMPROVE Score > or = 4: High Risk, pharmacologic VTE prophylaxis is indicated for most patients (in the absence of a contraindication)

## 2019-07-26 NOTE — H&P ADULT - PROBLEM SELECTOR PLAN 2
monitor overnight, no leukocytosis on admit, UA wnl  - CXR with pleural effusion postop which may have led to fever  - blood culture and urine cx collected  - non-toxic and therefore will monitor off antibiotics  - TTE in am per EP to evaluate for effusion  - surgical site w/o purulence monitor overnight, no leukocytosis on admit, UA wnl  - CXR with pleural effusion postop which may have led to fever; additionally may have fever from pericardia inflammation  - blood culture and urine cx collected  - non-toxic and therefore will monitor off antibiotics  - TTE in am per EP to evaluate for effusion  - surgical site w/o purulence

## 2019-07-26 NOTE — ED PROVIDER NOTE - PROGRESS NOTE DETAILS
Spoke with EP Spoke with EP NP Tala who evaluated pt in triage, states his pacemaker was adjusted and now pt is feeling improvement of symptoms, is concerned however that pt had fever in triage, mostly likely tele medicine admission, recommends cxr, blood cultures, TTE. -Maricarmen Fontana PA-C Spoke with cards fellow, spoke with EP NP who agrees with tele medicine admission, will follow. -Maricarmen Fontana PA-C

## 2019-07-26 NOTE — H&P ADULT - PROBLEM SELECTOR PLAN 1
Resolved with EP intervention  - c/w carvedilol, Tele monitoring, Pacing on EKG  - TTE in am per EP to evaluate effusion

## 2019-07-26 NOTE — H&P ADULT - PROBLEM SELECTOR PLAN 3
noted on cxr, pending final radiology report. does not look grossly worse than Xray on previous day  - no hypoxia, worsening dyspnea

## 2019-07-26 NOTE — ED PROVIDER NOTE - ATTENDING CONTRIBUTION TO CARE
Patient with complicated cardiac history, recently admitted after complex pacemaker change, presenting with chest wall heaving.  Seen by EP in waiting room and pacemaker adjusted (had been triggering diaphragm) and now feeling better, however also noted to be febrile in waiting room.  Denying home fevers/chills, no headaches, no neck pains, no shortness of breath, no abdominal pains, no nausea/vomiting, no dysuis, no cough.    A 14 point review of systems is negative except as in HPI or otherwise documented.    Exam:  General: Patient well appearing, vital signs within normal limits  HEENT: airway patent with moist mucous membranes  Cardiac: RRR S1/S2 with strong peripheral pulses  Respiratory: lungs clear without respiratory distress  GI: abdomen soft, non tender, non distended  Neuro: no gross neurologic deficits  Skin: warm, well perfused  Psych: normal mood and affect    Patient now well appearing however with fevers after recent admission, no focal findings on exam and presenting symptoms corrected by EP adjusting pacemaker.  Per discussion with EP, plan for labs, cultures, CXR, echo, admission pending culture results to r/o occult infection.

## 2019-07-26 NOTE — CONSULT NOTE ADULT - ASSESSMENT
64 year old M with a history of hypertension, hyperlipidemia, pre-diabetes, and dilated cardiomyopathy, congestive heart failure. He has an underlying left bundle branch block and underwent implantation of a biventricular ICD in 2009 , generator changed in 2015. In follow up the patient was found to have significant atrial lead noise in addition ventricular lead noise/ fractured lead. He underwent a extraction on 7/23/19.  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  Now s/p mini-thoracotomy and epicardial LV lead placement 7/24/19 by Dr. Monson with new [employer] Bi-V ICD.  Patient called EP Clinic today with complaint of intermittent pulsation sensation on left chest, and continued pleuritic chest pain which started post op.  Patient was advised to come in to the ER for evaluation.  Patient seen in ED triage, found to have low grade temperature of 100.8.

## 2019-07-26 NOTE — ED PROVIDER NOTE - NS ED ROS FT
Constitutional: No fever or chills  Eyes: No visual changes, eye pain or redness  HEENT: No throat pain, ear pain, nasal pain. No nose bleeding.  CV: see hpi  Resp: No SOB no cough  GI: No abd pain. No nausea or vomiting. No diarrhea. No constipation.   : No dysuria, hematuria.   MSK: No musculoskeletal pain  Skin: No rash  Neuro: No headache. No numbness or tingling. No weakness.

## 2019-07-26 NOTE — CONSULT NOTE ADULT - SUBJECTIVE AND OBJECTIVE BOX
CHIEF COMPLAINT: "My chest is jumping"     HISTORY OF PRESENT ILLNESS:     64 year old M with a history of hypertension, hyperlipidemia, pre-diabetes, and dilated cardiomyopathy, congestive heart failure. He has an underlying left bundle branch block and underwent implantation of a biventricular ICD in 2009 , generator changed in 2015. In follow up the patient was found to have significant atrial lead noise in addition ventricular lead noise/ fractured lead. He underwent a extraction on 7/23/19.  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  Now s/p mini-thoracotomy and epicardial LV lead placement 7/24/19 by Dr. Monson with new [employer] Bi-V ICD.  Patient called EP Clinic today with complaint of pulsation sensation on left chest, and continued pleuritic chest pain which started post op.  Patient was advised to come in to the ER for evaluation.  Patient seen in ED triage, found to have low grade temperature of 100.8.  Denies c/o CP, palpitations, dizziness, lightheadedness, SOB, fever, chills, n/v/d, abdominal pain, dysuria, cough, throat pain or congestion.       Allergies    adhesives (Other)  adhesives (Rash)  fentanyl (Seizure)  seasonal allergies (Rhinitis)  Seasonal Allergies (Rhinorrhea)  Vasotec (Rash)  Vasotec (Swelling)      MEDICATIONS:    Aspirin 81mg daily  Coreg 25mg BID  Livalo  Dexilant  CoQ10     PAST MEDICAL & SURGICAL HISTORY:  History of hemochromatosis: ? pt followed by hematologist no treatmnet -lab values is normal as per patient  Hypertension  Lumbar radiculopathy  Diverticulitis: past history  Hyperlipidemia  Cardiomyopathy:     GERD (gastroesophageal reflux disease)  Diverticulosis  Hypertension  Cardiomyopathy  Dyslipidemia  AICD (automatic cardioverter/defibrillator) present: 2009, battery change 9/2015  Injury of right ulnar artery: s/p surgical repair 2009  History of lumbar laminectomy: x2 - 2000, 2001 - s/p accident at work  AICD (automatic cardioverter/defibrillator) present: implanted 9/2009  left chest   model # 3207- 36    REVIEW OF SYSTEMS:  See HPI. Otherwise, 10 point ROS done and otherwise negative.    PHYSICAL EXAM:  T(C): 37.6 (07-26-19 @ 17:28), Max: 38.2 (07-26-19 @ 16:09)  HR: 88 (07-26-19 @ 17:28) (88 - 89)  BP: 133/79 (07-26-19 @ 17:28) (120/77 - 133/79)  RR: 18 (07-26-19 @ 17:28) (16 - 18)  SpO2: 97% (07-26-19 @ 17:28) (95% - 97%)    Appearance: Normal	  Cardiovascular: Normal S1 S2, regular. No JVD, No murmurs, No edema  Respiratory: Lungs clear to auscultation	  Psychiatry: A & O x 3, Mood & affect appropriate  Gastrointestinal:  Soft, Non-tender, + BS	  Skin:  Extremities: Normal range of motion, No clubbing, cyanosis or edema  Vascular: Peripheral pulses palpable 2+ bilaterally    LABS:	 	    CBC Full  -  ( 25 Jul 2019 02:35 )  WBC Count : 9.2 K/uL  Hemoglobin : 15.1 g/dL  Hematocrit : 44.8 %  Platelet Count - Automated : 129 K/uL  Mean Cell Volume : 92.1 fl  Mean Cell Hemoglobin : 31.0 pg  Mean Cell Hemoglobin Concentration : 33.6 gm/dL  Auto Neutrophil # : 7.2 K/uL  Auto Lymphocyte # : 0.7 K/uL  Auto Monocyte # : 1.3 K/uL  Auto Eosinophil # : 0.0 K/uL  Auto Basophil # : 0.0 K/uL  Auto Neutrophil % : 77.8 %  Auto Lymphocyte % : 7.3 %  Auto Monocyte % : 14.4 %  Auto Eosinophil % : 0.1 %  Auto Basophil % : 0.3 %    07-25    136  |  100  |  14  ----------------------------<  185<H>  4.1   |  25  |  0.96  07-24    139  |  101  |  8   ----------------------------<  144<H>  3.9   |  23  |  0.86    Ca    8.5      25 Jul 2019 02:39  Ca    8.9      24 Jul 2019 18:26  Phos  3.0     07-25  Phos  3.1     07-24  Mg     2.0     07-25  Mg     2.0     07-24    TPro  6.4  /  Alb  3.8  /  TBili  0.9  /  DBili  x   /  AST  27  /  ALT  12  /  AlkPhos  39<L>  07-25  TPro  6.4  /  Alb  3.9  /  TBili  0.7  /  DBili  x   /  AST  24  /  ALT  11  /  AlkPhos  41  07-24    TELEMETRY: 	  Patient in registration area, not on telemetry, no EKG done yet  ECG: CHIEF COMPLAINT: "My chest is jumping"     HISTORY OF PRESENT ILLNESS:     64 year old M with a history of hypertension, hyperlipidemia, pre-diabetes, and dilated cardiomyopathy, congestive heart failure. He has an underlying left bundle branch block and underwent implantation of a biventricular ICD in 2009 , generator changed in 2015. In follow up the patient was found to have significant atrial lead noise in addition ventricular lead noise/ fractured lead. He underwent a extraction on 7/23/19.  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  Now s/p mini-thoracotomy and epicardial LV lead placement 7/24/19 by Dr. Monson with new [employer] Bi-V ICD.  Patient called EP Clinic today with complaint of pulsation sensation on left chest, and continued pleuritic chest pain which started post op.  Patient was advised to come in to the ER for evaluation.  Patient seen in ED triage, found to have low grade temperature of 100.8.  Denies c/o CP, palpitations, dizziness, lightheadedness, SOB, fever, chills, n/v/d, abdominal pain, dysuria, cough, throat pain or congestion.       Allergies    adhesives (Other)  adhesives (Rash)  fentanyl (Seizure)  seasonal allergies (Rhinitis)  Seasonal Allergies (Rhinorrhea)  Vasotec (Rash)  Vasotec (Swelling)      MEDICATIONS:    Aspirin 81mg daily  Coreg 25mg BID  Livalo  Dexilant  CoQ10     PAST MEDICAL & SURGICAL HISTORY:  History of hemochromatosis: ? pt followed by hematologist no treatmnet -lab values is normal as per patient  Hypertension  Lumbar radiculopathy  Diverticulitis: past history  Hyperlipidemia  Cardiomyopathy:     GERD (gastroesophageal reflux disease)  Diverticulosis  Hypertension  Cardiomyopathy  Dyslipidemia  AICD (automatic cardioverter/defibrillator) present: 2009, battery change 9/2015  Injury of right ulnar artery: s/p surgical repair 2009  History of lumbar laminectomy: x2 - 2000, 2001 - s/p accident at work  AICD (automatic cardioverter/defibrillator) present: implanted 9/2009  left chest   model # 3207- 36    REVIEW OF SYSTEMS:  See HPI. Otherwise, 10 point ROS done and otherwise negative.    PHYSICAL EXAM:  T(C): 37.6 (07-26-19 @ 17:28), Max: 38.2 (07-26-19 @ 16:09)  HR: 88 (07-26-19 @ 17:28) (88 - 89)  BP: 133/79 (07-26-19 @ 17:28) (120/77 - 133/79)  RR: 18 (07-26-19 @ 17:28) (16 - 18)  SpO2: 97% (07-26-19 @ 17:28) (95% - 97%)    Appearance: Normal	  Cardiovascular: Normal S1 S2, regular. No JVD, No murmurs, No edema  Respiratory: Lungs clear, diminished LLL   Psychiatry: A & O x 3, Mood & affect appropriate  Gastrointestinal:  Soft, Non-tender, + BS	  Skin: Left infraclavicular site open to air with Dermabond, no swelling or drainage, left thoracotomy site open to air with Dermabond, no swelling or drainage.   Extremities: Normal range of motion, No clubbing, cyanosis or edema  Vascular: Peripheral pulses palpable 2+ bilaterally    LABS:	 	    CBC Full  -  ( 25 Jul 2019 02:35 )  WBC Count : 9.2 K/uL  Hemoglobin : 15.1 g/dL  Hematocrit : 44.8 %  Platelet Count - Automated : 129 K/uL  Mean Cell Volume : 92.1 fl  Mean Cell Hemoglobin : 31.0 pg  Mean Cell Hemoglobin Concentration : 33.6 gm/dL  Auto Neutrophil # : 7.2 K/uL  Auto Lymphocyte # : 0.7 K/uL  Auto Monocyte # : 1.3 K/uL  Auto Eosinophil # : 0.0 K/uL  Auto Basophil # : 0.0 K/uL  Auto Neutrophil % : 77.8 %  Auto Lymphocyte % : 7.3 %  Auto Monocyte % : 14.4 %  Auto Eosinophil % : 0.1 %  Auto Basophil % : 0.3 %    07-25    136  |  100  |  14  ----------------------------<  185<H>  4.1   |  25  |  0.96  07-24    139  |  101  |  8   ----------------------------<  144<H>  3.9   |  23  |  0.86    Ca    8.5      25 Jul 2019 02:39  Ca    8.9      24 Jul 2019 18:26  Phos  3.0     07-25  Phos  3.1     07-24  Mg     2.0     07-25  Mg     2.0     07-24    TPro  6.4  /  Alb  3.8  /  TBili  0.9  /  DBili  x   /  AST  27  /  ALT  12  /  AlkPhos  39<L>  07-25  TPro  6.4  /  Alb  3.9  /  TBili  0.7  /  DBili  x   /  AST  24  /  ALT  11  /  AlkPhos  41  07-24    TELEMETRY: 	  Patient in registration area, not on telemetry, no EKG done yet  ECG:

## 2019-07-26 NOTE — ED PROVIDER NOTE - PMH
Cardiomyopathy      Cardiomyopathy    Diverticulitis  past history  Diverticulosis    Dyslipidemia    GERD (gastroesophageal reflux disease)    History of hemochromatosis  ? pt followed by hematologist no treatmnet -lab values is normal as per patient  Hyperlipidemia    Hypertension    Hypertension    Lumbar radiculopathy

## 2019-07-26 NOTE — H&P ADULT - ASSESSMENT
64M w/ dilated cardiomyopathy w/ LBBB s/p BiV-ICD, HTN, HLD, and recent admit for lead fracture s/p extraction (7/23/19) however requiring ICD reimplantation c/b LV lead placement failure secondary coronary sinus dissection w/ small pericardial effusion discharged 7/25/19 no presenting to [facility] for evaluation of palpitations and admitted for monitoring and evaluation of fever.

## 2019-07-26 NOTE — H&P ADULT - HISTORY OF PRESENT ILLNESS
INCOMPLETE  64M w/ dilated cardiomyopathy w/ LBBB s/p BiV-ICD, HTN, HLD, and recent admit for lead fracture s/p extraction (7/23/19) however requiring ICD reimplantation c/b 64M w/ dilated cardiomyopathy w/ LBBB s/p BiV-ICD, HTN, HLD, and recent admit for lead fracture s/p extraction (7/23/19) however requiring ICD reimplantation c/b LV lead placement failure secondary coronary sinus dissection w/ small pericardial effusion discharged 7/25/19 no presenting to [facility] for evaluation of palpitations. Patient reports that after discharge last night, he developed palpitations with chest wall movement not associated with chest pain, sob, cough, diaphoresis and indicated it was persistent through the morning and when he called EP he as instructed to present to the hospital. When his PPM was reset with lower voltage the palpitations ceased. he was found to have fever in the ED but did not feel it. He had cough x1 w/o blood or yellow mucous in am, no painful urination, no n/v/d. He continues to have muscle tenderness over chest wall associated with surgical procedure.    In the ED, .8, 120/77, 89, 16 95%RA

## 2019-07-26 NOTE — CONSULT NOTE ADULT - PROBLEM SELECTOR RECOMMENDATION 2
-Patient with small pericardial effusion intra op.   Repeat TTE prior to discharge was without effusion  -Recommend repeating TTE now to assess for reaccumulation of effusion.  -If no effusion would recommend Colchicine for likely pericarditis

## 2019-07-26 NOTE — ED PROVIDER NOTE - PSH
AICD (automatic cardioverter/defibrillator) present  2009, battery change 9/2015  AICD (automatic cardioverter/defibrillator) present  implanted 9/2009  left chest   model # 3207- 36  History of lumbar laminectomy  x2 - 2000, 2001 - s/p accident at work  Injury of right ulnar artery  s/p surgical repair 2009

## 2019-07-26 NOTE — ED PROVIDER NOTE - OBJECTIVE STATEMENT
63 yo male with pmh of cardiomyopathy , recent AICD replacement 2 d ago presenting for sob and pleuritic CP 65 yo male with pmh of cardiomyopathy , recent AICD replacement 2 d ago presenting for sob and pleuritic CP, sob and could visibly see his chest beating, called his EP office (Dr. Carbone) who directed pt to the ED. Pt states EP nurse came to adjust pacemaker and now feels improvement in symptoms however had recorded fever in triage of 100.8. denies fevers/chills at home, denies, no redness, swelling, discharge of incisions on chest, no abdominal pain, no lightheadedness, dizziness, no loc.

## 2019-07-26 NOTE — CONSULT NOTE ADULT - PROBLEM SELECTOR RECOMMENDATION 9
-s/p device interrogation in ER and reprogramming  Leads tested at high output in all leads and vectors.  LV Bipolar lead had diaphragmatic stimulation at threshold of 7.5V and ceased at 2.5V.  LV output was lowered from 2.5V to 1.75V.   -continue to monitor patient post device changes  -check chest xray to evaluate lead placement  -Left message with Dr. Monson to make aware of patient in ER and current status

## 2019-07-26 NOTE — CONSULT NOTE ADULT - CONSULT REASON
Patient called EP clinic today with pulsating sensation in chest, pleuritic chest pain. Advised to go to ER

## 2019-07-26 NOTE — H&P ADULT - NSHPREVIEWOFSYSTEMS_GEN_ALL_CORE
CONSTITUTIONAL: No subjective fever, no chills  EYES: No eye pain, no visual disturbance  Mouth: no pain in mouth, no cuts  RESPIRATORY: No cough, No sob  CARDIOVASCULAR: No CP, palpitations+  GASTROINTESTINAL: no abdominal pain, no n/v/d  GENITOURINARY: No dysuria, no hematuria  Heme: No easy bruising, no swelling appreciated in neck/armpit/groind  NEUROLOGICAL: No headaches, no weakness  SKIN: No itching, no rashes  MUSCULOSKELETAL: No joint pain, no joint swelling, muscle pain from surgery CONSTITUTIONAL: No subjective fever, no chills  EYES: No eye pain, no visual disturbance  Mouth: no pain in mouth, no cuts  RESPIRATORY: coughx1, some sob on exertion unchanged from post-op  CARDIOVASCULAR: No CP, palpitations+  GASTROINTESTINAL: no abdominal pain, no n/v/d  GENITOURINARY: No dysuria, no hematuria  Heme: No easy bruising, no swelling appreciated in neck/armpit/groind  NEUROLOGICAL: No headaches, no weakness  SKIN: No itching, no rashes  MUSCULOSKELETAL: No joint pain, no joint swelling, muscle pain from surgery

## 2019-07-26 NOTE — H&P ADULT - PROBLEM SELECTOR PLAN 4
Dilated cardiomyopathy  - etiology 2/2 hemochromatosis?  - TTE in am, cards following  - c/w carvedilol, statin  - hx of angioedema w/ ace-inhibitor

## 2019-07-27 LAB
ALBUMIN SERPL ELPH-MCNC: 3.1 G/DL — LOW (ref 3.3–5)
ALP SERPL-CCNC: 37 U/L — LOW (ref 40–120)
ALT FLD-CCNC: 12 U/L — SIGNIFICANT CHANGE UP (ref 10–45)
ANION GAP SERPL CALC-SCNC: 11 MMOL/L — SIGNIFICANT CHANGE UP (ref 5–17)
APTT BLD: 26.1 SEC — LOW (ref 27.5–36.3)
AST SERPL-CCNC: 22 U/L — SIGNIFICANT CHANGE UP (ref 10–40)
BASOPHILS # BLD AUTO: 0 K/UL — SIGNIFICANT CHANGE UP (ref 0–0.2)
BASOPHILS NFR BLD AUTO: 0 % — SIGNIFICANT CHANGE UP (ref 0–2)
BILIRUB SERPL-MCNC: 0.7 MG/DL — SIGNIFICANT CHANGE UP (ref 0.2–1.2)
BUN SERPL-MCNC: 14 MG/DL — SIGNIFICANT CHANGE UP (ref 7–23)
CALCIUM SERPL-MCNC: 8.8 MG/DL — SIGNIFICANT CHANGE UP (ref 8.4–10.5)
CHLORIDE SERPL-SCNC: 102 MMOL/L — SIGNIFICANT CHANGE UP (ref 96–108)
CO2 SERPL-SCNC: 26 MMOL/L — SIGNIFICANT CHANGE UP (ref 22–31)
CREAT SERPL-MCNC: 1.03 MG/DL — SIGNIFICANT CHANGE UP (ref 0.5–1.3)
EOSINOPHIL # BLD AUTO: 0.06 K/UL — SIGNIFICANT CHANGE UP (ref 0–0.5)
EOSINOPHIL NFR BLD AUTO: 1 % — SIGNIFICANT CHANGE UP (ref 0–6)
GLUCOSE SERPL-MCNC: 138 MG/DL — HIGH (ref 70–99)
HCT VFR BLD CALC: 39.1 % — SIGNIFICANT CHANGE UP (ref 39–50)
HCV AB S/CO SERPL IA: 0.11 S/CO — SIGNIFICANT CHANGE UP (ref 0–0.99)
HCV AB SERPL-IMP: SIGNIFICANT CHANGE UP
HGB BLD-MCNC: 13.1 G/DL — SIGNIFICANT CHANGE UP (ref 13–17)
INR BLD: 1.2 RATIO — HIGH (ref 0.88–1.16)
LYMPHOCYTES # BLD AUTO: 0.88 K/UL — LOW (ref 1–3.3)
LYMPHOCYTES # BLD AUTO: 15 % — SIGNIFICANT CHANGE UP (ref 13–44)
MAGNESIUM SERPL-MCNC: 2.2 MG/DL — SIGNIFICANT CHANGE UP (ref 1.6–2.6)
MANUAL SMEAR VERIFICATION: SIGNIFICANT CHANGE UP
MCHC RBC-ENTMCNC: 30 PG — SIGNIFICANT CHANGE UP (ref 27–34)
MCHC RBC-ENTMCNC: 33.5 GM/DL — SIGNIFICANT CHANGE UP (ref 32–36)
MCV RBC AUTO: 89.5 FL — SIGNIFICANT CHANGE UP (ref 80–100)
MONOCYTES # BLD AUTO: 1.29 K/UL — HIGH (ref 0–0.9)
MONOCYTES NFR BLD AUTO: 22 % — HIGH (ref 2–14)
NEUTROPHILS # BLD AUTO: 3.53 K/UL — SIGNIFICANT CHANGE UP (ref 1.8–7.4)
NEUTROPHILS NFR BLD AUTO: 60 % — SIGNIFICANT CHANGE UP (ref 43–77)
PHOSPHATE SERPL-MCNC: 2.2 MG/DL — LOW (ref 2.5–4.5)
PLAT MORPH BLD: NORMAL — SIGNIFICANT CHANGE UP
PLATELET # BLD AUTO: 113 K/UL — LOW (ref 150–400)
PLATELET # BLD AUTO: 135 K/UL — LOW (ref 150–400)
POTASSIUM SERPL-MCNC: 3.6 MMOL/L — SIGNIFICANT CHANGE UP (ref 3.5–5.3)
POTASSIUM SERPL-SCNC: 3.6 MMOL/L — SIGNIFICANT CHANGE UP (ref 3.5–5.3)
PROT SERPL-MCNC: 6.1 G/DL — SIGNIFICANT CHANGE UP (ref 6–8.3)
PROTHROM AB SERPL-ACNC: 13.7 SEC — HIGH (ref 10–13.1)
RBC # BLD: 4.37 M/UL — SIGNIFICANT CHANGE UP (ref 4.2–5.8)
RBC # FLD: 14.9 % — HIGH (ref 10.3–14.5)
RBC BLD AUTO: NORMAL — SIGNIFICANT CHANGE UP
SODIUM SERPL-SCNC: 139 MMOL/L — SIGNIFICANT CHANGE UP (ref 135–145)
VARIANT LYMPHS # BLD: 2 % — SIGNIFICANT CHANGE UP (ref 0–6)
WBC # BLD: 5.88 K/UL — SIGNIFICANT CHANGE UP (ref 3.8–10.5)
WBC # FLD AUTO: 5.88 K/UL — SIGNIFICANT CHANGE UP (ref 3.8–10.5)

## 2019-07-27 PROCEDURE — 99233 SBSQ HOSP IP/OBS HIGH 50: CPT

## 2019-07-27 PROCEDURE — 93308 TTE F-UP OR LMTD: CPT | Mod: 26

## 2019-07-27 PROCEDURE — 93321 DOPPLER ECHO F-UP/LMTD STD: CPT | Mod: 26

## 2019-07-27 RX ORDER — SODIUM,POTASSIUM PHOSPHATES 278-250MG
1 POWDER IN PACKET (EA) ORAL
Refills: 0 | Status: COMPLETED | OUTPATIENT
Start: 2019-07-27 | End: 2019-07-28

## 2019-07-27 RX ORDER — SODIUM CHLORIDE 0.65 %
1 AEROSOL, SPRAY (ML) NASAL ONCE
Refills: 0 | Status: COMPLETED | OUTPATIENT
Start: 2019-07-27 | End: 2019-07-27

## 2019-07-27 RX ADMIN — ATORVASTATIN CALCIUM 10 MILLIGRAM(S): 80 TABLET, FILM COATED ORAL at 22:47

## 2019-07-27 RX ADMIN — Medication 1 SPRAY(S): at 12:01

## 2019-07-27 RX ADMIN — Medication 1 PACKET(S): at 18:12

## 2019-07-27 RX ADMIN — Medication 0.6 MILLIGRAM(S): at 22:47

## 2019-07-27 RX ADMIN — Medication 0.6 MILLIGRAM(S): at 12:01

## 2019-07-27 RX ADMIN — CARVEDILOL PHOSPHATE 25 MILLIGRAM(S): 80 CAPSULE, EXTENDED RELEASE ORAL at 05:18

## 2019-07-27 RX ADMIN — Medication 81 MILLIGRAM(S): at 12:01

## 2019-07-27 RX ADMIN — PANTOPRAZOLE SODIUM 40 MILLIGRAM(S): 20 TABLET, DELAYED RELEASE ORAL at 05:18

## 2019-07-27 RX ADMIN — CARVEDILOL PHOSPHATE 25 MILLIGRAM(S): 80 CAPSULE, EXTENDED RELEASE ORAL at 18:12

## 2019-07-27 NOTE — PROGRESS NOTE ADULT - PROBLEM SELECTOR PLAN 2
monitor overnight, no leukocytosis on admit, UA wnl  - CXR with pleural effusion postop which may have led to fever; additionally may have fever from pericardial inflammation  - blood culture and urine cx pending  - non-toxic and therefore will monitor off antibiotics  - TTE results pending.   - surgical site w/o purulence

## 2019-07-27 NOTE — PROGRESS NOTE ADULT - PROBLEM SELECTOR PLAN 2
-Now improved s/p initiation of Colchicine.  Continue colchicine BID for now  -No tachycardia or hypoxemia  -TTE done this morning, follow up results to evaluate for pericardial effusion -Now improved s/p initiation of Colchicine.  Continue colchicine BID for now, could continue for 1 week and patient advised to discontinue if diarrhea occurs.   -No tachycardia or hypoxemia  -TTE done this morning, follow up results to evaluate for pericardial effusion

## 2019-07-28 ENCOUNTER — TRANSCRIPTION ENCOUNTER (OUTPATIENT)
Age: 64
End: 2019-07-28

## 2019-07-28 VITALS
HEART RATE: 74 BPM | SYSTOLIC BLOOD PRESSURE: 150 MMHG | OXYGEN SATURATION: 97 % | DIASTOLIC BLOOD PRESSURE: 86 MMHG | RESPIRATION RATE: 18 BRPM

## 2019-07-28 LAB
CULTURE RESULTS: NO GROWTH — SIGNIFICANT CHANGE UP
HCT VFR BLD CALC: 40.7 % — SIGNIFICANT CHANGE UP (ref 39–50)
HGB BLD-MCNC: 14.2 G/DL — SIGNIFICANT CHANGE UP (ref 13–17)
MCHC RBC-ENTMCNC: 31.6 PG — SIGNIFICANT CHANGE UP (ref 27–34)
MCHC RBC-ENTMCNC: 34.8 GM/DL — SIGNIFICANT CHANGE UP (ref 32–36)
MCV RBC AUTO: 90.7 FL — SIGNIFICANT CHANGE UP (ref 80–100)
PLATELET # BLD AUTO: 165 K/UL — SIGNIFICANT CHANGE UP (ref 150–400)
RBC # BLD: 4.49 M/UL — SIGNIFICANT CHANGE UP (ref 4.2–5.8)
RBC # FLD: 13.3 % — SIGNIFICANT CHANGE UP (ref 10.3–14.5)
SPECIMEN SOURCE: SIGNIFICANT CHANGE UP
WBC # BLD: 4.7 K/UL — SIGNIFICANT CHANGE UP (ref 3.8–10.5)
WBC # FLD AUTO: 4.7 K/UL — SIGNIFICANT CHANGE UP (ref 3.8–10.5)

## 2019-07-28 PROCEDURE — 84100 ASSAY OF PHOSPHORUS: CPT

## 2019-07-28 PROCEDURE — 82330 ASSAY OF CALCIUM: CPT

## 2019-07-28 PROCEDURE — 87040 BLOOD CULTURE FOR BACTERIA: CPT

## 2019-07-28 PROCEDURE — 84295 ASSAY OF SERUM SODIUM: CPT

## 2019-07-28 PROCEDURE — 86803 HEPATITIS C AB TEST: CPT

## 2019-07-28 PROCEDURE — 71046 X-RAY EXAM CHEST 2 VIEWS: CPT

## 2019-07-28 PROCEDURE — 85049 AUTOMATED PLATELET COUNT: CPT

## 2019-07-28 PROCEDURE — 93321 DOPPLER ECHO F-UP/LMTD STD: CPT

## 2019-07-28 PROCEDURE — 99232 SBSQ HOSP IP/OBS MODERATE 35: CPT

## 2019-07-28 PROCEDURE — 87086 URINE CULTURE/COLONY COUNT: CPT

## 2019-07-28 PROCEDURE — 83735 ASSAY OF MAGNESIUM: CPT

## 2019-07-28 PROCEDURE — 85014 HEMATOCRIT: CPT

## 2019-07-28 PROCEDURE — 82947 ASSAY GLUCOSE BLOOD QUANT: CPT

## 2019-07-28 PROCEDURE — 85730 THROMBOPLASTIN TIME PARTIAL: CPT

## 2019-07-28 PROCEDURE — 93308 TTE F-UP OR LMTD: CPT

## 2019-07-28 PROCEDURE — 82435 ASSAY OF BLOOD CHLORIDE: CPT

## 2019-07-28 PROCEDURE — 84132 ASSAY OF SERUM POTASSIUM: CPT

## 2019-07-28 PROCEDURE — 99285 EMERGENCY DEPT VISIT HI MDM: CPT | Mod: 25

## 2019-07-28 PROCEDURE — 81001 URINALYSIS AUTO W/SCOPE: CPT

## 2019-07-28 PROCEDURE — 85610 PROTHROMBIN TIME: CPT

## 2019-07-28 PROCEDURE — 80053 COMPREHEN METABOLIC PANEL: CPT

## 2019-07-28 PROCEDURE — 82803 BLOOD GASES ANY COMBINATION: CPT

## 2019-07-28 PROCEDURE — 85027 COMPLETE CBC AUTOMATED: CPT

## 2019-07-28 PROCEDURE — 83605 ASSAY OF LACTIC ACID: CPT

## 2019-07-28 RX ORDER — COLCHICINE 0.6 MG
1 TABLET ORAL
Qty: 14 | Refills: 0
Start: 2019-07-28 | End: 2019-08-03

## 2019-07-28 RX ADMIN — CARVEDILOL PHOSPHATE 25 MILLIGRAM(S): 80 CAPSULE, EXTENDED RELEASE ORAL at 05:32

## 2019-07-28 RX ADMIN — Medication 81 MILLIGRAM(S): at 11:10

## 2019-07-28 RX ADMIN — Medication 1 PACKET(S): at 05:32

## 2019-07-28 RX ADMIN — PANTOPRAZOLE SODIUM 40 MILLIGRAM(S): 20 TABLET, DELAYED RELEASE ORAL at 05:32

## 2019-07-28 RX ADMIN — Medication 0.6 MILLIGRAM(S): at 11:10

## 2019-07-28 NOTE — DISCHARGE NOTE PROVIDER - CARE PROVIDER_API CALL
Jillian Carbone)  Cardiac Electrophysiology; Cardiovascular Disease; Internal Medicine  [address] [place] [address]  Phone: [phone]  Fax: [phone]  Follow Up Time:     Terry Grady)  Cardiology; Internal Medicine  [address] [place] [address], [address]  [place] [address]  Phone: [phone]  Fax: [phone]  Follow Up Time: 1 week

## 2019-07-28 NOTE — DISCHARGE NOTE PROVIDER - CARE PROVIDERS DIRECT ADDRESSES
[email or web address]

## 2019-07-28 NOTE — PROGRESS NOTE ADULT - PROBLEM SELECTOR PLAN 8
IMPROVE VTE Individual Risk Assessment  RISK                                                                Points  [  ] Previous VTE                                                  3  [  ] Thrombophilia                                               2  [  ] Lower limb paralysis                                      2        (unable to hold up >15 seconds)    [  ] Current Cancer                                              2         (within 6 months)  [  ] Immobilization > 24 hrs                                1  [  ] ICU/CCU stay > 24 hours                              1  [  ] Age > 60                                                      1  IMPROVE VTE Score 0, Low risk and not requiring DVT ppx    IMPROVE Score 0-1: Low Risk, No VTE prophylaxis required for most patients, encourage ambulation.   IMPROVE Score 2-3: At risk, pharmacologic VTE prophylaxis is indicated for most patients (in the absence of a contraindication)  IMPROVE Score > or = 4: High Risk, pharmacologic VTE prophylaxis is indicated for most patients (in the absence of a contraindication)
IMPROVE VTE Individual Risk Assessment  RISK                                                                Points  [  ] Previous VTE                                                  3  [  ] Thrombophilia                                               2  [  ] Lower limb paralysis                                      2        (unable to hold up >15 seconds)    [  ] Current Cancer                                              2         (within 6 months)  [  ] Immobilization > 24 hrs                                1  [  ] ICU/CCU stay > 24 hours                              1  [  ] Age > 60                                                      1  IMPROVE VTE Score 0, Low risk and not requiring DVT ppx    IMPROVE Score 0-1: Low Risk, No VTE prophylaxis required for most patients, encourage ambulation.   IMPROVE Score 2-3: At risk, pharmacologic VTE prophylaxis is indicated for most patients (in the absence of a contraindication)  IMPROVE Score > or = 4: High Risk, pharmacologic VTE prophylaxis is indicated for most patients (in the absence of a contraindication)

## 2019-07-28 NOTE — CHART NOTE - NSCHARTNOTEFT_GEN_A_CORE
Patient remained afebrile, WBCs normal, Blood culture - prelim with no growth, with mild discomfort with side laying. EP - Dr. Mota cleared pt for discharge. Patient requesting to be discharged as soon as possible. Request from Dr. Ellsworth to facilitate patient discharge. Discharged on colchicine for 1 week. Please refer to discharge note for detailed hospital course. Patient is currently stable for discharge to home at this time.    Contact # [phone] Patient remained afebrile, WBCs normal, Blood culture - prelim with no growth, no reported complaints. EP - Dr. Mota cleared pt for discharge. Patient requesting to be discharged as soon as possible. Request from Dr. Ellsworth to facilitate patient discharge. Discharged on colchicine for 1 week. Please refer to discharge note for detailed hospital course. Patient is currently stable for discharge to home at this time.    Contact # [phone]

## 2019-07-28 NOTE — DISCHARGE NOTE PROVIDER - NSDCCPCAREPLAN_GEN_ALL_CORE_FT
PRINCIPAL DISCHARGE DIAGNOSIS  Diagnosis: Pacemaker complications, initial encounter  Assessment and Plan of Treatment:       SECONDARY DISCHARGE DIAGNOSES  Diagnosis: Pericardial effusion  Assessment and Plan of Treatment:     Diagnosis: Fever, unspecified fever cause  Assessment and Plan of Treatment: Fever, unspecified fever cause    Diagnosis: Essential hypertension  Assessment and Plan of Treatment: Essential hypertension    Diagnosis: Cardiomyopathy, unspecified type  Assessment and Plan of Treatment: Cardiomyopathy, unspecified type    Diagnosis: HLD (hyperlipidemia)  Assessment and Plan of Treatment: HLD (hyperlipidemia) PRINCIPAL DISCHARGE DIAGNOSIS  Diagnosis: Pacemaker complications, initial encounter  Assessment and Plan of Treatment: Follow up with ZENAIDA Carbone in 1 week      SECONDARY DISCHARGE DIAGNOSES  Diagnosis: Pericardial effusion  Assessment and Plan of Treatment: Resolved  Continue Colchicine for 1 week  Hold for diarrhea  Follow up with Zenaida Carbone    Diagnosis: Fever, unspecified fever cause  Assessment and Plan of Treatment: now resolved  Seek medical help for any fevers, chills, SOB, chest pain, nausea, vomiting    Diagnosis: Essential hypertension  Assessment and Plan of Treatment: Take medications for your blood pressure as recommended.  Eat a heart healthy diet that is low in saturated fats and salt, and includes whole grains, fruits, vegetables and lean protein   Exercise regularly (consult with your physician or cardiologist first); maintain a heart healthy weight.   If you smoke - quit (A resource to help you stop smoking is the [facility] [employer] – phone number [phone].). Continue to follow with your primary physician or cardiologist.   Seek medical help for dizziness, Lightheadedness, Blurry vision, Headache, Chest pain, Shortness of breath  Follow up with your medical doctor to establish long term blood pressure treatment goals.      Diagnosis: Cardiomyopathy, unspecified type  Assessment and Plan of Treatment: Follow up with cardiology and EP as recommended    Diagnosis: HLD (hyperlipidemia)  Assessment and Plan of Treatment: Continue with your cholesterol medications. Eat a heart healthy diet that is low in saturated fats and salt, and includes whole grains, fruits, vegetables and lean protein; exercise regularly (consult with your physician or cardiologist first); maintain a heart healthy weight; if you smoke - quit (A resource to help you stop smoking is the [facility] [employer] – phone number [phone].). Continue to follow with your primary physician or cardiologist.  Seek medical help for dizziness, Lightheadedness, Blurry vision, Headache, Chest pain, Shortness of breath

## 2019-07-28 NOTE — PROGRESS NOTE ADULT - PROBLEM SELECTOR PLAN 1
-Repeat episode of diaphragmatic stimulation with side laying this morning, s/p device interrogation today and LV output lowered to 1.25V from 1.75V.   -continue to monitor patient post device changes
Resolved with EP intervention  - c/w carvedilol, Tele monitoring, Pacing on EKG  - TTE results pending.   - Started on Colchicine last night.
Resolved with EP intervention  - c/w carvedilol, Tele monitoring, Pacing on EKG  - TTE no acute pathology.   - C/w Colchicine for one week on discharge.

## 2019-07-28 NOTE — PROGRESS NOTE ADULT - PROBLEM SELECTOR PLAN 5
continues to have since previous admit  - send blue top  - no signs of active bleeding
continues to have since previous admit  - send blue top  - no signs of active bleeding

## 2019-07-28 NOTE — PROGRESS NOTE ADULT - PROBLEM SELECTOR PLAN 2
- No further fever.  - Current workup negative  - blood culture and urine cx negative  - non-toxic and therefore will monitor off antibiotics  - surgical site w/o purulence

## 2019-07-28 NOTE — PROGRESS NOTE ADULT - PROBLEM SELECTOR PLAN 3
noted on cxr, pending final radiology report. does not look grossly worse than Xray on previous day  - no hypoxia, worsening dyspnea
-Fever x 1 episode 100.8 on ER admission, now afebrile  -No leukocytosis  -Urinalysis negative, CXray without pleural effusions   -Blood cultures/urine culture sent, in process   -Observe off antibiotics for now. Above discussed with Dr. Mota.     SHAHAZD Whitley -[phone]
noted on cxr, pending final radiology report. does not look grossly worse than Xray on previous day  - no hypoxia, worsening dyspnea

## 2019-07-28 NOTE — PROGRESS NOTE ADULT - SUBJECTIVE AND OBJECTIVE BOX
24H hour events: Patient states his pleuritic chest pain is much improved after initiation of colchicine. Asks "When am I going home". Denies c/o CP, palpitations, dizziness or SOB.  Denies further diaphragmatic stimulation over night, however patient had bedside echo this morning and while left side laying he had an episode of diaphragmatic stimulation which has since resolved now that he is laying supine.     MEDICATIONS:  aspirin enteric coated 81 milliGRAM(s) Oral daily  carvedilol 25 milliGRAM(s) Oral every 12 hours    pantoprazole    Tablet 40 milliGRAM(s) Oral before breakfast    atorvastatin 10 milliGRAM(s) Oral at bedtime  colchicine 0.6 milliGRAM(s) Oral every 12 hours    sodium chloride 0.65% Nasal 1 Spray(s) Both Nostrils once      REVIEW OF SYSTEMS:  Complete 10point ROS negative.    PHYSICAL EXAM:  T(C): 37 (07-27-19 @ 10:48), Max: 38.2 (07-26-19 @ 16:09)  HR: 76 (07-27-19 @ 08:02) (76 - 92)  BP: 121/65 (07-27-19 @ 08:02) (117/68 - 156/90)  RR: 18 (07-27-19 @ 08:02) (16 - 18)  SpO2: 95% (07-27-19 @ 08:02) (95% - 98%)    Appearance: Normal	  Cardiovascular: Normal S1 S2, regular. No JVD, No murmurs, No edema  Respiratory: Lungs clear to auscultation	  Psychiatry: A & O x 3, Mood & affect appropriate  Gastrointestinal:  Soft, Non-tender, + BS	  Skin: Left infraclavicular site open to air with Dermabond, no swelling or drainage, left thoracotomy site open to air with Dermabond, no swelling or drainage.   Extremities: Normal range of motion, No clubbing, cyanosis or edema  Vascular: Peripheral pulses palpable 2+ bilaterally        LABS:	 	    CBC Full  -  ( 27 Jul 2019 09:03 )  WBC Count : 5.88 K/uL  Hemoglobin : 13.1 g/dL  Hematocrit : 39.1 %  Platelet Count - Automated : 135 K/uL  Mean Cell Volume : 89.5 fl  Mean Cell Hemoglobin : 30.0 pg  Mean Cell Hemoglobin Concentration : 33.5 gm/dL  Auto Neutrophil # : 3.53 K/uL  Auto Lymphocyte # : 0.88 K/uL  Auto Monocyte # : 1.29 K/uL  Auto Eosinophil # : 0.06 K/uL  Auto Basophil # : 0.00 K/uL  Auto Neutrophil % : 60.0 %  Auto Lymphocyte % : 15.0 %  Auto Monocyte % : 22.0 %  Auto Eosinophil % : 1.0 %  Auto Basophil % : 0.0 %    07-27    139  |  102  |  14  ----------------------------<  138<H>  3.6   |  26  |  1.03  07-26    136  |  97  |  14  ----------------------------<  124<H>  3.6   |  26  |  1.12    Ca    8.8      27 Jul 2019 06:51  Ca    9.1      26 Jul 2019 18:41  Phos  2.2     07-27  Mg     2.2     07-27    TPro  6.1  /  Alb  3.1<L>  /  TBili  0.7  /  DBili  x   /  AST  22  /  ALT  12  /  AlkPhos  37<L>  07-27  TPro  6.9  /  Alb  3.7  /  TBili  0.9  /  DBili  x   /  AST  29  /  ALT  14  /  AlkPhos  42  07-26      TELEMETRY: 	  NSR, Ventricular paced 70's   EKG: Atrial sensed, ventricular paced 88bpm     TTE: pending  CXRAY: Impression:  Clear lungs. No pleural effusions.
Patient is a 64y old  Male who presents with a chief complaint of 64M w/ palpitations (2019 22:56)      SUBJECTIVE / OVERNIGHT EVENTS:  Overall, patient reports improvement with symptoms.  When TTE was being performed, patient felt palpitations.  Now resolved.  No events on tele.   Afebrile.     MEDICATIONS  (STANDING):  aspirin enteric coated 81 milliGRAM(s) Oral daily  atorvastatin 10 milliGRAM(s) Oral at bedtime  carvedilol 25 milliGRAM(s) Oral every 12 hours  colchicine 0.6 milliGRAM(s) Oral every 12 hours  pantoprazole    Tablet 40 milliGRAM(s) Oral before breakfast  sodium chloride 0.65% Nasal 1 Spray(s) Both Nostrils once    MEDICATIONS  (PRN):      CAPILLARY BLOOD GLUCOSE        I&O's Summary      PHYSICAL EXAM:  Vital Signs Last 24 Hrs  T(C): 37 (2019 10:48), Max: 38.2 (2019 16:09)  T(F): 98.6 (2019 10:48), Max: 100.8 (2019 16:09)  HR: 76 (2019 08:02) (76 - 92)  BP: 121/65 (2019 08:02) (117/68 - 156/90)  BP(mean): --  RR: 18 (2019 08:02) (16 - 18)  SpO2: 95% (2019 08:02) (95% - 98%)  GENERAL: NAD, well-developed  HEAD:  Atraumatic, Normocephalic  EYES: EOMI, PERRLA, conjunctiva and sclera clear  NECK: Supple, No JVD  CHEST/LUNG: Clear to auscultation bilaterally; No wheeze  HEART: Regular rate and rhythm; No murmurs, rubs, or gallops  ABDOMEN: Soft, Nontender, Nondistended; Bowel sounds present  EXTREMITIES:  2+ Peripheral Pulses, No clubbing, cyanosis, or edema  PSYCH: AAOx3  NEUROLOGY: non-focal  SKIN: No rashes or lesions    LABS:                        13.1   5.88  )-----------( 135      ( 2019 09:03 )             39.1     07-    139  |  102  |  14  ----------------------------<  138<H>  3.6   |  26  |  1.03    Ca    8.8      2019 06:51  Phos  2.2       Mg     2.2         TPro  6.1  /  Alb  3.1<L>  /  TBili  0.7  /  DBili  x   /  AST  22  /  ALT  12  /  AlkPhos  37<L>      PT/INR - ( 2019 08:28 )   PT: 13.7 sec;   INR: 1.20 ratio         PTT - ( 2019 08:28 )  PTT:26.1 sec      Urinalysis Basic - ( 2019 20:19 )    Color: Light Yellow / Appearance: Clear / S.014 / pH: x  Gluc: x / Ketone: Negative  / Bili: Negative / Urobili: Negative   Blood: x / Protein: Trace / Nitrite: Negative   Leuk Esterase: Negative / RBC: 1 /hpf / WBC 1 /HPF   Sq Epi: x / Non Sq Epi: 0 /hpf / Bacteria: Negative        RADIOLOGY & ADDITIONAL TESTS:    Imaging Personally Reviewed:    Consultant(s) Notes Reviewed:      Care Discussed with Consultants/Other Providers:
Patient is a 64y old  Male who presents with a chief complaint of 64M w/ palpitations (2019 10:49)      SUBJECTIVE / OVERNIGHT EVENTS:  Feels well.  No acute events.  Eager to leave.    MEDICATIONS  (STANDING):  aspirin enteric coated 81 milliGRAM(s) Oral daily  atorvastatin 10 milliGRAM(s) Oral at bedtime  carvedilol 25 milliGRAM(s) Oral every 12 hours  colchicine 0.6 milliGRAM(s) Oral every 12 hours  pantoprazole    Tablet 40 milliGRAM(s) Oral before breakfast    MEDICATIONS  (PRN):      CAPILLARY BLOOD GLUCOSE        I&O's Summary    2019 07:01  -  2019 07:00  --------------------------------------------------------  IN: 240 mL / OUT: 0 mL / NET: 240 mL        PHYSICAL EXAM:  Vital Signs Last 24 Hrs  T(C): 36.9 (2019 11:35), Max: 37.1 (2019 18:10)  T(F): 98.5 (2019 11:35), Max: 98.8 (2019 20:33)  HR: 74 (2019 12:18) (58 - 84)  BP: 150/86 (2019 12:18) (123/71 - 168/86)  BP(mean): --  RR: 18 (2019 12:18) (18 - 18)  SpO2: 97% (2019 12:18) (93% - 98%)  GENERAL: NAD, well-developed  HEAD:  Atraumatic, Normocephalic  EYES: EOMI, PERRLA, conjunctiva and sclera clear  NECK: Supple, No JVD  CHEST/LUNG: Clear to auscultation bilaterally; No wheeze, + PPM  HEART: Regular rate and rhythm; No murmurs, rubs, or gallops  ABDOMEN: Soft, Nontender, Nondistended; Bowel sounds present  EXTREMITIES:  2+ Peripheral Pulses, No clubbing, cyanosis, or edema  PSYCH: AAOx3  NEUROLOGY: non-focal  SKIN: No rashes or lesions    LABS:                        14.2   4.7   )-----------( 165      ( 2019 07:09 )             40.7         139  |  102  |  14  ----------------------------<  138<H>  3.6   |  26  |  1.03    Ca    8.8      2019 06:51  Phos  2.2       Mg     2.2         TPro  6.1  /  Alb  3.1<L>  /  TBili  0.7  /  DBili  x   /  AST  22  /  ALT  12  /  AlkPhos  37<L>      PT/INR - ( 2019 08:28 )   PT: 13.7 sec;   INR: 1.20 ratio         PTT - ( 2019 08:28 )  PTT:26.1 sec      Urinalysis Basic - ( 2019 20:19 )    Color: Light Yellow / Appearance: Clear / S.014 / pH: x  Gluc: x / Ketone: Negative  / Bili: Negative / Urobili: Negative   Blood: x / Protein: Trace / Nitrite: Negative   Leuk Esterase: Negative / RBC: 1 /hpf / WBC 1 /HPF   Sq Epi: x / Non Sq Epi: 0 /hpf / Bacteria: Negative        RADIOLOGY & ADDITIONAL TESTS:    Imaging Personally Reviewed:    Consultant(s) Notes Reviewed:      Care Discussed with Consultants/Other Providers:

## 2019-07-28 NOTE — PROGRESS NOTE ADULT - ASSESSMENT
64 year old M with a history of hypertension, hyperlipidemia, pre-diabetes, and dilated cardiomyopathy, congestive heart failure. He has an underlying left bundle branch block and underwent implantation of a biventricular ICD in 2009 , generator changed in 2015. In follow up the patient was found to have significant atrial lead noise in addition ventricular lead noise/ fractured lead. He underwent a extraction on 7/23/19.  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  Now s/p mini-thoracotomy and epicardial LV lead placement 7/24/19 by Dr. Monson with new [employer] Bi-V ICD.  Patient called EP Clinic 7/26/19 with complaint of intermittent pulsation sensation on left chest, which could be visualized and continued pleuritic chest pain which started post op.  Patient was advised to come in to the ER for evaluation.  In ED found to have low grade temperature of 100.8, s/p ICD vector assessment and reprogramming.
64M w/ dilated cardiomyopathy w/ LBBB s/p BiV-ICD, HTN, HLD, and recent admit for lead fracture s/p extraction (7/23/19) however requiring ICD reimplantation c/b LV lead placement failure secondary coronary sinus dissection w/ small pericardial effusion discharged 7/25/19 no presenting to [facility] for evaluation of palpitations and admitted for monitoring and evaluation of fever.
64M w/ dilated cardiomyopathy w/ LBBB s/p BiV-ICD, HTN, HLD, and recent admit for lead fracture s/p extraction (7/23/19) however requiring ICD reimplantation c/b LV lead placement failure secondary coronary sinus dissection w/ small pericardial effusion discharged 7/25/19 no presenting to [facility] for evaluation of palpitations and admitted for monitoring and evaluation of fever.

## 2019-07-28 NOTE — DISCHARGE NOTE PROVIDER - HOSPITAL COURSE
64 year old M with a history of hypertension, hyperlipidemia, pre-diabetes, and dilated cardiomyopathy, congestive heart failure. He has an underlying left bundle branch block and underwent implantation of a biventricular ICD in 2009 , generator changed in 2015. In follow up the patient was found to have significant atrial lead noise in addition ventricular lead noise/ fractured lead. He underwent a extraction on 7/23/19.  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  Now s/p mini-thoracotomy and epicardial LV lead placement 7/24/19 by Dr. Monson with new [employer] Bi-V ICD.  Patient called EP Clinic 7/26/19 with complaint of intermittent pulsation sensation on left chest, which could be visualized and continued pleuritic chest pain which started post op.  Patient was advised to come in to the ER for evaluation.  In ED found to have low grade temperature of 100.8, s/p ICD vector assessment and reprogramming.    EP consulted and symptoms due to diaphragmatic stimulation by pacemaker. Repeat episode of diaphragmatic stimulation with side laying on 7/27, s/p device interrogation on  and LV output lowered to 1.25V from 1.75V. No further complaints    Pleuritic chest pain improved s/p initiation of Colchicine, to continue for 1 week and patient advised to discontinue if diarrhea occurs.    Patient with febrile illness x 1 episode 100.8 on ER admission, now afebrile    No leukocytosis, Urinalysis negative, CXray without pleural effusions, Blood cultures/urine culture prelim are negative. Observed off antibiotics for now. Above discussed with Dr. Mota. 64 year old M with a history of hypertension, hyperlipidemia, pre-diabetes, and dilated cardiomyopathy, congestive heart failure. He has an underlying left bundle branch block and underwent implantation of a biventricular ICD in 2009 , generator changed in 2015. In follow up the patient was found to have significant atrial lead noise in addition ventricular lead noise/ fractured lead. He underwent a extraction on 7/23/19.  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  Now s/p mini-thoracotomy and epicardial LV lead placement 7/24/19 by Dr. Monson with new [employer] Bi-V ICD.  Patient called EP Clinic 7/26/19 with complaint of intermittent pulsation sensation on left chest, which could be visualized and continued pleuritic chest pain which started post op.  Patient was advised to come in to the ER for evaluation.  In ED found to have low grade temperature of 100.8, s/p ICD vector assessment and reprogramming.    EP consulted and symptoms due to diaphragmatic stimulation by pacemaker. Repeat episode of diaphragmatic stimulation with side laying on 7/27, s/p device interrogation on  and LV output lowered to 1.25V from 1.75V. No further complaints    Pleuritic chest pain improved s/p initiation of Colchicine, to continue for 1 week and patient advised to discontinue if diarrhea occurs. Repeat Echo - Normal pericardium with no pericardial effusion.    Patient with febrile illness x 1 episode 100.8 on ER admission, now afebrile    No leukocytosis, Urinalysis negative, CXray without pleural effusions, Blood cultures/urine culture prelim are negative. Observed off antibiotics for now. Above discussed with Dr. Mota. 64 year old M with a history of hypertension, hyperlipidemia, pre-diabetes, and dilated cardiomyopathy, congestive heart failure. He has an underlying left bundle branch block and underwent implantation of a biventricular ICD in 2009 , generator changed in 2015. In follow up the patient was found to have significant atrial lead noise in addition ventricular lead noise/ fractured lead. He underwent a extraction on 7/23/19.  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  Now s/p mini-thoracotomy and epicardial LV lead placement 7/24/19 by Dr. Monson with new [employer] Bi-V ICD.  Patient called EP Clinic 7/26/19 with complaint of intermittent pulsation sensation on left chest, which could be visualized and continued pleuritic chest pain which started post op.  Patient was advised to come in to the ER for evaluation.  In ED found to have low grade temperature of 100.8, s/p ICD vector assessment and reprogramming.    EP consulted and symptoms due to diaphragmatic stimulation by pacemaker. Repeat episode of diaphragmatic stimulation with side laying on 7/27, s/p device interrogation on  and LV output lowered to 1.25V from 1.75V. No further complaints    Pleuritic chest pain improved s/p initiation of Colchicine, to continue for 1 week and patient advised to discontinue if diarrhea occurs. Repeat Echo - Normal pericardium with no pericardial effusion.    Patient with febrile illness x 1 episode 100.8 on ER admission, now afebrile    No leukocytosis, Urinalysis negative, CXray without pleural effusions, Blood cultures/urine culture prelim are negative. Observed off antibiotics for now. Cleared by Ep for discharge with follow up with PMD in 1 week

## 2019-07-28 NOTE — PROGRESS NOTE ADULT - PROBLEM SELECTOR PLAN 4
Dilated cardiomyopathy  - etiology 2/2 hemochromatosis?  - TTE pending  - c/w carvedilol, statin  - hx of angioedema w/ ace-inhibitor
Dilated cardiomyopathy  - etiology 2/2 hemochromatosis?  - TTE pending  - c/w carvedilol, statin  - hx of angioedema w/ ace-inhibitor

## 2019-07-28 NOTE — DISCHARGE NOTE NURSING/CASE MANAGEMENT/SOCIAL WORK - NSDCDPATPORTLINK_GEN_ALL_CORE
You can access the [facility] Patient Portal, offered by [facility], by registering with the following website: [email or web address]

## 2019-07-31 LAB
CULTURE RESULTS: SIGNIFICANT CHANGE UP
CULTURE RESULTS: SIGNIFICANT CHANGE UP
SPECIMEN SOURCE: SIGNIFICANT CHANGE UP
SPECIMEN SOURCE: SIGNIFICANT CHANGE UP

## 2019-08-08 ENCOUNTER — RX RENEWAL (OUTPATIENT)
Age: 64
End: 2019-08-08

## 2019-08-12 ENCOUNTER — MEDICATION RENEWAL (OUTPATIENT)
Age: 64
End: 2019-08-12

## 2019-08-14 ENCOUNTER — APPOINTMENT (OUTPATIENT)
Dept: ELECTROPHYSIOLOGY | Facility: CLINIC | Age: 64
End: 2019-08-14
Payer: MEDICARE

## 2019-08-14 VITALS
WEIGHT: 173 LBS | OXYGEN SATURATION: 97 % | HEIGHT: 66 IN | HEART RATE: 81 BPM | DIASTOLIC BLOOD PRESSURE: 85 MMHG | SYSTOLIC BLOOD PRESSURE: 127 MMHG | BODY MASS INDEX: 27.8 KG/M2

## 2019-08-14 PROCEDURE — 99024 POSTOP FOLLOW-UP VISIT: CPT

## 2019-08-14 PROCEDURE — 93284 PRGRMG EVAL IMPLANTABLE DFB: CPT

## 2019-08-16 ENCOUNTER — APPOINTMENT (OUTPATIENT)
Dept: ELECTROPHYSIOLOGY | Facility: CLINIC | Age: 64
End: 2019-08-16
Payer: MEDICARE

## 2019-08-16 ENCOUNTER — NON-APPOINTMENT (OUTPATIENT)
Age: 64
End: 2019-08-16

## 2019-08-16 VITALS
SYSTOLIC BLOOD PRESSURE: 125 MMHG | OXYGEN SATURATION: 98 % | WEIGHT: 173 LBS | HEART RATE: 80 BPM | HEIGHT: 66 IN | BODY MASS INDEX: 27.8 KG/M2 | DIASTOLIC BLOOD PRESSURE: 78 MMHG

## 2019-08-16 PROCEDURE — 93289 INTERROG DEVICE EVAL HEART: CPT

## 2019-08-16 PROCEDURE — 93000 ELECTROCARDIOGRAM COMPLETE: CPT | Mod: 59

## 2019-08-16 PROCEDURE — 99213 OFFICE O/P EST LOW 20 MIN: CPT

## 2019-08-16 NOTE — DISCUSSION/SUMMARY
[FreeTextEntry1] : His ICD was checked and shows normal lead impedances. RV and RA thresholds are less than 0.75V/0.5 msec. LV threshold is >3.0/1.0 msec. He has native conduction and I have programmed ventricular pacing off by adding VIP.\par \par He will be admitted next week for ICD lead revision. The expectation is that we will find a Y adaptor insulation breach that is causing his symptoms. He understands the nature of the procedure and wishes to proceed.

## 2019-08-16 NOTE — PHYSICAL EXAM
[General Appearance - Well Developed] : well developed [Normal Appearance] : normal appearance [No Deformities] : no deformities [General Appearance - Well Nourished] : well nourished [Well Groomed] : well groomed [General Appearance - In No Acute Distress] : no acute distress [Normal Conjunctiva] : the conjunctiva exhibited no abnormalities [Eyelids - No Xanthelasma] : the eyelids demonstrated no xanthelasmas [Normal Oral Mucosa] : normal oral mucosa [No Oral Cyanosis] : no oral cyanosis [No Oral Pallor] : no oral pallor [Normal Jugular Venous A Waves Present] : normal jugular venous A waves present [No Jugular Venous Nguyễn A Waves] : no jugular venous nguyễn A waves [Normal Jugular Venous V Waves Present] : normal jugular venous V waves present [Respiration, Rhythm And Depth] : normal respiratory rhythm and effort [Exaggerated Use Of Accessory Muscles For Inspiration] : no accessory muscle use [Abnormal Walk] : normal gait [Auscultation Breath Sounds / Voice Sounds] : lungs were clear to auscultation bilaterally [Gait - Sufficient For Exercise Testing] : the gait was sufficient for exercise testing [Cyanosis, Localized] : no localized cyanosis [Nail Clubbing] : no clubbing of the fingernails [Petechial Hemorrhages (___cm)] : no petechial hemorrhages [] : no rash [Skin Color & Pigmentation] : normal skin color and pigmentation [No Venous Stasis] : no venous stasis [Skin Lesions] : no skin lesions [No Skin Ulcers] : no skin ulcer [No Xanthoma] : no  xanthoma was observed [Oriented To Time, Place, And Person] : oriented to person, place, and time [Affect] : the affect was normal [Mood] : the mood was normal [No Anxiety] : not feeling anxious

## 2019-08-16 NOTE — HISTORY OF PRESENT ILLNESS
[FreeTextEntry1] : 64 year old male with a non ischemic CM and CRT-D in place. His leads were recently extracted and he had epicardial LV lead placed. Two epicardial leads are Y connected to a bipolar connector to the ICD.\par \par He complains of twitching in the pocket and his LV capture threshold has risen to greater than 3.0V/1.0 msec. We checked both ring and tip configuration on the LV connection (i.e. both of the LV unipolar leads) and the twitching remains suggesting a current leak in the pectoral pocket from the Y adaptor or insulation break. \par \par He still feels tired as he is not pacing biventricularly at this time.

## 2019-08-21 ENCOUNTER — APPOINTMENT (OUTPATIENT)
Dept: CARDIOLOGY | Facility: CLINIC | Age: 64
End: 2019-08-21
Payer: MEDICARE

## 2019-08-21 ENCOUNTER — NON-APPOINTMENT (OUTPATIENT)
Age: 64
End: 2019-08-21

## 2019-08-21 VITALS
HEART RATE: 71 BPM | SYSTOLIC BLOOD PRESSURE: 122 MMHG | HEIGHT: 66 IN | BODY MASS INDEX: 27.8 KG/M2 | DIASTOLIC BLOOD PRESSURE: 78 MMHG | OXYGEN SATURATION: 99 % | TEMPERATURE: 98.1 F | WEIGHT: 173 LBS

## 2019-08-21 DIAGNOSIS — R73.03 PREDIABETES.: ICD-10-CM

## 2019-08-21 PROCEDURE — 93000 ELECTROCARDIOGRAM COMPLETE: CPT

## 2019-08-21 PROCEDURE — 99213 OFFICE O/P EST LOW 20 MIN: CPT

## 2019-08-21 PROCEDURE — 93306 TTE W/DOPPLER COMPLETE: CPT

## 2019-08-21 NOTE — PHYSICAL EXAM
[General Appearance - Well Developed] : well developed [Normal Appearance] : normal appearance [Well Groomed] : well groomed [General Appearance - Well Nourished] : well nourished [No Deformities] : no deformities [General Appearance - In No Acute Distress] : no acute distress [Normal Conjunctiva] : the conjunctiva exhibited no abnormalities [Eyelids - No Xanthelasma] : the eyelids demonstrated no xanthelasmas [Normal Oral Mucosa] : normal oral mucosa [No Oral Pallor] : no oral pallor [No Oral Cyanosis] : no oral cyanosis [Normal Jugular Venous A Waves Present] : normal jugular venous A waves present [Normal Jugular Venous V Waves Present] : normal jugular venous V waves present [No Jugular Venous Nguyễn A Waves] : no jugular venous nguyễn A waves [Respiration, Rhythm And Depth] : normal respiratory rhythm and effort [Exaggerated Use Of Accessory Muscles For Inspiration] : no accessory muscle use [Auscultation Breath Sounds / Voice Sounds] : lungs were clear to auscultation bilaterally [Heart Rate And Rhythm] : heart rate and rhythm were normal [Murmurs] : no murmurs present [Heart Sounds] : normal S1 and S2 [Abdomen Tenderness] : non-tender [Abdomen Soft] : soft [Abdomen Mass (___ Cm)] : no abdominal mass palpated [Abnormal Walk] : normal gait [Nail Clubbing] : no clubbing of the fingernails [Gait - Sufficient For Exercise Testing] : the gait was sufficient for exercise testing [Petechial Hemorrhages (___cm)] : no petechial hemorrhages [Cyanosis, Localized] : no localized cyanosis [Skin Color & Pigmentation] : normal skin color and pigmentation [No Venous Stasis] : no venous stasis [] : no rash [No Xanthoma] : no  xanthoma was observed [Skin Lesions] : no skin lesions [No Skin Ulcers] : no skin ulcer [Oriented To Time, Place, And Person] : oriented to person, place, and time [Affect] : the affect was normal [No Anxiety] : not feeling anxious [Mood] : the mood was normal [FreeTextEntry1] : site of aicd intact

## 2019-08-21 NOTE — REASON FOR VISIT
[Follow-Up - Clinic] : a clinic follow-up of [Cardiomyopathy] : cardiomyopathy [Hyperlipidemia] : hyperlipidemia [Hypertension] : hypertension [FreeTextEntry1] : F/U Medical Issues

## 2019-08-21 NOTE — HISTORY OF PRESENT ILLNESS
[FreeTextEntry1] : This is a 64 year old gentlemen with a PMH of HTN, HLD, DM, LBBB, Cardiomyopathy, AICD in place, and Neutropenia presents today for follow up. Patient states that that 3 weeks ago patient had a 6 month check up for his AICD and they noticed that one of the wires of the AICD was disconnected. Patient explains that there were some complications readjusting the wires and patient developed a fever and had "pockets" from the generator a day after he was discharged from the hospital. Patient went back to the Hospital and they continued to manipulate the AICD. Patient was admitted for the second time for three days, and the AICD was left at minimal pulse. Patient states that on follow up on August 16, 2019, they turned off the AICD and patient to go back to the Hospital on Thursday August 22, 2019 for Dr. gomez to perform revision of the AICD. Patient states that he has been feeling fine and has no complaints today. Patient denies dyspnea, palpitations, chest pain, nausea, vomiting, dizziness and lightheadedness.\par

## 2019-08-22 ENCOUNTER — INPATIENT (INPATIENT)
Facility: HOSPITAL | Age: 64
LOS: 0 days | Discharge: HOME | DRG: 261 | End: 2019-08-23
Attending: INTERNAL MEDICINE | Admitting: INTERNAL MEDICINE
Payer: MEDICARE

## 2019-08-22 VITALS
RESPIRATION RATE: 18 BRPM | HEIGHT: 66 IN | WEIGHT: 173.06 LBS | HEART RATE: 69 BPM | OXYGEN SATURATION: 97 % | SYSTOLIC BLOOD PRESSURE: 140 MMHG | DIASTOLIC BLOOD PRESSURE: 75 MMHG | TEMPERATURE: 98 F

## 2019-08-22 DIAGNOSIS — S55.001A UNSPECIFIED INJURY OF ULNAR ARTERY AT FOREARM LEVEL, RIGHT ARM, INITIAL ENCOUNTER: Chronic | ICD-10-CM

## 2019-08-22 DIAGNOSIS — Z95.810 PRESENCE OF AUTOMATIC (IMPLANTABLE) CARDIAC DEFIBRILLATOR: Chronic | ICD-10-CM

## 2019-08-22 DIAGNOSIS — Z98.89 OTHER SPECIFIED POSTPROCEDURAL STATES: Chronic | ICD-10-CM

## 2019-08-22 DIAGNOSIS — T82.111A BREAKDOWN (MECHANICAL) OF CARDIAC PULSE GENERATOR (BATTERY), INITIAL ENCOUNTER: ICD-10-CM

## 2019-08-22 LAB
ALBUMIN SERPL ELPH-MCNC: 4.3 G/DL — SIGNIFICANT CHANGE UP (ref 3.3–5)
ALP SERPL-CCNC: 50 U/L — SIGNIFICANT CHANGE UP (ref 40–120)
ALT FLD-CCNC: 12 U/L — SIGNIFICANT CHANGE UP (ref 10–45)
ANION GAP SERPL CALC-SCNC: 13 MMOL/L — SIGNIFICANT CHANGE UP (ref 5–17)
APTT BLD: 29 SEC — SIGNIFICANT CHANGE UP (ref 27.5–36.3)
AST SERPL-CCNC: 22 U/L — SIGNIFICANT CHANGE UP (ref 10–40)
BILIRUB SERPL-MCNC: 0.6 MG/DL — SIGNIFICANT CHANGE UP (ref 0.2–1.2)
BLD GP AB SCN SERPL QL: NEGATIVE — SIGNIFICANT CHANGE UP
BUN SERPL-MCNC: 19 MG/DL — SIGNIFICANT CHANGE UP (ref 7–23)
CALCIUM SERPL-MCNC: 10 MG/DL — SIGNIFICANT CHANGE UP (ref 8.4–10.5)
CHLORIDE SERPL-SCNC: 101 MMOL/L — SIGNIFICANT CHANGE UP (ref 96–108)
CO2 SERPL-SCNC: 21 MMOL/L — LOW (ref 22–31)
CREAT SERPL-MCNC: 1.08 MG/DL — SIGNIFICANT CHANGE UP (ref 0.5–1.3)
GLUCOSE SERPL-MCNC: 136 MG/DL — HIGH (ref 70–99)
HCT VFR BLD CALC: 46.5 % — SIGNIFICANT CHANGE UP (ref 39–50)
HGB BLD-MCNC: 15.5 G/DL — SIGNIFICANT CHANGE UP (ref 13–17)
INR BLD: 1.06 RATIO — SIGNIFICANT CHANGE UP (ref 0.88–1.16)
MCHC RBC-ENTMCNC: 30.1 PG — SIGNIFICANT CHANGE UP (ref 27–34)
MCHC RBC-ENTMCNC: 33.3 GM/DL — SIGNIFICANT CHANGE UP (ref 32–36)
MCV RBC AUTO: 90.4 FL — SIGNIFICANT CHANGE UP (ref 80–100)
PLATELET # BLD AUTO: 128 K/UL — LOW (ref 150–400)
POTASSIUM SERPL-MCNC: 4.7 MMOL/L — SIGNIFICANT CHANGE UP (ref 3.5–5.3)
POTASSIUM SERPL-SCNC: 4.7 MMOL/L — SIGNIFICANT CHANGE UP (ref 3.5–5.3)
PROT SERPL-MCNC: 7.5 G/DL — SIGNIFICANT CHANGE UP (ref 6–8.3)
PROTHROM AB SERPL-ACNC: 12.2 SEC — SIGNIFICANT CHANGE UP (ref 10–12.9)
RBC # BLD: 5.14 M/UL — SIGNIFICANT CHANGE UP (ref 4.2–5.8)
RBC # FLD: 13.5 % — SIGNIFICANT CHANGE UP (ref 10.3–14.5)
RH IG SCN BLD-IMP: POSITIVE — SIGNIFICANT CHANGE UP
SODIUM SERPL-SCNC: 135 MMOL/L — SIGNIFICANT CHANGE UP (ref 135–145)
WBC # BLD: 3.8 K/UL — SIGNIFICANT CHANGE UP (ref 3.8–10.5)
WBC # FLD AUTO: 3.8 K/UL — SIGNIFICANT CHANGE UP (ref 3.8–10.5)

## 2019-08-22 PROCEDURE — 93010 ELECTROCARDIOGRAM REPORT: CPT | Mod: 76

## 2019-08-22 PROCEDURE — 33226 REPOSITION L VENTRIC LEAD: CPT

## 2019-08-22 PROCEDURE — 93641 EP EVL 1/2CHMB PAC CVDFB TST: CPT | Mod: 26,59

## 2019-08-22 PROCEDURE — 33244 REMOVE ELCTRD TRANSVENOUSLY: CPT

## 2019-08-22 RX ORDER — CARVEDILOL PHOSPHATE 80 MG/1
25 CAPSULE, EXTENDED RELEASE ORAL EVERY 12 HOURS
Refills: 0 | Status: DISCONTINUED | OUTPATIENT
Start: 2019-08-22 | End: 2019-08-23

## 2019-08-22 RX ORDER — CHOLECALCIFEROL (VITAMIN D3) 125 MCG
2000 CAPSULE ORAL DAILY
Refills: 0 | Status: DISCONTINUED | OUTPATIENT
Start: 2019-08-22 | End: 2019-08-23

## 2019-08-22 RX ORDER — CEFAZOLIN SODIUM 1 G
1000 VIAL (EA) INJECTION EVERY 8 HOURS
Refills: 0 | Status: COMPLETED | OUTPATIENT
Start: 2019-08-22 | End: 2019-08-23

## 2019-08-22 RX ORDER — ATORVASTATIN CALCIUM 80 MG/1
20 TABLET, FILM COATED ORAL AT BEDTIME
Refills: 0 | Status: DISCONTINUED | OUTPATIENT
Start: 2019-08-22 | End: 2019-08-23

## 2019-08-22 RX ORDER — PANTOPRAZOLE SODIUM 20 MG/1
40 TABLET, DELAYED RELEASE ORAL
Refills: 0 | Status: DISCONTINUED | OUTPATIENT
Start: 2019-08-22 | End: 2019-08-23

## 2019-08-22 RX ORDER — ASPIRIN/CALCIUM CARB/MAGNESIUM 324 MG
81 TABLET ORAL DAILY
Refills: 0 | Status: DISCONTINUED | OUTPATIENT
Start: 2019-08-22 | End: 2019-08-23

## 2019-08-22 RX ADMIN — Medication 100 MILLIGRAM(S): at 20:24

## 2019-08-22 RX ADMIN — CARVEDILOL PHOSPHATE 25 MILLIGRAM(S): 80 CAPSULE, EXTENDED RELEASE ORAL at 20:25

## 2019-08-22 RX ADMIN — Medication 81 MILLIGRAM(S): at 20:24

## 2019-08-22 RX ADMIN — Medication 2000 UNIT(S): at 20:24

## 2019-08-22 NOTE — CHART NOTE - NSCHARTNOTEFT_GEN_A_CORE
GRACE LO  [id number]    PROCEDURE:  Lead revision          INDICATION:  Muscle twitching with pacing        ELECTROPHYSIOLOGIST(S):  MD Nikita De La Fuente MD        FINDINGS:  - Y-connector exchanged and sleeves sutured. No further muscle stimulation.   - Patient with a lot of oozing during procedure.     COMPLICATIONS:  None      RECOMMENDATIONS:  - CXR AM AP/Lat  - EKG  - Ancef 1g x1 8 hours from now  - Pressure dressing placed. Will re-evaluate tomorrow.

## 2019-08-22 NOTE — H&P CARDIOLOGY - HISTORY OF PRESENT ILLNESS
64M w/ dilated cardiomyopathy w/ LBBB s/p BiV-ICD, HTN, HLD, and recent admit for lead fracture s/p extraction (7/23/19) and had epicardial LV lead placed. Two epicardial leads are Y connected to a bipolar connector to the ICD. He complains of twitching in the pocket and his LV capture threshold has risen to greater than 3.0V/1.0 msec. We checked both ring and tip configuration on the LV connection (i.e. both of the LV unipolar leads) and the twitching remains suggesting a current leak in the pectoral pocket from the Y adaptor or insulation break.   He still feels tired as he is not pacing biventricularly at this time  Pt presents for Lead revision.

## 2019-08-23 ENCOUNTER — TRANSCRIPTION ENCOUNTER (OUTPATIENT)
Age: 64
End: 2019-08-23

## 2019-08-23 VITALS
OXYGEN SATURATION: 96 % | HEART RATE: 98 BPM | DIASTOLIC BLOOD PRESSURE: 66 MMHG | SYSTOLIC BLOOD PRESSURE: 99 MMHG | RESPIRATION RATE: 17 BRPM

## 2019-08-23 LAB
ANION GAP SERPL CALC-SCNC: 8 MMOL/L — SIGNIFICANT CHANGE UP (ref 5–17)
BASOPHILS # BLD AUTO: 0 K/UL — SIGNIFICANT CHANGE UP (ref 0–0.2)
BASOPHILS NFR BLD AUTO: 0.4 % — SIGNIFICANT CHANGE UP (ref 0–2)
BUN SERPL-MCNC: 16 MG/DL — SIGNIFICANT CHANGE UP (ref 7–23)
CALCIUM SERPL-MCNC: 9.3 MG/DL — SIGNIFICANT CHANGE UP (ref 8.4–10.5)
CHLORIDE SERPL-SCNC: 101 MMOL/L — SIGNIFICANT CHANGE UP (ref 96–108)
CO2 SERPL-SCNC: 25 MMOL/L — SIGNIFICANT CHANGE UP (ref 22–31)
CREAT SERPL-MCNC: 1.08 MG/DL — SIGNIFICANT CHANGE UP (ref 0.5–1.3)
EOSINOPHIL # BLD AUTO: 0.1 K/UL — SIGNIFICANT CHANGE UP (ref 0–0.5)
EOSINOPHIL NFR BLD AUTO: 2.2 % — SIGNIFICANT CHANGE UP (ref 0–6)
GLUCOSE SERPL-MCNC: 152 MG/DL — HIGH (ref 70–99)
HCT VFR BLD CALC: 43.7 % — SIGNIFICANT CHANGE UP (ref 39–50)
HGB BLD-MCNC: 14.4 G/DL — SIGNIFICANT CHANGE UP (ref 13–17)
LYMPHOCYTES # BLD AUTO: 0.9 K/UL — LOW (ref 1–3.3)
LYMPHOCYTES # BLD AUTO: 17.2 % — SIGNIFICANT CHANGE UP (ref 13–44)
MCHC RBC-ENTMCNC: 30.6 PG — SIGNIFICANT CHANGE UP (ref 27–34)
MCHC RBC-ENTMCNC: 33.1 GM/DL — SIGNIFICANT CHANGE UP (ref 32–36)
MCV RBC AUTO: 92.5 FL — SIGNIFICANT CHANGE UP (ref 80–100)
MONOCYTES # BLD AUTO: 0.8 K/UL — SIGNIFICANT CHANGE UP (ref 0–0.9)
MONOCYTES NFR BLD AUTO: 13.9 % — SIGNIFICANT CHANGE UP (ref 2–14)
NEUTROPHILS # BLD AUTO: 3.6 K/UL — SIGNIFICANT CHANGE UP (ref 1.8–7.4)
NEUTROPHILS NFR BLD AUTO: 66.4 % — SIGNIFICANT CHANGE UP (ref 43–77)
PLATELET # BLD AUTO: 110 K/UL — LOW (ref 150–400)
POTASSIUM SERPL-MCNC: 4.1 MMOL/L — SIGNIFICANT CHANGE UP (ref 3.5–5.3)
POTASSIUM SERPL-SCNC: 4.1 MMOL/L — SIGNIFICANT CHANGE UP (ref 3.5–5.3)
RBC # BLD: 4.72 M/UL — SIGNIFICANT CHANGE UP (ref 4.2–5.8)
RBC # FLD: 13.6 % — SIGNIFICANT CHANGE UP (ref 10.3–14.5)
SODIUM SERPL-SCNC: 134 MMOL/L — LOW (ref 135–145)
WBC # BLD: 5.4 K/UL — SIGNIFICANT CHANGE UP (ref 3.8–10.5)
WBC # FLD AUTO: 5.4 K/UL — SIGNIFICANT CHANGE UP (ref 3.8–10.5)

## 2019-08-23 PROCEDURE — 93641 EP EVL 1/2CHMB PAC CVDFB TST: CPT | Mod: 59

## 2019-08-23 PROCEDURE — 85027 COMPLETE CBC AUTOMATED: CPT

## 2019-08-23 PROCEDURE — 86901 BLOOD TYPING SEROLOGIC RH(D): CPT

## 2019-08-23 PROCEDURE — 85610 PROTHROMBIN TIME: CPT

## 2019-08-23 PROCEDURE — 93005 ELECTROCARDIOGRAM TRACING: CPT

## 2019-08-23 PROCEDURE — 80048 BASIC METABOLIC PNL TOTAL CA: CPT

## 2019-08-23 PROCEDURE — 33225 L VENTRIC PACING LEAD ADD-ON: CPT

## 2019-08-23 PROCEDURE — 71046 X-RAY EXAM CHEST 2 VIEWS: CPT

## 2019-08-23 PROCEDURE — 86900 BLOOD TYPING SEROLOGIC ABO: CPT

## 2019-08-23 PROCEDURE — 80053 COMPREHEN METABOLIC PANEL: CPT

## 2019-08-23 PROCEDURE — 71046 X-RAY EXAM CHEST 2 VIEWS: CPT | Mod: 26

## 2019-08-23 PROCEDURE — 33244 REMOVE ELCTRD TRANSVENOUSLY: CPT

## 2019-08-23 PROCEDURE — 99238 HOSP IP/OBS DSCHRG MGMT 30/<: CPT

## 2019-08-23 PROCEDURE — C1898: CPT

## 2019-08-23 PROCEDURE — 93010 ELECTROCARDIOGRAM REPORT: CPT

## 2019-08-23 PROCEDURE — C1889: CPT

## 2019-08-23 PROCEDURE — 86850 RBC ANTIBODY SCREEN: CPT

## 2019-08-23 PROCEDURE — 85730 THROMBOPLASTIN TIME PARTIAL: CPT

## 2019-08-23 RX ORDER — ACETAMINOPHEN 500 MG
650 TABLET ORAL EVERY 6 HOURS
Refills: 0 | Status: DISCONTINUED | OUTPATIENT
Start: 2019-08-23 | End: 2019-08-23

## 2019-08-23 RX ORDER — ACETAMINOPHEN 500 MG
2 TABLET ORAL
Qty: 0 | Refills: 0 | DISCHARGE
Start: 2019-08-23

## 2019-08-23 RX ORDER — CEPHALEXIN 500 MG
1 CAPSULE ORAL
Qty: 9 | Refills: 0
Start: 2019-08-23 | End: 2019-08-25

## 2019-08-23 RX ADMIN — Medication 100 MILLIGRAM(S): at 04:52

## 2019-08-23 RX ADMIN — Medication 650 MILLIGRAM(S): at 05:46

## 2019-08-23 RX ADMIN — CARVEDILOL PHOSPHATE 25 MILLIGRAM(S): 80 CAPSULE, EXTENDED RELEASE ORAL at 05:46

## 2019-08-23 NOTE — DISCHARGE NOTE PROVIDER - NSDCCPCAREPLAN_GEN_ALL_CORE_FT
PRINCIPAL DISCHARGE DIAGNOSIS  Diagnosis: Implanted defibrillator electrode lead fracture  Assessment and Plan of Treatment: Continue with follow-up visits to your electrophysiology team and with your home remote device monitoring (if applicable). Continue your medications as prescribed. Monitor your left chest wall site for swelling, bleeding.

## 2019-08-23 NOTE — DISCHARGE NOTE PROVIDER - NSDCCPTREATMENT_GEN_ALL_CORE_FT
PRINCIPAL PROCEDURE  Procedure: Repair, transvenous electrode, permanent pacemaker or ICD, single lead  Findings and Treatment: AICD lead revision

## 2019-08-23 NOTE — DISCHARGE NOTE PROVIDER - NSDCFUADDAPPT_GEN_ALL_CORE_FT
Your follow up Electrophysiology appointments are on Monday, September 9th at 1:00 PM and on Monday, October 7th at 8:05 AM. Your follow up Electrophysiology appointments are on Monday, August 26th 2019 @ 0845 AM for Wound Check.

## 2019-08-23 NOTE — DISCHARGE NOTE PROVIDER - NSDCFUSCHEDAPPT_GEN_ALL_CORE_FT
GRACE LO ; 09/09/2019 ; [facility] [address] GRACE Gomez ; 09/24/2019 ; [facility] [address] [address] ; 10/07/2019 ; [facility] [address]  GRACE LO ; 09/09/2019 ; [facility] [address] GRACE Gomez ; 09/24/2019 ; [facility] [address] ; 10/07/2019 ; [facility] [address]  GRACE LO ; 08/26/2019 ; [facility] Card[facility] [address] GRACE Gomez ; 09/24/2019 ; [facility] [address] GRACE LO ; 08/26/2019 ; [facility] Cardio Electro [address] GRACE Gomez ; 09/24/2019 ; [facility] [address] GRACE LO ; 08/26/2019 ; [facility] Card[facility] Electro [address] GRACE Gomez ; 09/24/2019 ; [facility] Card[facility] [address]

## 2019-08-23 NOTE — DISCHARGE NOTE NURSING/CASE MANAGEMENT/SOCIAL WORK - NSDCFUADDAPPT_GEN_ALL_CORE_FT
Your follow up Electrophysiology appointments are on Monday, August 26th 2019 @ 0845 AM for Wound Check.

## 2019-08-23 NOTE — DISCHARGE NOTE PROVIDER - CARE PROVIDER_API CALL
Corey De La Fuente (MD)  Cardiac Electrophysiology; Cardiovascular Disease; Internal Medicine  [address] [place] [address]  Phone: [phone]  Fax: [phone]  Follow Up Time: Corey De La Fuente)  Cardiac Electrophysiology; Cardiovascular Disease; Internal Medicine  [address] [place] [address]  Phone: [phone]  Fax: [phone]  Follow Up Time:     Terry Grady)  Cardiology; Internal Medicine  [address] [place] [address], [address]  [place] [address]  Phone: [phone]  Fax: [phone]  Follow Up Time:

## 2019-08-23 NOTE — DISCHARGE NOTE PROVIDER - NSDCPNSUBOBJ_GEN_ALL_CORE
Patient is a 64y old  Male who presents with a chief complaint of                 Allergies        adhesives (Other)    adhesives (Rash)    fentanyl (Seizure)    seasonal allergies (Rhinitis)    Seasonal Allergies (Rhinorrhea)    Vasotec (Rash)    Vasotec (Swelling)        Intolerances                Medications:    acetaminophen   Tablet .. 650 milliGRAM(s) Oral every 6 hours PRN    aspirin enteric coated 81 milliGRAM(s) Oral daily    atorvastatin 20 milliGRAM(s) Oral at bedtime    carvedilol 25 milliGRAM(s) Oral every 12 hours    cholecalciferol 2000 Unit(s) Oral daily    pantoprazole    Tablet 40 milliGRAM(s) Oral before breakfast PRN            Vitals:    T(C): 37.2 (19 @ 20:00), Max: 37.2 (19 @ 20:00)    HR: 77 (19 @ 22:00) (58 - 87)    BP: 130/77 (19 @ 22:00) (107/71 - 140/75)    BP(mean): 96 (19 @ 10:42) (96 - 96)    RR: 17 (19 @ 22:00) (17 - 18)    SpO2: 100% (19 @ 22:00) (97% - 100%)    Wt(kg): --    Daily Height in cm: 167.64 (22 Aug 2019 11:04)      Daily Weight in k.5 (22 Aug 2019 11:04)    I&O's Summary        22 Aug 2019 07:01  -  23 Aug 2019 05:12    --------------------------------------------------------    IN: 180 mL / OUT: 0 mL / NET: 180 mL                    Physical Exam:    Appearance: Normal    Eyes: PERRL, EOMI    HENT: Normal oral muscosa, NC/AT    Cardiovascular: S1S2, RRR, No M/R/G, no JVD, No Lower extremity edema    Procedural Access Site: No hematoma, Non-tender to palpation, 2+ pulse, No bruit, No Ecchymosis    Respiratory: Clear to auscultation bilaterally    Gastrointestinal: Soft, Non tender, Normal Bowel Sounds    Musculoskeletal: No clubbing, No joint deformity     Neurologic: Non-focal    Lymphatic: No lymphadenopathy    Psychiatry: AAOx3, Mood & affect appropriate    Skin: No rashes, No ecchymoses, No cyanosis                134<L>  |  101  |  16    ----------------------------<  152<H>    4.1   |  25  |  1.08        Ca    9.3      23 Aug 2019 00:28        TPro  7.5  /  Alb  4.3  /  TBili  0.6  /  DBili  x   /  AST  22  /  ALT  12  /  AlkPhos  50          PT/INR - ( 22 Aug 2019 11:26 )   PT: 12.2 sec;   INR: 1.06 ratio               PTT - ( 22 Aug 2019 11:26 )  PTT:29.0 sec                Lipid panel     Hgb A1c                             14.4     5.4   )-----------( 110      ( 23 Aug 2019 00:28 )               43.7                 ECG: Vpaced 84 bpm        Electrophysiology: AICD lead revision        Imaging:        Interpretation of Telemetry:            AICD LEAD REVISION    Monitor chest wall site for swelling, bleeding    Confirm PA/LAT chest xray results before discharge

## 2019-08-23 NOTE — DISCHARGE NOTE PROVIDER - HOSPITAL COURSE
HPI:    64M w/ dilated cardiomyopathy w/ LBBB s/p BiV-ICD, HTN, HLD, and recent admit for lead fracture s/p extraction (7/23/19) and had epicardial LV lead placed. Two epicardial leads are Y connected to a bipolar connector to the ICD. He complains of twitching in the pocket and his LV capture threshold has risen to greater than 3.0V/1.0 msec. We checked both ring and tip configuration on the LV connection (i.e. both of the LV unipolar leads) and the twitching remains suggesting a current leak in the pectoral pocket from the Y adaptor or insulation break.     He still feels tired as he is not pacing biventricularly at this time    Pt presents for Lead revision. (22 Aug 2019 10:42)        8/22 AICD lead revision. Left chest wall site without bleeding. HPI:    64M w/ dilated cardiomyopathy w/ LBBB s/p BiV-ICD, HTN, HLD, and recent admit for lead fracture s/p extraction (7/23/19) and had epicardial LV lead placed. Two epicardial leads are Y connected to a bipolar connector to the ICD. He complains of twitching in the pocket and his LV capture threshold has risen to greater than 3.0V/1.0 msec. We checked both ring and tip configuration on the LV connection (i.e. both of the LV unipolar leads) and the twitching remains suggesting a current leak in the pectoral pocket from the Y adaptor or insulation break.     He still feels tired as he is not pacing biventricularly at this time    Pt presents for Lead revision. (22 Aug 2019 10:42)        8/22 AICD lead revision. Left chest wall site without bleeding.         < from: Xray Chest 2 Views PA/Lat (08.23.19 @ 06:21) >    Left chest wall dual lead AICD with leads in the right atrium and right ventricle. No pneumothorax.    Pt tolerated procedure well with no post cath complications and hospitalization remained uneventful. Pt stable for discharge home. HPI:    64M w/ dilated cardiomyopathy w/ LBBB s/p BiV-ICD, HTN, HLD, and recent admit for lead fracture s/p extraction (7/23/19) and had epicardial LV lead placed. Two epicardial leads are Y connected to a bipolar connector to the ICD. He complains of twitching in the pocket and his LV capture threshold has risen to greater than 3.0V/1.0 msec. We checked both ring and tip configuration on the LV connection (i.e. both of the LV unipolar leads) and the twitching remains suggesting a current leak in the pectoral pocket from the Y adaptor or insulation break.     He still feels tired as he is not pacing biventricularly at this time    Pt presents for Lead revision. (22 Aug 2019 10:42)        8/22 AICD lead revision. Left chest wall site without bleeding.         < from: Xray Chest 2 Views PA/Lat (08.23.19 @ 06:21) >    Left chest wall dual lead AICD with leads in the right atrium and right ventricle. No pneumothorax.    Pt tolerated procedure well with no post cath complications and hospitalization remained uneventful. Pt stable for discharge home.         Pt had a low grade fever-started on Keflex 250 mg po TID for 3 days. Patient will follow up with Dr Nicole on 8/26/19 @ 0845 hrs for wound check.

## 2019-08-25 LAB
25(OH)D3 SERPL-MCNC: 39.7 NG/ML
ANION GAP SERPL CALC-SCNC: 12 MMOL/L
BASOPHILS # BLD AUTO: 0.02 K/UL
BASOPHILS NFR BLD AUTO: 0.5 %
BUN SERPL-MCNC: 17 MG/DL
CALCIUM SERPL-MCNC: 9.8 MG/DL
CHLORIDE SERPL-SCNC: 104 MMOL/L
CO2 SERPL-SCNC: 24 MMOL/L
CREAT SERPL-MCNC: 1.14 MG/DL
CREAT SPEC-SCNC: 283 MG/DL
EOSINOPHIL # BLD AUTO: 0.18 K/UL
EOSINOPHIL NFR BLD AUTO: 4.4 %
ESTIMATED AVERAGE GLUCOSE: 160 MG/DL
GLUCOSE SERPL-MCNC: 133 MG/DL
HBA1C MFR BLD HPLC: 7.2 %
HCT VFR BLD CALC: 48.8 %
HGB BLD-MCNC: 15.8 G/DL
IMM GRANULOCYTES NFR BLD AUTO: 0.2 %
LYMPHOCYTES # BLD AUTO: 1.48 K/UL
LYMPHOCYTES NFR BLD AUTO: 35.8 %
MAN DIFF?: NORMAL
MCHC RBC-ENTMCNC: 29.9 PG
MCHC RBC-ENTMCNC: 32.4 GM/DL
MCV RBC AUTO: 92.4 FL
MICROALBUMIN 24H UR DL<=1MG/L-MCNC: <1.2 MG/DL
MICROALBUMIN/CREAT 24H UR-RTO: NORMAL MG/G
MONOCYTES # BLD AUTO: 0.8 K/UL
MONOCYTES NFR BLD AUTO: 19.4 %
NEUTROPHILS # BLD AUTO: 1.64 K/UL
NEUTROPHILS NFR BLD AUTO: 39.7 %
PLATELET # BLD AUTO: 141 K/UL
POTASSIUM SERPL-SCNC: 5 MMOL/L
RBC # BLD: 5.28 M/UL
RBC # FLD: 14.8 %
SODIUM SERPL-SCNC: 140 MMOL/L
WBC # FLD AUTO: 4.13 K/UL

## 2019-08-26 ENCOUNTER — APPOINTMENT (OUTPATIENT)
Dept: ELECTROPHYSIOLOGY | Facility: CLINIC | Age: 64
End: 2019-08-26
Payer: MEDICARE

## 2019-08-26 ENCOUNTER — NON-APPOINTMENT (OUTPATIENT)
Age: 64
End: 2019-08-26

## 2019-08-26 VITALS
OXYGEN SATURATION: 97 % | SYSTOLIC BLOOD PRESSURE: 132 MMHG | WEIGHT: 171 LBS | DIASTOLIC BLOOD PRESSURE: 85 MMHG | HEART RATE: 78 BPM | HEIGHT: 66 IN | BODY MASS INDEX: 27.48 KG/M2

## 2019-08-26 PROCEDURE — 93284 PRGRMG EVAL IMPLANTABLE DFB: CPT

## 2019-08-26 PROCEDURE — 99024 POSTOP FOLLOW-UP VISIT: CPT

## 2019-09-03 ENCOUNTER — APPOINTMENT (OUTPATIENT)
Dept: ELECTROPHYSIOLOGY | Facility: CLINIC | Age: 64
End: 2019-09-03
Payer: MEDICARE

## 2019-09-03 VITALS
DIASTOLIC BLOOD PRESSURE: 79 MMHG | WEIGHT: 171 LBS | SYSTOLIC BLOOD PRESSURE: 133 MMHG | HEIGHT: 66 IN | BODY MASS INDEX: 27.48 KG/M2 | HEART RATE: 76 BPM | OXYGEN SATURATION: 95 %

## 2019-09-03 PROCEDURE — 93284 PRGRMG EVAL IMPLANTABLE DFB: CPT

## 2019-09-03 PROCEDURE — 99024 POSTOP FOLLOW-UP VISIT: CPT

## 2019-09-24 ENCOUNTER — NON-APPOINTMENT (OUTPATIENT)
Age: 64
End: 2019-09-24

## 2019-09-24 ENCOUNTER — APPOINTMENT (OUTPATIENT)
Dept: CARDIOLOGY | Facility: CLINIC | Age: 64
End: 2019-09-24
Payer: MEDICARE

## 2019-09-24 VITALS
SYSTOLIC BLOOD PRESSURE: 140 MMHG | OXYGEN SATURATION: 97 % | WEIGHT: 172 LBS | HEART RATE: 63 BPM | HEIGHT: 66 IN | BODY MASS INDEX: 27.64 KG/M2 | TEMPERATURE: 98.5 F | DIASTOLIC BLOOD PRESSURE: 78 MMHG

## 2019-09-24 DIAGNOSIS — Z12.5 ENCOUNTER FOR SCREENING FOR MALIGNANT NEOPLASM OF PROSTATE: ICD-10-CM

## 2019-09-24 PROCEDURE — 99213 OFFICE O/P EST LOW 20 MIN: CPT

## 2019-09-24 PROCEDURE — 93000 ELECTROCARDIOGRAM COMPLETE: CPT

## 2019-09-24 NOTE — HISTORY OF PRESENT ILLNESS
[FreeTextEntry1] : pt presents for f/u s/p aicd revision x 3 for lead displacement pt feels well minimal burning on skin on ocassion .pt s/p recent labs hgba1c 7.25 stable cbc pt denies any chest  pain dizziness ,lightheadedness ,nausea vomiting diaphoresis\par pt s/p flu vaccine .pt requests cialis refill

## 2019-09-24 NOTE — PHYSICAL EXAM
[General Appearance - Well Developed] : well developed [Well Groomed] : well groomed [Normal Appearance] : normal appearance [General Appearance - Well Nourished] : well nourished [No Deformities] : no deformities [General Appearance - In No Acute Distress] : no acute distress [Normal Conjunctiva] : the conjunctiva exhibited no abnormalities [Normal Oral Mucosa] : normal oral mucosa [Eyelids - No Xanthelasma] : the eyelids demonstrated no xanthelasmas [No Oral Pallor] : no oral pallor [No Oral Cyanosis] : no oral cyanosis [Normal Jugular Venous A Waves Present] : normal jugular venous A waves present [No Jugular Venous Nguyễn A Waves] : no jugular venous nguyễn A waves [Normal Jugular Venous V Waves Present] : normal jugular venous V waves present [Respiration, Rhythm And Depth] : normal respiratory rhythm and effort [Exaggerated Use Of Accessory Muscles For Inspiration] : no accessory muscle use [Auscultation Breath Sounds / Voice Sounds] : lungs were clear to auscultation bilaterally [Heart Rate And Rhythm] : heart rate and rhythm were normal [Heart Sounds] : normal S1 and S2 [Murmurs] : no murmurs present [Abdomen Soft] : soft [Abdomen Tenderness] : non-tender [Abdomen Mass (___ Cm)] : no abdominal mass palpated [Abnormal Walk] : normal gait [Gait - Sufficient For Exercise Testing] : the gait was sufficient for exercise testing [Nail Clubbing] : no clubbing of the fingernails [Petechial Hemorrhages (___cm)] : no petechial hemorrhages [Cyanosis, Localized] : no localized cyanosis [Skin Color & Pigmentation] : normal skin color and pigmentation [No Venous Stasis] : no venous stasis [] : no rash [Skin Lesions] : no skin lesions [No Skin Ulcers] : no skin ulcer [No Xanthoma] : no  xanthoma was observed [Oriented To Time, Place, And Person] : oriented to person, place, and time [Affect] : the affect was normal [Mood] : the mood was normal [No Anxiety] : not feeling anxious [FreeTextEntry1] : site of aicd wound intact looks well scar intact

## 2019-09-28 LAB
ALBUMIN SERPL ELPH-MCNC: 4.3 G/DL
ALP BLD-CCNC: 48 U/L
ALT SERPL-CCNC: 14 U/L
AST SERPL-CCNC: 17 U/L
BILIRUB DIRECT SERPL-MCNC: 0.2 MG/DL
BILIRUB INDIRECT SERPL-MCNC: 0.4 MG/DL
BILIRUB SERPL-MCNC: 0.6 MG/DL
CHOLEST SERPL-MCNC: 191 MG/DL
CHOLEST/HDLC SERPL: 2.9 RATIO
CK SERPL-CCNC: 174 U/L
HDLC SERPL-MCNC: 65 MG/DL
LDLC SERPL CALC-MCNC: 106 MG/DL
LDLC SERPL DIRECT ASSAY-MCNC: 115 MG/DL
PROT SERPL-MCNC: 7.1 G/DL
TRIGL SERPL-MCNC: 98 MG/DL

## 2019-10-07 ENCOUNTER — APPOINTMENT (OUTPATIENT)
Dept: ELECTROPHYSIOLOGY | Facility: CLINIC | Age: 64
End: 2019-10-07

## 2019-10-13 PROCEDURE — 84100 ASSAY OF PHOSPHORUS: CPT

## 2019-10-13 PROCEDURE — P9047: CPT

## 2019-10-13 PROCEDURE — 83735 ASSAY OF MAGNESIUM: CPT

## 2019-10-13 PROCEDURE — 85730 THROMBOPLASTIN TIME PARTIAL: CPT

## 2019-10-13 PROCEDURE — 86850 RBC ANTIBODY SCREEN: CPT

## 2019-10-13 PROCEDURE — 93308 TTE F-UP OR LMTD: CPT

## 2019-10-13 PROCEDURE — C1892: CPT

## 2019-10-13 PROCEDURE — 82803 BLOOD GASES ANY COMBINATION: CPT

## 2019-10-13 PROCEDURE — 84295 ASSAY OF SERUM SODIUM: CPT

## 2019-10-13 PROCEDURE — 82947 ASSAY GLUCOSE BLOOD QUANT: CPT

## 2019-10-13 PROCEDURE — 82553 CREATINE MB FRACTION: CPT

## 2019-10-13 PROCEDURE — 84132 ASSAY OF SERUM POTASSIUM: CPT

## 2019-10-13 PROCEDURE — 76000 FLUOROSCOPY <1 HR PHYS/QHP: CPT

## 2019-10-13 PROCEDURE — C2629: CPT

## 2019-10-13 PROCEDURE — 82330 ASSAY OF CALCIUM: CPT

## 2019-10-13 PROCEDURE — C1883: CPT

## 2019-10-13 PROCEDURE — 93005 ELECTROCARDIOGRAM TRACING: CPT

## 2019-10-13 PROCEDURE — C1773: CPT

## 2019-10-13 PROCEDURE — P9016: CPT

## 2019-10-13 PROCEDURE — C1777: CPT

## 2019-10-13 PROCEDURE — 85027 COMPLETE CBC AUTOMATED: CPT

## 2019-10-13 PROCEDURE — C1769: CPT

## 2019-10-13 PROCEDURE — 82435 ASSAY OF BLOOD CHLORIDE: CPT

## 2019-10-13 PROCEDURE — C2628: CPT

## 2019-10-13 PROCEDURE — C1894: CPT

## 2019-10-13 PROCEDURE — 93321 DOPPLER ECHO F-UP/LMTD STD: CPT

## 2019-10-13 PROCEDURE — 80053 COMPREHEN METABOLIC PANEL: CPT

## 2019-10-13 PROCEDURE — C1889: CPT

## 2019-10-13 PROCEDURE — C1898: CPT

## 2019-10-13 PROCEDURE — 82550 ASSAY OF CK (CPK): CPT

## 2019-10-13 PROCEDURE — C1882: CPT

## 2019-10-13 PROCEDURE — 86923 COMPATIBILITY TEST ELECTRIC: CPT

## 2019-10-13 PROCEDURE — 86901 BLOOD TYPING SEROLOGIC RH(D): CPT

## 2019-10-13 PROCEDURE — 85014 HEMATOCRIT: CPT

## 2019-10-13 PROCEDURE — 85610 PROTHROMBIN TIME: CPT

## 2019-10-13 PROCEDURE — C1887: CPT

## 2019-10-13 PROCEDURE — 71045 X-RAY EXAM CHEST 1 VIEW: CPT

## 2019-10-13 PROCEDURE — C1900: CPT

## 2019-10-13 PROCEDURE — 86900 BLOOD TYPING SEROLOGIC ABO: CPT

## 2019-10-13 PROCEDURE — 84484 ASSAY OF TROPONIN QUANT: CPT

## 2019-10-13 PROCEDURE — 83605 ASSAY OF LACTIC ACID: CPT

## 2019-10-13 PROCEDURE — 85384 FIBRINOGEN ACTIVITY: CPT

## 2019-11-25 ENCOUNTER — APPOINTMENT (OUTPATIENT)
Dept: ORTHOPEDIC SURGERY | Facility: CLINIC | Age: 64
End: 2019-11-25
Payer: OTHER MISCELLANEOUS

## 2019-11-25 VITALS
WEIGHT: 172 LBS | HEIGHT: 66 IN | HEART RATE: 77 BPM | BODY MASS INDEX: 27.64 KG/M2 | DIASTOLIC BLOOD PRESSURE: 93 MMHG | SYSTOLIC BLOOD PRESSURE: 159 MMHG

## 2019-11-25 PROCEDURE — 99214 OFFICE O/P EST MOD 30 MIN: CPT

## 2019-11-25 PROCEDURE — 72100 X-RAY EXAM L-S SPINE 2/3 VWS: CPT

## 2019-11-25 NOTE — PHYSICAL EXAM
[] : Motor: [Normal] : normal [NL] : Motor strength of the left lower extremity was normal [LE] : Sensory: Intact in bilateral lower extremities [___/5] : right ([unfilled]/5) [Motor Strength Lower Extremities] : right (5/5) [2+] : right ankle jerk 2+ [1+] : right patella 1+ [DP] : dorsalis pedis 2+ and symmetric bilaterally [PT] : posterior tibial 2+ and symmetric bilaterally [SLR] : positive straight leg raise [Poor Appearance] : well-appearing [Acute Distress] : not in acute distress [Obese] : not obese [Abl Mood] : in a normal mood [Abl Affect] : with normal affect [Poor Coordination] : normal coordination [Disorientation] : oriented x 3 [Plantar Reflex Right Only] : absent on the right [Plantar Reflex Left Only] : absent on the left [DTR Reflexes Clonus Of Right Ankle (___ Beats)] : absent on the right [DTR Reflexes Clonus Of Left Ankle (___ Beats)] : absent on the left [FreeTextEntry2] : The pt is awake, alert and oriented to self, place and time, is comfortable but in no acute distress. Gait examination reveals a narrow based, non-ataxic, non-antalgic gait. Can heel and toe walk without difficulty. Inspection of neck, back and lower extremities bilaterally reveals no rashes or ecchymotic lesions.  There is no obvious abnormal spinal curvature in the sagittal and coronal planes. There is tenderness over the lumbar spine in the midline as well as in the right paraspinal musculature more than the left. There is no sacroiliac tenderness. No greater trochanteric tenderness bilaterally. No atrophy or abnormal movements noted in the upper or lower extremities. There is no swelling noted in the upper or lower extremities bilaterally. No cervical lymphadenopathy noted anteriorly.\par Lumbar spine range of motion is restricted with forward flexion of 40° and extension of 25° with extension more painful than flexion.  \par Negative straight leg raise to 45° in the supine position bilaterally with primarily low back pain noted. There is no groin pain with hip internal rotation and a negative JAMIL test bilaterally.  [de-identified] : well-healed lumbar midline incision from prior surgery [de-identified] : AP and lateral image of the lumbar spine obtained today is compared with previous x-rays obtained on October 23, 2015. No significant scoliosis is noted at this time. Some straightening of the lumbar lordosis identified which is unchanged. Increased bridging is seen at the L4-5 level progression of ossification of the anterior osteophytes. Degenerative changes are seen in the disc space as well without significant loss of disc height. No new fractures are identified. No additional degeneration seen besides at the L4-5 level.

## 2019-11-25 NOTE — DISCUSSION/SUMMARY
[Medication Risks Reviewed] : Medication risks reviewed [de-identified] : At this time recommended physical therapy for the patient.  He will follow-up with me in 3 months.  Voltaren gel was prescribed today as well.  He has stable findings on x-ray.\par \par The patient was educated regarding their condition, treatment options as well as prescribed course of treatment. \par Risks and benefits as well as alternatives to the proposed treatment were also provided to the patient \par They were given the opportunity to have all their questions answered to their satisfaction.\par \par Vital signs were reviewed with the patient and the patient was instructed to followup with their primary care provider for further management.\par \par Healthy lifestyle recommendations were also made including a tobacco free lifestyle, proper diet, and weight control.

## 2019-11-25 NOTE — HISTORY OF PRESENT ILLNESS
[9] : a maximum pain level of 9/10 [Stable] : stable [Prolonged Sitting] : worsened by prolonged sitting [7] : a current pain level of 7/10 [Walking] : worsened by walking [Prolonged Standing] : worsened by prolonged standing [de-identified] : Patient here today complaining of low back pain WC DOI 9/29/1998 not working, patient reports  radiates to left side of back ad left leg and right leg at times with alternating numbness and tingling on bilateral lower extremities, patient states that he does own stretching exercises at home with some relief. Applies Diclofenac gel with some relief.\par Does own home exercises.\par Has primarily left low back and left leg pain [de-identified] : diclofenac

## 2019-12-03 ENCOUNTER — APPOINTMENT (OUTPATIENT)
Dept: ELECTROPHYSIOLOGY | Facility: CLINIC | Age: 64
End: 2019-12-03
Payer: MEDICARE

## 2019-12-03 ENCOUNTER — APPOINTMENT (OUTPATIENT)
Dept: OTOLARYNGOLOGY | Facility: CLINIC | Age: 64
End: 2019-12-03
Payer: MEDICARE

## 2019-12-03 VITALS
WEIGHT: 173 LBS | HEIGHT: 66 IN | DIASTOLIC BLOOD PRESSURE: 84 MMHG | SYSTOLIC BLOOD PRESSURE: 137 MMHG | HEART RATE: 75 BPM | BODY MASS INDEX: 27.8 KG/M2

## 2019-12-03 VITALS
BODY MASS INDEX: 27.8 KG/M2 | WEIGHT: 173 LBS | HEIGHT: 66 IN | DIASTOLIC BLOOD PRESSURE: 84 MMHG | HEART RATE: 78 BPM | SYSTOLIC BLOOD PRESSURE: 133 MMHG | OXYGEN SATURATION: 96 %

## 2019-12-03 DIAGNOSIS — H69.81 OTHER SPECIFIED DISORDERS OF EUSTACHIAN TUBE, RIGHT EAR: ICD-10-CM

## 2019-12-03 DIAGNOSIS — H93.A1 PULSATILE TINNITUS, RIGHT EAR: ICD-10-CM

## 2019-12-03 PROCEDURE — 99214 OFFICE O/P EST MOD 30 MIN: CPT

## 2019-12-03 PROCEDURE — 93284 PRGRMG EVAL IMPLANTABLE DFB: CPT

## 2019-12-03 NOTE — HISTORY OF PRESENT ILLNESS
[de-identified] : PAtient has had a tickling in the right ear for the last few weeks. He does not clean his ears at home. He is not having any issues with ringing in the ears or dizziness. He does not have any curernt issues with nasal congestion or runny nose. He feels like the tickling might be wax but he is not sure. The tickling comes with a ticking and clicking noise in the right ear on occasion that is minor but annoying.

## 2019-12-03 NOTE — ASSESSMENT
[FreeTextEntry1] : Patient complaining of the tic and her his right ear up which can't awake in examination is used to perfectly normal he just recently had his pacemaker replaced had some difficulty had to go back 3 times otherwise is doing really well no further acute ENT intervention is indicated at this time.

## 2020-01-05 ENCOUNTER — RX RENEWAL (OUTPATIENT)
Age: 65
End: 2020-01-05

## 2020-02-06 ENCOUNTER — APPOINTMENT (OUTPATIENT)
Dept: SURGERY | Facility: CLINIC | Age: 65
End: 2020-02-06
Payer: MEDICARE

## 2020-02-06 VITALS
WEIGHT: 174 LBS | SYSTOLIC BLOOD PRESSURE: 142 MMHG | BODY MASS INDEX: 27.97 KG/M2 | RESPIRATION RATE: 15 BRPM | HEIGHT: 66 IN | HEART RATE: 77 BPM | TEMPERATURE: 98.5 F | DIASTOLIC BLOOD PRESSURE: 86 MMHG | OXYGEN SATURATION: 96 %

## 2020-02-06 DIAGNOSIS — K64.5 PERIANAL VENOUS THROMBOSIS: ICD-10-CM

## 2020-02-06 PROCEDURE — 46600 DIAGNOSTIC ANOSCOPY SPX: CPT

## 2020-02-06 PROCEDURE — 99213 OFFICE O/P EST LOW 20 MIN: CPT | Mod: 25

## 2020-02-06 NOTE — PHYSICAL EXAM
[Normal Breath Sounds] : Normal breath sounds [Normal Heart Sounds] : normal heart sounds [No Rash or Lesion] : No rash or lesion [Normal Rate and Rhythm] : normal rate and rhythm [Alert] : alert [Oriented to Person] : oriented to person [Oriented to Place] : oriented to place [Oriented to Time] : oriented to time [Calm] : calm [Abdomen Masses] : No abdominal masses [Abdomen Tenderness] : ~T No ~M abdominal tenderness [JVD] : no jugular venous distention  [de-identified] : Well nourished male, in no apparent distress [de-identified] : soft, nondistended. +BS [de-identified] : Full ROM [de-identified] : WNL [FreeTextEntry1] : Perianal inspection demonstrated a 1 cm right anterior external hemorrhoid thrombosis. There was no tenderness on digital exam. Anoscopy demonstrated mild hemorrhoid enlargement or

## 2020-02-06 NOTE — HISTORY OF PRESENT ILLNESS
[FreeTextEntry1] : Yash is a 64 y/o male here for evaluation of rectal discomfort. PMH of cardiomyopathy s/p placement of AICD. On ASA 81 mg. Reports feeling a lump near rectum/ swelling for the past week. Patient used suppositories that have relieved the pain and swelling. Denies rectal bleeding. Has normal formed BM daily. PMH of multiple RBL procedures. Colonoscopy from 1/21/16 demonstrated small non-bleeding hemorrhoids. The examination was otherwise normal.

## 2020-02-11 ENCOUNTER — APPOINTMENT (OUTPATIENT)
Dept: CARDIOLOGY | Facility: CLINIC | Age: 65
End: 2020-02-11
Payer: MEDICARE

## 2020-02-11 ENCOUNTER — NON-APPOINTMENT (OUTPATIENT)
Age: 65
End: 2020-02-11

## 2020-02-11 VITALS
HEIGHT: 66 IN | DIASTOLIC BLOOD PRESSURE: 80 MMHG | OXYGEN SATURATION: 97 % | WEIGHT: 175 LBS | TEMPERATURE: 98.4 F | SYSTOLIC BLOOD PRESSURE: 132 MMHG | BODY MASS INDEX: 28.12 KG/M2 | HEART RATE: 61 BPM

## 2020-02-11 PROCEDURE — 93000 ELECTROCARDIOGRAM COMPLETE: CPT

## 2020-02-11 PROCEDURE — 99214 OFFICE O/P EST MOD 30 MIN: CPT

## 2020-02-11 NOTE — PHYSICAL EXAM
[General Appearance - Well Developed] : well developed [Well Groomed] : well groomed [Normal Appearance] : normal appearance [General Appearance - Well Nourished] : well nourished [No Deformities] : no deformities [General Appearance - In No Acute Distress] : no acute distress [Normal Conjunctiva] : the conjunctiva exhibited no abnormalities [Eyelids - No Xanthelasma] : the eyelids demonstrated no xanthelasmas [Normal Oral Mucosa] : normal oral mucosa [No Oral Pallor] : no oral pallor [No Oral Cyanosis] : no oral cyanosis [Normal Jugular Venous A Waves Present] : normal jugular venous A waves present [Normal Jugular Venous V Waves Present] : normal jugular venous V waves present [No Jugular Venous Nguyễn A Waves] : no jugular venous nguyễn A waves [Respiration, Rhythm And Depth] : normal respiratory rhythm and effort [Exaggerated Use Of Accessory Muscles For Inspiration] : no accessory muscle use [Auscultation Breath Sounds / Voice Sounds] : lungs were clear to auscultation bilaterally [Heart Sounds] : normal S1 and S2 [Heart Rate And Rhythm] : heart rate and rhythm were normal [Abdomen Soft] : soft [Murmurs] : no murmurs present [Abdomen Tenderness] : non-tender [Abdomen Mass (___ Cm)] : no abdominal mass palpated [Gait - Sufficient For Exercise Testing] : the gait was sufficient for exercise testing [Abnormal Walk] : normal gait [Cyanosis, Localized] : no localized cyanosis [Nail Clubbing] : no clubbing of the fingernails [Petechial Hemorrhages (___cm)] : no petechial hemorrhages [] : no rash [Skin Color & Pigmentation] : normal skin color and pigmentation [Skin Lesions] : no skin lesions [No Venous Stasis] : no venous stasis [No Skin Ulcers] : no skin ulcer [No Xanthoma] : no  xanthoma was observed [Mood] : the mood was normal [Affect] : the affect was normal [Oriented To Time, Place, And Person] : oriented to person, place, and time [No Anxiety] : not feeling anxious [FreeTextEntry1] : site of aicd intact

## 2020-02-11 NOTE — HISTORY OF PRESENT ILLNESS
[FreeTextEntry1] : Yash is a 66yo male who presents for routine follow-up. He has PMH of cardiomyopathy, hyperlipidemia, hypertension, AICD, DM, and Neutropenia. Patient reports that he had some rectal pain a few weeks ago and saw Dr. Jonas. He was told to use OTC hemorrhoid medication and exam was WNL. Otherwise patient doing well, has no issues or complaints for today. Patient also reports he saw Dr. Castillo today.  Closure 3 Information: This tab is for additional flaps and grafts above and beyond our usual structured repairs.  Please note if you enter information here it will not currently bill and you will need to add the billing information manually.

## 2020-02-17 ENCOUNTER — RX RENEWAL (OUTPATIENT)
Age: 65
End: 2020-02-17

## 2020-02-17 LAB
25(OH)D3 SERPL-MCNC: 39.1 NG/ML
ALBUMIN SERPL ELPH-MCNC: 4.8 G/DL
ALP BLD-CCNC: 54 U/L
ALT SERPL-CCNC: 38 U/L
ANION GAP SERPL CALC-SCNC: 14 MMOL/L
APPEARANCE: CLEAR
AST SERPL-CCNC: 34 U/L
BACTERIA: NEGATIVE
BASOPHILS # BLD AUTO: 0.01 K/UL
BASOPHILS NFR BLD AUTO: 0.3 %
BILIRUB SERPL-MCNC: 0.5 MG/DL
BILIRUBIN URINE: NEGATIVE
BLOOD URINE: NEGATIVE
BUN SERPL-MCNC: 18 MG/DL
CALCIUM SERPL-MCNC: 10.1 MG/DL
CHLORIDE SERPL-SCNC: 103 MMOL/L
CHOLEST SERPL-MCNC: 279 MG/DL
CHOLEST/HDLC SERPL: 3.4 RATIO
CK SERPL-CCNC: 188 U/L
CO2 SERPL-SCNC: 24 MMOL/L
COLOR: NORMAL
CREAT SERPL-MCNC: 0.97 MG/DL
EOSINOPHIL # BLD AUTO: 0.05 K/UL
EOSINOPHIL NFR BLD AUTO: 1.7 %
ESTIMATED AVERAGE GLUCOSE: 171 MG/DL
FERRITIN SERPL-MCNC: 24 NG/ML
FOLATE SERPL-MCNC: 11.2 NG/ML
FOLATE SERPL-MCNC: 12.5 NG/ML
GLUCOSE QUALITATIVE U: NORMAL
GLUCOSE SERPL-MCNC: 138 MG/DL
HAPTOGLOB SERPL-MCNC: 91 MG/DL
HBA1C MFR BLD HPLC: 7.6 %
HCT VFR BLD CALC: 48.4 %
HDLC SERPL-MCNC: 83 MG/DL
HGB BLD-MCNC: 15.9 G/DL
HYALINE CASTS: 0 /LPF
IMM GRANULOCYTES NFR BLD AUTO: 0 %
IRON SERPL-MCNC: 114 UG/DL
KETONES URINE: NEGATIVE
LDH SERPL-CCNC: 217 U/L
LDLC SERPL CALC-MCNC: 177 MG/DL
LDLC SERPL DIRECT ASSAY-MCNC: 187 MG/DL
LEUKOCYTE ESTERASE URINE: NEGATIVE
LYMPHOCYTES # BLD AUTO: 1.09 K/UL
LYMPHOCYTES NFR BLD AUTO: 36.6 %
MAN DIFF?: NORMAL
MCHC RBC-ENTMCNC: 28.2 PG
MCHC RBC-ENTMCNC: 32.9 GM/DL
MCV RBC AUTO: 86 FL
MICROSCOPIC-UA: NORMAL
MONOCYTES # BLD AUTO: 0.47 K/UL
MONOCYTES NFR BLD AUTO: 15.8 %
NEUTROPHILS # BLD AUTO: 1.36 K/UL
NEUTROPHILS NFR BLD AUTO: 45.6 %
NITRITE URINE: NEGATIVE
PH URINE: 6
PLATELET # BLD AUTO: 178 K/UL
POTASSIUM SERPL-SCNC: 4.7 MMOL/L
PROT SERPL-MCNC: 7.4 G/DL
PROTEIN URINE: NORMAL
PSA SERPL-MCNC: 3.62 NG/ML
RBC # BLD: 5.63 M/UL
RBC # FLD: 18.2 %
RED BLOOD CELLS URINE: 1 /HPF
SODIUM SERPL-SCNC: 141 MMOL/L
SPECIFIC GRAVITY URINE: 1.03
SQUAMOUS EPITHELIAL CELLS: 1 /HPF
T4 FREE SERPL-MCNC: 1.1 NG/DL
TRANSFERRIN SERPL-MCNC: 379 MG/DL
TRIGL SERPL-MCNC: 98 MG/DL
TSH SERPL-ACNC: 1.98 UIU/ML
UROBILINOGEN URINE: NORMAL
VIT B12 SERPL-MCNC: 398 PG/ML
VIT B12 SERPL-MCNC: 399 PG/ML
WBC # FLD AUTO: 2.98 K/UL
WHITE BLOOD CELLS URINE: 1 /HPF

## 2020-03-04 ENCOUNTER — APPOINTMENT (OUTPATIENT)
Dept: ELECTROPHYSIOLOGY | Facility: CLINIC | Age: 65
End: 2020-03-04
Payer: MEDICARE

## 2020-03-04 PROCEDURE — 93296 REM INTERROG EVL PM/IDS: CPT

## 2020-03-04 PROCEDURE — 93295 DEV INTERROG REMOTE 1/2/MLT: CPT

## 2020-03-12 ENCOUNTER — RX RENEWAL (OUTPATIENT)
Age: 65
End: 2020-03-12

## 2020-03-13 ENCOUNTER — RX RENEWAL (OUTPATIENT)
Age: 65
End: 2020-03-13

## 2020-03-13 RX ORDER — TADALAFIL 20 MG/1
20 TABLET ORAL
Qty: 6 | Refills: 0 | Status: DISCONTINUED | COMMUNITY
Start: 2017-04-08 | End: 2020-03-13

## 2020-03-16 ENCOUNTER — TRANSCRIPTION ENCOUNTER (OUTPATIENT)
Age: 65
End: 2020-03-16

## 2020-04-03 ENCOUNTER — RX RENEWAL (OUTPATIENT)
Age: 65
End: 2020-04-03

## 2020-06-03 ENCOUNTER — APPOINTMENT (OUTPATIENT)
Dept: ELECTROPHYSIOLOGY | Facility: CLINIC | Age: 65
End: 2020-06-03

## 2020-06-03 PROCEDURE — 93296 REM INTERROG EVL PM/IDS: CPT

## 2020-06-03 PROCEDURE — 93295 DEV INTERROG REMOTE 1/2/MLT: CPT

## 2020-08-25 ENCOUNTER — NON-APPOINTMENT (OUTPATIENT)
Age: 65
End: 2020-08-25

## 2020-08-25 ENCOUNTER — LABORATORY RESULT (OUTPATIENT)
Age: 65
End: 2020-08-25

## 2020-08-25 ENCOUNTER — APPOINTMENT (OUTPATIENT)
Dept: CARDIOLOGY | Facility: CLINIC | Age: 65
End: 2020-08-25
Payer: MEDICARE

## 2020-08-25 VITALS
DIASTOLIC BLOOD PRESSURE: 58 MMHG | TEMPERATURE: 97.5 F | HEIGHT: 66 IN | SYSTOLIC BLOOD PRESSURE: 120 MMHG | OXYGEN SATURATION: 97 % | HEART RATE: 88 BPM | WEIGHT: 176 LBS | BODY MASS INDEX: 28.28 KG/M2

## 2020-08-25 DIAGNOSIS — N52.9 MALE ERECTILE DYSFUNCTION, UNSPECIFIED: ICD-10-CM

## 2020-08-25 PROCEDURE — 93000 ELECTROCARDIOGRAM COMPLETE: CPT

## 2020-08-25 PROCEDURE — 99213 OFFICE O/P EST LOW 20 MIN: CPT

## 2020-08-25 RX ORDER — PITAVASTATIN CALCIUM 4.18 MG/1
4 TABLET, FILM COATED ORAL
Qty: 90 | Refills: 2 | Status: DISCONTINUED | COMMUNITY
End: 2020-08-25

## 2020-08-25 NOTE — PHYSICAL EXAM
[General Appearance - Well Developed] : well developed [Normal Appearance] : normal appearance [Well Groomed] : well groomed [General Appearance - Well Nourished] : well nourished [No Deformities] : no deformities [General Appearance - In No Acute Distress] : no acute distress [Normal Conjunctiva] : the conjunctiva exhibited no abnormalities [Eyelids - No Xanthelasma] : the eyelids demonstrated no xanthelasmas [Normal Oral Mucosa] : normal oral mucosa [No Oral Pallor] : no oral pallor [No Oral Cyanosis] : no oral cyanosis [Normal Jugular Venous A Waves Present] : normal jugular venous A waves present [No Jugular Venous Nguyễn A Waves] : no jugular venous nguyễn A waves [Normal Jugular Venous V Waves Present] : normal jugular venous V waves present [Respiration, Rhythm And Depth] : normal respiratory rhythm and effort [Auscultation Breath Sounds / Voice Sounds] : lungs were clear to auscultation bilaterally [Exaggerated Use Of Accessory Muscles For Inspiration] : no accessory muscle use [Heart Rate And Rhythm] : heart rate and rhythm were normal [Heart Sounds] : normal S1 and S2 [Murmurs] : no murmurs present [Abdomen Tenderness] : non-tender [Abdomen Soft] : soft [Abdomen Mass (___ Cm)] : no abdominal mass palpated [Gait - Sufficient For Exercise Testing] : the gait was sufficient for exercise testing [Abnormal Walk] : normal gait [Petechial Hemorrhages (___cm)] : no petechial hemorrhages [Nail Clubbing] : no clubbing of the fingernails [Cyanosis, Localized] : no localized cyanosis [Skin Color & Pigmentation] : normal skin color and pigmentation [No Venous Stasis] : no venous stasis [] : no rash [Skin Lesions] : no skin lesions [No Skin Ulcers] : no skin ulcer [No Xanthoma] : no  xanthoma was observed [Affect] : the affect was normal [Oriented To Time, Place, And Person] : oriented to person, place, and time [No Anxiety] : not feeling anxious [Mood] : the mood was normal

## 2020-08-25 NOTE — HISTORY OF PRESENT ILLNESS
[FreeTextEntry1] : This is a 65 year old gentlemen with a PMH of cardiomyopathy, hyperlipidemia, hypertension, AICD, DM, and Neutropenia presents today for follow up. Patient was positive with COVID in March, no residual symptoms at this time.  Patient denies dyspnea, palpitations, chest pain, nausea, vomiting, dizziness and lightheadedness.\par

## 2020-09-03 ENCOUNTER — APPOINTMENT (OUTPATIENT)
Dept: ELECTROPHYSIOLOGY | Facility: CLINIC | Age: 65
End: 2020-09-03

## 2020-09-08 ENCOUNTER — APPOINTMENT (OUTPATIENT)
Dept: ELECTROPHYSIOLOGY | Facility: CLINIC | Age: 65
End: 2020-09-08
Payer: MEDICARE

## 2020-09-08 PROCEDURE — 93295 DEV INTERROG REMOTE 1/2/MLT: CPT

## 2020-09-08 PROCEDURE — 93296 REM INTERROG EVL PM/IDS: CPT

## 2020-09-09 ENCOUNTER — APPOINTMENT (OUTPATIENT)
Dept: CARDIOLOGY | Facility: CLINIC | Age: 65
End: 2020-09-09
Payer: MEDICARE

## 2020-09-09 PROCEDURE — 93306 TTE W/DOPPLER COMPLETE: CPT

## 2020-09-14 ENCOUNTER — APPOINTMENT (OUTPATIENT)
Dept: CT IMAGING | Facility: CLINIC | Age: 65
End: 2020-09-14
Payer: MEDICARE

## 2020-09-14 ENCOUNTER — OUTPATIENT (OUTPATIENT)
Dept: OUTPATIENT SERVICES | Facility: HOSPITAL | Age: 65
LOS: 1 days | End: 2020-09-14
Payer: MEDICARE

## 2020-09-14 DIAGNOSIS — Z95.810 PRESENCE OF AUTOMATIC (IMPLANTABLE) CARDIAC DEFIBRILLATOR: Chronic | ICD-10-CM

## 2020-09-14 DIAGNOSIS — S55.001A UNSPECIFIED INJURY OF ULNAR ARTERY AT FOREARM LEVEL, RIGHT ARM, INITIAL ENCOUNTER: Chronic | ICD-10-CM

## 2020-09-14 DIAGNOSIS — Z98.89 OTHER SPECIFIED POSTPROCEDURAL STATES: Chronic | ICD-10-CM

## 2020-09-14 DIAGNOSIS — Z00.8 ENCOUNTER FOR OTHER GENERAL EXAMINATION: ICD-10-CM

## 2020-09-14 PROCEDURE — 71250 CT THORAX DX C-: CPT

## 2020-09-14 PROCEDURE — 71250 CT THORAX DX C-: CPT | Mod: 26

## 2020-09-15 DIAGNOSIS — J18.9 PNEUMONIA, UNSPECIFIED ORGANISM: ICD-10-CM

## 2020-09-17 DIAGNOSIS — Z20.828 CONTACT WITH AND (SUSPECTED) EXPOSURE TO OTHER VIRAL COMMUNICABLE DISEASES: ICD-10-CM

## 2020-09-23 ENCOUNTER — APPOINTMENT (OUTPATIENT)
Dept: CARDIOLOGY | Facility: CLINIC | Age: 65
End: 2020-09-23
Payer: MEDICARE

## 2020-09-23 PROCEDURE — A9500: CPT

## 2020-09-23 PROCEDURE — 78452 HT MUSCLE IMAGE SPECT MULT: CPT

## 2020-09-23 PROCEDURE — 93015 CV STRESS TEST SUPVJ I&R: CPT

## 2020-09-23 RX ORDER — REGADENOSON 0.08 MG/ML
0.4 INJECTION, SOLUTION INTRAVENOUS
Qty: 1 | Refills: 0 | Status: COMPLETED | OUTPATIENT
Start: 2020-09-23

## 2020-09-23 RX ADMIN — REGADENOSON 5 MG/5ML: 0.08 INJECTION, SOLUTION INTRAVENOUS at 00:00

## 2020-10-16 ENCOUNTER — APPOINTMENT (OUTPATIENT)
Dept: PULMONOLOGY | Facility: CLINIC | Age: 65
End: 2020-10-16
Payer: MEDICARE

## 2020-10-16 VITALS — DIASTOLIC BLOOD PRESSURE: 92 MMHG | SYSTOLIC BLOOD PRESSURE: 158 MMHG

## 2020-10-16 VITALS
HEART RATE: 78 BPM | DIASTOLIC BLOOD PRESSURE: 92 MMHG | BODY MASS INDEX: 27.8 KG/M2 | SYSTOLIC BLOOD PRESSURE: 169 MMHG | OXYGEN SATURATION: 95 % | TEMPERATURE: 98.3 F | HEIGHT: 66 IN | WEIGHT: 173 LBS

## 2020-10-16 PROCEDURE — 99205 OFFICE O/P NEW HI 60 MIN: CPT | Mod: 25

## 2020-10-16 PROCEDURE — 94010 BREATHING CAPACITY TEST: CPT

## 2020-10-16 PROCEDURE — 94729 DIFFUSING CAPACITY: CPT

## 2020-10-16 PROCEDURE — 94727 GAS DIL/WSHOT DETER LNG VOL: CPT

## 2020-10-16 RX ORDER — DOXYCYCLINE 100 MG/1
100 CAPSULE ORAL TWICE DAILY
Qty: 20 | Refills: 0 | Status: DISCONTINUED | COMMUNITY
Start: 2020-09-15 | End: 2020-10-16

## 2020-10-16 NOTE — PROCEDURE
[FreeTextEntry1] : CAT scan reviewed\par \par Report date: 9/14/2020 \par  \par  View Order\par \par \par (Report matches study selected on Patient History pane)\par \par EXAM: CT CHEST \par \par \par PROCEDURE DATE: 09/14/2020 \par \par \par \par INTERPRETATION: CLINICAL INDICATION: Dilated aorta. \par \par Axial CT images of the chest are obtained without intravenous administration of contrast. \par \par Comparison is made with a prior chest CT of September 14, 2009. \par \par Left cardiac device with the leads terminating within the right atrium and right ventricle and the leads coursing through the chest wall into the thoracic cavity terminating in the region of the pericardium. \par \par No enlarged axillary, mediastinal or hilar lymph nodes. \par \par No pericardial effusion. Cardiomegaly. Coronary artery calcifications. \par \par It is difficult to accurately assess the size of the aortic root at the sinuses of Valsalva due to lack of intravenous contrast and superimposed cardiac motion artifact. Aortic root at the sinuses of Valsalva measures up to 4 cm as measured on the axial images, again given the limitations of the study. \par \par The ascending aorta at the level of the main pulmonary artery measures about 3.4 x 3.9 cm may have mildly increased in size since September 14, 2009 given differences in technique, previously measuring about 3.3 x 3.2 cm. The descending thoracic aorta at the level of the main pulmonary artery measures about 2.9 cm in transverse dimension, previously measuring about 2.6 cm. The aortic arch measures about 3 cm as measured on the axial images, previously measuring about 2.6 cm. \par \par Evaluation of the upper abdomen demonstrate 1.6 cm left adrenal gland indeterminate nodule which has been present since September 14, 2009, previously measuring about 9 mm. Elevation of the right hemidiaphragm as on the prior study. \par \par No pleural effusions. \par \par Evaluation of the lungs demonstrate 6 mm left upper lobe ground-glass nodule predominantly unchanged given presence of respiratory motion artifact on the prior study. Few right apical paraseptal tiny cysts. \par \par Small patchy ground-glass opacity within the left lower lobe at the left lung base abutting the dome of the left hemidiaphragm on image 123 of series 3 is new since September 14, 2009. \par \par No central endobronchial lesions. \par \par Degenerative changes of the spine. \par \par IMPRESSION: Thoracic aortic measurements as above may have mildly increased in size since September 14, 2009 chest CT angiogram given differences in technique. \par \par Small patchy left lower lobe ground-glass opacity is new since September 14, 2009, nonspecific finding. A 3-6 month follow-up noncontrast chest CT can be performed for further evaluation. \par \par \par \par RITA LIU M.D., ATTENDING RADIOLOGIST \par This document has been electronically signed. Sep 14 2020 10:34AM\par \par 10/16/2020\par Pulmonary function testing\par FEV1, FVC, and FEV1/FVC are within normal limits. TLC and subdivisions are normal. RV/TLC ratio is normal. Single breath diffusion capacity is normal.

## 2020-10-16 NOTE — DISCUSSION/SUMMARY
[FreeTextEntry1] : New LLL ground glass opacity left base unlikely of major clinical significance.  May be sequela of prior COVID infection.\par Stable GORDON ground glass nodule\par Normal lung function.

## 2020-10-16 NOTE — HISTORY OF PRESENT ILLNESS
[Former] : former [TextBox_4] : GRACE LO is a 65 year old  M referred for pulmonary evaluation for abnormal CT Scan.\par \par Pt started on abx after CT scan.\par \par Had COVID in March with fever but only minimal respiratory symptoms.\par \par No significant cough, wheezing, chest pain or SOB.\par \par \par Past pulmonary history. N\par Occupational Exposure..\par Family history of pulmonary disease. N\par Recent travel N\par Pets N\par  [TextBox_11] : social [YearQuit] : 1985

## 2020-12-04 ENCOUNTER — APPOINTMENT (OUTPATIENT)
Dept: ELECTROPHYSIOLOGY | Facility: CLINIC | Age: 65
End: 2020-12-04
Payer: MEDICARE

## 2020-12-04 ENCOUNTER — NON-APPOINTMENT (OUTPATIENT)
Age: 65
End: 2020-12-04

## 2020-12-04 VITALS — SYSTOLIC BLOOD PRESSURE: 138 MMHG | DIASTOLIC BLOOD PRESSURE: 85 MMHG | OXYGEN SATURATION: 95 % | HEART RATE: 79 BPM

## 2020-12-04 PROCEDURE — 93284 PRGRMG EVAL IMPLANTABLE DFB: CPT

## 2020-12-04 PROCEDURE — 99072 ADDL SUPL MATRL&STAF TM PHE: CPT

## 2020-12-19 ENCOUNTER — RX RENEWAL (OUTPATIENT)
Age: 65
End: 2020-12-19

## 2020-12-20 LAB
25(OH)D3 SERPL-MCNC: 47.3 NG/ML
ALBUMIN SERPL ELPH-MCNC: 4.7 G/DL
ALP BLD-CCNC: 47 U/L
ALT SERPL-CCNC: 13 U/L
ANION GAP SERPL CALC-SCNC: 14 MMOL/L
APPEARANCE: CLEAR
AST SERPL-CCNC: 17 U/L
BACTERIA: NEGATIVE
BASOPHILS # BLD AUTO: 0.01 K/UL
BASOPHILS NFR BLD AUTO: 0.4 %
BILIRUB DIRECT SERPL-MCNC: 0.1 MG/DL
BILIRUB INDIRECT SERPL-MCNC: 0.4 MG/DL
BILIRUB SERPL-MCNC: 0.6 MG/DL
BILIRUBIN URINE: NEGATIVE
BLOOD URINE: NEGATIVE
BUN SERPL-MCNC: 19 MG/DL
CALCIUM SERPL-MCNC: 9.7 MG/DL
CHLORIDE SERPL-SCNC: 105 MMOL/L
CHOLEST SERPL-MCNC: 267 MG/DL
CHOLEST/HDLC SERPL: 4.1 RATIO
CK SERPL-CCNC: 141 U/L
CO2 SERPL-SCNC: 22 MMOL/L
COLOR: YELLOW
CREAT SERPL-MCNC: 0.97 MG/DL
EOSINOPHIL # BLD AUTO: 0.06 K/UL
EOSINOPHIL NFR BLD AUTO: 2.3 %
ESTIMATED AVERAGE GLUCOSE: 148 MG/DL
FERRITIN SERPL-MCNC: 35 NG/ML
FOLATE SERPL-MCNC: 10.7 NG/ML
GLUCOSE QUALITATIVE U: NEGATIVE
GLUCOSE SERPL-MCNC: 133 MG/DL
HBA1C MFR BLD HPLC: 6.8 %
HCT VFR BLD CALC: 49.4 %
HDLC SERPL-MCNC: 65 MG/DL
HGB BLD-MCNC: 16.9 G/DL
HYALINE CASTS: 0 /LPF
IMM GRANULOCYTES NFR BLD AUTO: 0 %
IRON SERPL-MCNC: 124 UG/DL
KETONES URINE: NEGATIVE
LDLC SERPL CALC-MCNC: 177 MG/DL
LEUKOCYTE ESTERASE URINE: NEGATIVE
LYMPHOCYTES # BLD AUTO: 1 K/UL
LYMPHOCYTES NFR BLD AUTO: 37.6 %
MAN DIFF?: NORMAL
MCHC RBC-ENTMCNC: 31.6 PG
MCHC RBC-ENTMCNC: 34.2 GM/DL
MCV RBC AUTO: 92.5 FL
MICROSCOPIC-UA: NORMAL
MONOCYTES # BLD AUTO: 0.47 K/UL
MONOCYTES NFR BLD AUTO: 17.7 %
NEUTROPHILS # BLD AUTO: 1.12 K/UL
NEUTROPHILS NFR BLD AUTO: 42 %
NITRITE URINE: NEGATIVE
PH URINE: 6.5
PLATELET # BLD AUTO: 168 K/UL
POTASSIUM SERPL-SCNC: 4.6 MMOL/L
PROT SERPL-MCNC: 7.3 G/DL
PROTEIN URINE: NORMAL
RBC # BLD: 5.34 M/UL
RBC # FLD: 13.7 %
RED BLOOD CELLS URINE: 1 /HPF
SARS-COV-2 IGG SERPL IA-ACNC: 3.02 INDEX
SARS-COV-2 IGG SERPL QL IA: POSITIVE
SODIUM SERPL-SCNC: 141 MMOL/L
SPECIFIC GRAVITY URINE: 1.03
SQUAMOUS EPITHELIAL CELLS: 0 /HPF
T4 FREE SERPL-MCNC: 1.3 NG/DL
TRIGL SERPL-MCNC: 125 MG/DL
TSH SERPL-ACNC: 1.3 UIU/ML
UROBILINOGEN URINE: NORMAL
VIT B12 SERPL-MCNC: 550 PG/ML
WBC # FLD AUTO: 2.66 K/UL
WHITE BLOOD CELLS URINE: 1 /HPF

## 2020-12-30 ENCOUNTER — APPOINTMENT (OUTPATIENT)
Dept: CARDIOLOGY | Facility: CLINIC | Age: 65
End: 2020-12-30
Payer: MEDICARE

## 2020-12-30 ENCOUNTER — NON-APPOINTMENT (OUTPATIENT)
Age: 65
End: 2020-12-30

## 2020-12-30 VITALS
OXYGEN SATURATION: 98 % | TEMPERATURE: 98.4 F | HEIGHT: 66 IN | SYSTOLIC BLOOD PRESSURE: 120 MMHG | DIASTOLIC BLOOD PRESSURE: 70 MMHG | WEIGHT: 183 LBS | BODY MASS INDEX: 29.41 KG/M2 | HEART RATE: 80 BPM

## 2020-12-30 DIAGNOSIS — R91.8 OTHER NONSPECIFIC ABNORMAL FINDING OF LUNG FIELD: ICD-10-CM

## 2020-12-30 DIAGNOSIS — R71.8 OTHER ABNORMALITY OF RED BLOOD CELLS: ICD-10-CM

## 2020-12-30 DIAGNOSIS — Z12.11 ENCOUNTER FOR SCREENING FOR MALIGNANT NEOPLASM OF COLON: ICD-10-CM

## 2020-12-30 DIAGNOSIS — D70.9 NEUTROPENIA, UNSPECIFIED: ICD-10-CM

## 2020-12-30 DIAGNOSIS — I70.90 UNSPECIFIED ATHEROSCLEROSIS: ICD-10-CM

## 2020-12-30 PROCEDURE — 99072 ADDL SUPL MATRL&STAF TM PHE: CPT

## 2020-12-30 PROCEDURE — 99214 OFFICE O/P EST MOD 30 MIN: CPT

## 2020-12-30 PROCEDURE — 93000 ELECTROCARDIOGRAM COMPLETE: CPT

## 2020-12-30 NOTE — PHYSICAL EXAM
[General Appearance - Well Developed] : well developed [Normal Appearance] : normal appearance [Well Groomed] : well groomed [General Appearance - Well Nourished] : well nourished [No Deformities] : no deformities [General Appearance - In No Acute Distress] : no acute distress [Normal Conjunctiva] : the conjunctiva exhibited no abnormalities [Eyelids - No Xanthelasma] : the eyelids demonstrated no xanthelasmas [Normal Oral Mucosa] : normal oral mucosa [No Oral Pallor] : no oral pallor [No Oral Cyanosis] : no oral cyanosis [Normal Jugular Venous A Waves Present] : normal jugular venous A waves present [Normal Jugular Venous V Waves Present] : normal jugular venous V waves present [No Jugular Venous Gnuyễn A Waves] : no jugular venous nguyễn A waves [Respiration, Rhythm And Depth] : normal respiratory rhythm and effort [Exaggerated Use Of Accessory Muscles For Inspiration] : no accessory muscle use [Auscultation Breath Sounds / Voice Sounds] : lungs were clear to auscultation bilaterally [Heart Rate And Rhythm] : heart rate and rhythm were normal [Heart Sounds] : normal S1 and S2 [Murmurs] : no murmurs present [Abdomen Soft] : soft [Abdomen Tenderness] : non-tender [Abdomen Mass (___ Cm)] : no abdominal mass palpated [Abnormal Walk] : normal gait [Gait - Sufficient For Exercise Testing] : the gait was sufficient for exercise testing [Nail Clubbing] : no clubbing of the fingernails [Cyanosis, Localized] : no localized cyanosis [Petechial Hemorrhages (___cm)] : no petechial hemorrhages [Skin Color & Pigmentation] : normal skin color and pigmentation [] : no rash [No Venous Stasis] : no venous stasis [Skin Lesions] : no skin lesions [No Skin Ulcers] : no skin ulcer [No Xanthoma] : no  xanthoma was observed [Oriented To Time, Place, And Person] : oriented to person, place, and time [Affect] : the affect was normal [Mood] : the mood was normal [No Anxiety] : not feeling anxious [FreeTextEntry1] : 4x4 cm lipoma left back area

## 2021-01-03 LAB
25(OH)D3 SERPL-MCNC: 57.7 NG/ML
ALBUMIN SERPL ELPH-MCNC: 4.4 G/DL
ALP BLD-CCNC: 60 U/L
ALT SERPL-CCNC: 22 U/L
ANION GAP SERPL CALC-SCNC: 11 MMOL/L
APPEARANCE: CLEAR
AST SERPL-CCNC: 28 U/L
BACTERIA: NEGATIVE
BASOPHILS # BLD AUTO: 0.02 K/UL
BASOPHILS NFR BLD AUTO: 0.6 %
BILIRUB DIRECT SERPL-MCNC: 0.1 MG/DL
BILIRUB INDIRECT SERPL-MCNC: 0.4 MG/DL
BILIRUB SERPL-MCNC: 0.5 MG/DL
BILIRUBIN URINE: NEGATIVE
BLOOD URINE: NEGATIVE
BUN SERPL-MCNC: 14 MG/DL
CALCIUM SERPL-MCNC: 9.8 MG/DL
CHLORIDE SERPL-SCNC: 103 MMOL/L
CHOLEST SERPL-MCNC: 281 MG/DL
CK SERPL-CCNC: 157 U/L
CO2 SERPL-SCNC: 26 MMOL/L
COLOR: YELLOW
CREAT SERPL-MCNC: 0.94 MG/DL
CREAT SPEC-SCNC: 222 MG/DL
EOSINOPHIL # BLD AUTO: 0.09 K/UL
EOSINOPHIL NFR BLD AUTO: 2.6 %
ESTIMATED AVERAGE GLUCOSE: 169 MG/DL
FERRITIN SERPL-MCNC: 66 NG/ML
FOLATE SERPL-MCNC: 13 NG/ML
GLUCOSE QUALITATIVE U: NORMAL
GLUCOSE SERPL-MCNC: 160 MG/DL
HBA1C MFR BLD HPLC: 7.5 %
HCT VFR BLD CALC: 50.1 %
HDLC SERPL-MCNC: 80 MG/DL
HGB BLD-MCNC: 16.7 G/DL
HYALINE CASTS: 3 /LPF
IMM GRANULOCYTES NFR BLD AUTO: 0.3 %
IRON SERPL-MCNC: 95 UG/DL
KETONES URINE: NEGATIVE
LDLC SERPL CALC-MCNC: 176 MG/DL
LEUKOCYTE ESTERASE URINE: NEGATIVE
LYMPHOCYTES # BLD AUTO: 1.33 K/UL
LYMPHOCYTES NFR BLD AUTO: 38.6 %
MAN DIFF?: NORMAL
MCHC RBC-ENTMCNC: 31.3 PG
MCHC RBC-ENTMCNC: 33.3 GM/DL
MCV RBC AUTO: 94 FL
MICROALBUMIN 24H UR DL<=1MG/L-MCNC: 1.9 MG/DL
MICROALBUMIN/CREAT 24H UR-RTO: 9 MG/G
MICROSCOPIC-UA: NORMAL
MONOCYTES # BLD AUTO: 0.57 K/UL
MONOCYTES NFR BLD AUTO: 16.5 %
NEUTROPHILS # BLD AUTO: 1.43 K/UL
NEUTROPHILS NFR BLD AUTO: 41.4 %
NITRITE URINE: NEGATIVE
NONHDLC SERPL-MCNC: 201 MG/DL
PH URINE: 6.5
PLATELET # BLD AUTO: 178 K/UL
POTASSIUM SERPL-SCNC: 4.5 MMOL/L
PROT SERPL-MCNC: 7.5 G/DL
PROTEIN URINE: NORMAL
PSA SERPL-MCNC: 3.71 NG/ML
RBC # BLD: 5.33 M/UL
RBC # FLD: 15 %
RED BLOOD CELLS URINE: 1 /HPF
SARS-COV-2 IGG SERPL IA-ACNC: 14.3 INDEX
SARS-COV-2 IGG SERPL QL IA: POSITIVE
SODIUM SERPL-SCNC: 141 MMOL/L
SPECIFIC GRAVITY URINE: 1.02
SQUAMOUS EPITHELIAL CELLS: 1 /HPF
TRIGL SERPL-MCNC: 124 MG/DL
UROBILINOGEN URINE: NORMAL
VIT B12 SERPL-MCNC: 474 PG/ML
WBC # FLD AUTO: 3.45 K/UL
WHITE BLOOD CELLS URINE: 1 /HPF

## 2021-01-04 ENCOUNTER — OUTPATIENT (OUTPATIENT)
Dept: OUTPATIENT SERVICES | Facility: HOSPITAL | Age: 66
LOS: 1 days | End: 2021-01-04
Payer: MEDICARE

## 2021-01-04 ENCOUNTER — APPOINTMENT (OUTPATIENT)
Dept: ULTRASOUND IMAGING | Facility: CLINIC | Age: 66
End: 2021-01-04
Payer: MEDICARE

## 2021-01-04 ENCOUNTER — RESULT REVIEW (OUTPATIENT)
Age: 66
End: 2021-01-04

## 2021-01-04 DIAGNOSIS — Z98.89 OTHER SPECIFIED POSTPROCEDURAL STATES: Chronic | ICD-10-CM

## 2021-01-04 DIAGNOSIS — S55.001A UNSPECIFIED INJURY OF ULNAR ARTERY AT FOREARM LEVEL, RIGHT ARM, INITIAL ENCOUNTER: Chronic | ICD-10-CM

## 2021-01-04 DIAGNOSIS — Z95.810 PRESENCE OF AUTOMATIC (IMPLANTABLE) CARDIAC DEFIBRILLATOR: Chronic | ICD-10-CM

## 2021-01-04 DIAGNOSIS — Z00.8 ENCOUNTER FOR OTHER GENERAL EXAMINATION: ICD-10-CM

## 2021-01-04 PROCEDURE — 76882 US LMTD JT/FCL EVL NVASC XTR: CPT | Mod: 26,LT

## 2021-01-04 PROCEDURE — 76882 US LMTD JT/FCL EVL NVASC XTR: CPT

## 2021-01-07 ENCOUNTER — NON-APPOINTMENT (OUTPATIENT)
Age: 66
End: 2021-01-07

## 2021-01-27 ENCOUNTER — APPOINTMENT (OUTPATIENT)
Dept: ORTHOPEDIC SURGERY | Facility: CLINIC | Age: 66
End: 2021-01-27
Payer: OTHER MISCELLANEOUS

## 2021-01-27 VITALS
SYSTOLIC BLOOD PRESSURE: 175 MMHG | HEIGHT: 66 IN | BODY MASS INDEX: 29.41 KG/M2 | OXYGEN SATURATION: 78 % | DIASTOLIC BLOOD PRESSURE: 94 MMHG | HEART RATE: 96 BPM | WEIGHT: 183 LBS

## 2021-01-27 DIAGNOSIS — M54.16 RADICULOPATHY, LUMBAR REGION: ICD-10-CM

## 2021-01-27 PROCEDURE — 72110 X-RAY EXAM L-2 SPINE 4/>VWS: CPT

## 2021-01-27 PROCEDURE — 99072 ADDL SUPL MATRL&STAF TM PHE: CPT

## 2021-01-27 PROCEDURE — 72170 X-RAY EXAM OF PELVIS: CPT | Mod: 59

## 2021-01-27 PROCEDURE — 96372 THER/PROPH/DIAG INJ SC/IM: CPT | Mod: 59

## 2021-01-27 PROCEDURE — 99214 OFFICE O/P EST MOD 30 MIN: CPT

## 2021-01-27 RX ADMIN — KETOROLAC TROMETHAMINE 1 MG/ML: 30 INJECTION, SOLUTION INTRAMUSCULAR; INTRAVENOUS at 00:00

## 2021-01-27 NOTE — REASON FOR VISIT
[Follow-Up Visit] : a follow-up visit for [Worker's Compensation] : this visit is related to worker's compensation [Back Pain] : back pain [Radiculopathy] : radiculopathy

## 2021-01-27 NOTE — DISCUSSION/SUMMARY
[Medication Risks Reviewed] : Medication risks reviewed [de-identified] : At this point Prescribed a course of oral steroids as well as methocarbamol.  Recommended MRI of the lumbar spine given the increase in pain as well as symptomatic weakness of his right leg and feeling of leg buckling and weakness.  I will see him back after the MRI to discuss further treatment options.\par \par The patient was educated regarding their condition, treatment options as well as prescribed course of treatment. \par Risks and benefits as well as alternatives to the proposed treatment were also provided to the patient \par They were given the opportunity to have all their questions answered to their satisfaction.\par \par Vital signs were reviewed with the patient and the patient was instructed to followup with their primary care provider for further management.\par \par Healthy lifestyle recommendations were also made including a tobacco free lifestyle, proper diet, and weight control.

## 2021-01-27 NOTE — PHYSICAL EXAM
[NL] : Motor strength of the left lower extremity was normal [___/5] : right ([unfilled]/5) [Motor Strength Lower Extremities] : right (5/5) [LE] : Sensory: Intact in bilateral lower extremities [1+] : right patella 1+ [2+] : left ankle jerk 2+ [DP] : dorsalis pedis 2+ and symmetric bilaterally [PT] : posterior tibial 2+ and symmetric bilaterally [Stooped] : stooped [SLR] : positive straight leg raise [] : Motor: [de-identified] : 4 views lumbar spine obtained today demonstrate no significant scoliosis.  Straightening of lumbar lordosis.  Significant degeneration seen at L4-5 which is a site of prior surgery with bridging anterior osteophyte which is unchanged when compared with prior x-rays.  No dynamic instability between flexion-extension.  No obvious acute fractures.\par \par Normal appearance of the hips bilaterally.  No acute fractures.  No significant degeneration. [Poor Appearance] : well-appearing [Acute Distress] : not in acute distress [Obese] : not obese [Abl Mood] : in a normal mood [Abl Affect] : with normal affect [Poor Coordination] : normal coordination [Disorientation] : oriented x 3 [Plantar Reflex Right Only] : absent on the right [Plantar Reflex Left Only] : absent on the left [DTR Reflexes Clonus Of Right Ankle (___ Beats)] : absent on the right [DTR Reflexes Clonus Of Left Ankle (___ Beats)] : absent on the left [FreeTextEntry2] : The pt is awake, alert and oriented to self, place and time, is comfortable but in no acute distress. Gait examination reveals a narrow based, non-ataxic, non-antalgic gait. Can heel and toe walk without difficulty. Inspection of neck, back and lower extremities bilaterally reveals no rashes or ecchymotic lesions.  There is no obvious abnormal spinal curvature in the sagittal and coronal planes. There is tenderness over the lumbar spine in the midline as well as in the right paraspinal musculature more than the left. There is no sacroiliac tenderness. No greater trochanteric tenderness bilaterally. No atrophy or abnormal movements noted in the upper or lower extremities. There is no swelling noted in the upper or lower extremities bilaterally. No cervical lymphadenopathy noted anteriorly.\par Lumbar spine range of motion is restricted with forward flexion of 40° and extension of 25° with extension more painful than flexion.  \par Negative straight leg raise to 45° in the supine position bilaterally with primarily low back pain noted. There is no groin pain with hip internal rotation and a negative JAMIL test bilaterally.  [de-identified] : well-healed lumbar midline incision from prior surgery

## 2021-01-27 NOTE — HISTORY OF PRESENT ILLNESS
[Worsening] : worsening [8] : a current pain level of 8/10 [Walking] : walking [Daily] : ~He/She~ states the symptoms seem to be occuring daily [Bending] : worsened by bending [___ wks] : [unfilled] week(s) ago [de-identified] : Patient is here today due to his work related injury. Patient states his right leg has been buckling a lot lately and increase in low back pain. His leg buckled and he had right low back pain with pain radiating down right leg posterior thigh to calf.\par Does own exercises [de-identified] : voltaren gel

## 2021-01-29 ENCOUNTER — APPOINTMENT (OUTPATIENT)
Dept: ENDOCRINOLOGY | Facility: CLINIC | Age: 66
End: 2021-01-29
Payer: MEDICARE

## 2021-01-29 VITALS
WEIGHT: 185 LBS | OXYGEN SATURATION: 99 % | SYSTOLIC BLOOD PRESSURE: 160 MMHG | BODY MASS INDEX: 29.73 KG/M2 | HEIGHT: 66 IN | TEMPERATURE: 98.6 F | HEART RATE: 86 BPM | RESPIRATION RATE: 16 BRPM | DIASTOLIC BLOOD PRESSURE: 82 MMHG

## 2021-01-29 LAB — GLUCOSE BLDC GLUCOMTR-MCNC: 179

## 2021-01-29 PROCEDURE — 99072 ADDL SUPL MATRL&STAF TM PHE: CPT

## 2021-01-29 PROCEDURE — 99214 OFFICE O/P EST MOD 30 MIN: CPT

## 2021-01-29 PROCEDURE — 82962 GLUCOSE BLOOD TEST: CPT

## 2021-02-07 NOTE — HISTORY OF PRESENT ILLNESS
[FreeTextEntry1] : Mr. LO  is a 66 year year old male who returns today for endocrine reevaluation.  Patient returns with regard to  a history of type 2 dm. He is not taking any rx medication for the DM.   Additional history includes that of htn  and hyperlipidemia along with cardiomyopathy, AICD and Pos for covid last March without residual sequelae. Recent A1c returned at 7.5 %-had been running from 6.8-7.9% over the pst two years.\par Denies any hx of microvascular complications.\par History of Vitamin d d deficiency.\par Notes some burning feet.\par Wt gain over pst 6 months -hurt back -couldn't’t exercise\par hx of laminectomy x 2\par Not active\par Optho neg\par takes vit d and c and three in one calc mg and zinc\par latest vit d 25-OH at 57\par

## 2021-02-12 RX ORDER — KETOROLAC TROMETHAMINE 30 MG/ML
30 INJECTION, SOLUTION INTRAMUSCULAR; INTRAVENOUS
Qty: 1 | Refills: 0 | Status: COMPLETED | OUTPATIENT
Start: 2021-01-27

## 2021-02-26 ENCOUNTER — APPOINTMENT (OUTPATIENT)
Dept: CT IMAGING | Facility: CLINIC | Age: 66
End: 2021-02-26
Payer: MEDICARE

## 2021-02-26 ENCOUNTER — RESULT REVIEW (OUTPATIENT)
Age: 66
End: 2021-02-26

## 2021-02-26 ENCOUNTER — OUTPATIENT (OUTPATIENT)
Dept: OUTPATIENT SERVICES | Facility: HOSPITAL | Age: 66
LOS: 1 days | End: 2021-02-26
Payer: MEDICARE

## 2021-02-26 DIAGNOSIS — R91.1 SOLITARY PULMONARY NODULE: ICD-10-CM

## 2021-02-26 DIAGNOSIS — Z95.810 PRESENCE OF AUTOMATIC (IMPLANTABLE) CARDIAC DEFIBRILLATOR: Chronic | ICD-10-CM

## 2021-02-26 DIAGNOSIS — Z98.89 OTHER SPECIFIED POSTPROCEDURAL STATES: Chronic | ICD-10-CM

## 2021-02-26 DIAGNOSIS — S55.001A UNSPECIFIED INJURY OF ULNAR ARTERY AT FOREARM LEVEL, RIGHT ARM, INITIAL ENCOUNTER: Chronic | ICD-10-CM

## 2021-02-26 PROCEDURE — 71250 CT THORAX DX C-: CPT

## 2021-02-26 PROCEDURE — 71250 CT THORAX DX C-: CPT | Mod: 26

## 2021-03-05 ENCOUNTER — NON-APPOINTMENT (OUTPATIENT)
Age: 66
End: 2021-03-05

## 2021-03-10 ENCOUNTER — NON-APPOINTMENT (OUTPATIENT)
Age: 66
End: 2021-03-10

## 2021-03-10 ENCOUNTER — APPOINTMENT (OUTPATIENT)
Dept: ELECTROPHYSIOLOGY | Facility: CLINIC | Age: 66
End: 2021-03-10
Payer: MEDICARE

## 2021-03-10 PROCEDURE — 93296 REM INTERROG EVL PM/IDS: CPT

## 2021-03-10 PROCEDURE — 93295 DEV INTERROG REMOTE 1/2/MLT: CPT

## 2021-03-12 ENCOUNTER — OUTPATIENT (OUTPATIENT)
Dept: OUTPATIENT SERVICES | Facility: HOSPITAL | Age: 66
LOS: 1 days | End: 2021-03-12
Payer: COMMERCIAL

## 2021-03-12 ENCOUNTER — RESULT REVIEW (OUTPATIENT)
Age: 66
End: 2021-03-12

## 2021-03-12 ENCOUNTER — APPOINTMENT (OUTPATIENT)
Dept: CT IMAGING | Facility: CLINIC | Age: 66
End: 2021-03-12
Payer: OTHER MISCELLANEOUS

## 2021-03-12 DIAGNOSIS — Z00.8 ENCOUNTER FOR OTHER GENERAL EXAMINATION: ICD-10-CM

## 2021-03-12 DIAGNOSIS — Z98.89 OTHER SPECIFIED POSTPROCEDURAL STATES: Chronic | ICD-10-CM

## 2021-03-12 DIAGNOSIS — S55.001A UNSPECIFIED INJURY OF ULNAR ARTERY AT FOREARM LEVEL, RIGHT ARM, INITIAL ENCOUNTER: Chronic | ICD-10-CM

## 2021-03-12 DIAGNOSIS — Z95.810 PRESENCE OF AUTOMATIC (IMPLANTABLE) CARDIAC DEFIBRILLATOR: Chronic | ICD-10-CM

## 2021-03-12 DIAGNOSIS — M54.16 RADICULOPATHY, LUMBAR REGION: ICD-10-CM

## 2021-03-12 PROCEDURE — 76376 3D RENDER W/INTRP POSTPROCES: CPT

## 2021-03-12 PROCEDURE — 76376 3D RENDER W/INTRP POSTPROCES: CPT | Mod: 26

## 2021-03-12 PROCEDURE — 72131 CT LUMBAR SPINE W/O DYE: CPT | Mod: 26

## 2021-03-12 PROCEDURE — 72131 CT LUMBAR SPINE W/O DYE: CPT

## 2021-03-22 ENCOUNTER — APPOINTMENT (OUTPATIENT)
Dept: ORTHOPEDIC SURGERY | Facility: CLINIC | Age: 66
End: 2021-03-22
Payer: OTHER MISCELLANEOUS

## 2021-03-22 VITALS
HEIGHT: 66 IN | WEIGHT: 179 LBS | TEMPERATURE: 97.7 F | SYSTOLIC BLOOD PRESSURE: 162 MMHG | HEART RATE: 74 BPM | DIASTOLIC BLOOD PRESSURE: 90 MMHG | BODY MASS INDEX: 28.77 KG/M2

## 2021-03-22 PROCEDURE — 99072 ADDL SUPL MATRL&STAF TM PHE: CPT

## 2021-03-22 PROCEDURE — 99213 OFFICE O/P EST LOW 20 MIN: CPT

## 2021-03-22 NOTE — REASON FOR VISIT
[Follow-Up Visit] : a follow-up visit for [Workers' Comp: Date of Injury: _______] : This visit is related to worker's compensation. Date of Injury: [unfilled] [Degenerative Joint Disease] : degenerative joint disease [Herniated Discs] : herniated discs [Back Pain] : back pain [Radiculopathy] : radiculopathy

## 2021-03-22 NOTE — HISTORY OF PRESENT ILLNESS
[No] : The patient is currently not working. [de-identified] : Patient is here today for re evaluation on his chronic low back right leg pain. Patient still did not get authorization for physical therapy. Patient states good days bad days depending on his activity level.\par Toradal IM at last visit helped, medrol dosepak unsure if it helped. Takes ibuprofen prn and volatren gel is helpful. Did not take methocarbamo

## 2021-03-22 NOTE — DISCUSSION/SUMMARY
[Medication Risks Reviewed] : Medication risks reviewed [de-identified] : The patient presents with ongoing symptoms of right-sided low back pain primarily with pain radiating to his right leg as well as some intermittent buckling.  CT scan performed recently demonstrates progressive disc protrusion at L5-S1 with foraminal stenosis as well as prior history of right-sided L4-5 hemilaminectomy.  Degenerative changes at L4-5 noted with bridging anterior osteophytes.  In this setting recommended nerve conduction studies to determine neural function since there is progressive stenosis and neural compression noted at primarily at the L5-S1 level.  Further treatment options can be discussed based on the electrodiagnostic studies.\par \par Given the patient's persistent pain and trial of medications recommended physical therapy as well.  This has been denied previously and I strongly recommended he resume physical therapy for his new symptoms of leg buckling and leg as well as back pain.  He will continue use of ibuprofen 800 mg which has been helpful for him in the past as well as Voltaren gel 3% which has been helpful as well.  We will continue using other medications on as-needed basis since he is not interested in gabapentin or additional methocarbamol at this time.\par \par The patient was educated regarding their condition, treatment options as well as prescribed course of treatment. \par Risks and benefits as well as alternatives to the proposed treatment were also provided to the patient \par They were given the opportunity to have all their questions answered to their satisfaction.\par \par Vital signs were reviewed with the patient and the patient was instructed to followup with their primary care provider for further management.\par \par Healthy lifestyle recommendations were also made including a tobacco free lifestyle, proper diet, and weight control.

## 2021-03-22 NOTE — PHYSICAL EXAM
[SLR] : positive straight leg raise [] : Motor: [NL] : Motor strength of the left lower extremity was normal [___/5] : right ([unfilled]/5) [Motor Strength Lower Extremities] : right (5/5) [LE] : Sensory: Intact in bilateral lower extremities [1+] : right patella 1+ [2+] : left ankle jerk 2+ [DP] : dorsalis pedis 2+ and symmetric bilaterally [PT] : posterior tibial 2+ and symmetric bilaterally [de-identified] : EXAM: CT 3D RECONSTRUCT FARIDA DUNHAM\par \par EXAM: CT LUMBAR SPINE\par \par PROCEDURE DATE: 03/12/2021\par \par INTERPRETATION: HISTORY: Lower back pain. Prior laminectomy.\par \par Helical CT imaging of the lumbar spine was performed without intravenous contrast. Sagittal and coronal reformats were provided. 3-D reformats were performed on a separate workstation.\par \par Correlation is made with prior CT from May 31, 2012.\par \par FINDINGS:\par \par SEGMENTATION: There are rudimentary ribs/articular transverse processes at the level of L1. This is in keeping with the nomenclature on the prior CT.\par \par ALIGNMENT: There is slight leftward curvature of the lumbar spine. Lumbar lordosis is maintained. There is mild retrolisthesis of L5 on S1, unchanged.\par \par VERTEBRAL BODIES AND OSSEOUS structures: No acute compression deformity. There is redemonstration of areas of lucency within the bilateral posterior iliac bones and within the partially imaged T11 vertebral body which are grossly unchanged compared to the prior examination. These may be related to osseous hemangiomata. There is no evidence of acute fracture. Patient is again noted to be status post right hemilaminectomy and right-sided facetectomy at the level of L4/L5 and L5/S1.\par \par DISC SPACES: There is mild to moderate disc height loss at L4/L5.\par \par SACROILIAC JOINTS: There is bilateral sacral iliac joint arthrosis with bridging osteophyte formation anteriorly.\par \par IMAGED ABDOMINAL AND PELVIC STRUCTURES: Unremarkable.\par \par The findings at the individual levels are as follows:\par \par T11/T12: There is bilateral facet arthrosis without spinal canal or neural foraminal narrowing. Findings are grossly unchanged.\par \par T12/L1: There is no spinal canal or neural foraminal narrowing.\par \par L1/2: There is a minimal disc bulge resulting in mild bilateral neural foraminal narrowing. There is no central canal narrowing. Findings are grossly unchanged.\par \par L2/3: There is bilateral facet arthrosis. There is a minimal disc bulge which is asymmetric to the right. Findings result in mild bilateral neural foraminal narrowing which appears slightly more prominent compared to the prior examination. There is no central canal narrowing.\par \par L3/L4: There is a mild diffuse disc bulge bilateral facet arthrosis. Findings result in mild to moderate left and mild right neural foraminal narrowing there is flattening of the ventral thecal sac. Findings appear grossly unchanged.\par \par L4/5: There is a diffuse mineralized disc bulge which is asymmetric to the right with a superimposed right foraminal disc protrusion. There is posterior osseous ridging which is asymmetric to the right. There is effacement of the right lateral recess and flattening of the ventral thecal sac. There is severe right and moderate left neural foraminal narrowing. Findings appear grossly similar compared to the prior examination..\par \par L5/S1: There is a diffuse disc bulge with a superimposed broad-based right lateral recess/foraminal disc protrusion. There is posterior osseous ridging with left-sided facet arthrosis. Findings result in effacement of the right lateral recess which appears slightly more prominent compared to the prior examination. There is unchanged moderate to severe bilateral neural foraminal narrowing.\par \par IMPRESSION:\par \par Multilevel lumbar spondylosis. Redemonstration of right-sided hemilaminectomy and facetectomy at L4/L5 and L5/S1.\par \par L2/3: There is mild bilateral neural foraminal narrowing which appears slightly more prominent compared to the prior examination.\par \par L5/S1: There is effacement of the right lateral recess which appears slightly more prominent compared to the prior examination.\par \par ANITA VARGAS MD; Attending Radiologist\par This document has been electronically signed. Mar 15 2021 12:00PM [Poor Appearance] : well-appearing [Acute Distress] : not in acute distress [Obese] : not obese [Abl Mood] : in a normal mood [Abl Affect] : with normal affect [Poor Coordination] : normal coordination [Disorientation] : oriented x 3 [Plantar Reflex Right Only] : absent on the right [Plantar Reflex Left Only] : absent on the left [DTR Reflexes Clonus Of Right Ankle (___ Beats)] : absent on the right [DTR Reflexes Clonus Of Left Ankle (___ Beats)] : absent on the left [FreeTextEntry2] : The pt is awake, alert and oriented to self, place and time, is comfortable but in no acute distress. Gait examination reveals a narrow based, non-ataxic, non-antalgic gait. Can heel and toe walk without difficulty. Inspection of neck, back and lower extremities bilaterally reveals no rashes or ecchymotic lesions.  There is no obvious abnormal spinal curvature in the sagittal and coronal planes. There is tenderness over the lumbar spine in the midline as well as in the right paraspinal musculature more than the left. There is no sacroiliac tenderness. No greater trochanteric tenderness bilaterally. No atrophy or abnormal movements noted in the upper or lower extremities. There is no swelling noted in the upper or lower extremities bilaterally. No cervical lymphadenopathy noted anteriorly.\par Lumbar spine range of motion is restricted with forward flexion of 40° and extension of 25° with extension more painful than flexion.  \par Negative straight leg raise to 45° in the supine position bilaterally with primarily low back pain noted. There is no groin pain with hip internal rotation and a negative JAMIL test bilaterally.  [de-identified] : well-healed lumbar midline incision from prior surgery

## 2021-04-05 ENCOUNTER — LABORATORY RESULT (OUTPATIENT)
Age: 66
End: 2021-04-05

## 2021-04-05 ENCOUNTER — APPOINTMENT (OUTPATIENT)
Dept: CARDIOLOGY | Facility: CLINIC | Age: 66
End: 2021-04-05
Payer: MEDICARE

## 2021-04-05 ENCOUNTER — TRANSCRIPTION ENCOUNTER (OUTPATIENT)
Age: 66
End: 2021-04-05

## 2021-04-05 ENCOUNTER — NON-APPOINTMENT (OUTPATIENT)
Age: 66
End: 2021-04-05

## 2021-04-05 VITALS
HEART RATE: 74 BPM | WEIGHT: 179 LBS | SYSTOLIC BLOOD PRESSURE: 139 MMHG | TEMPERATURE: 98 F | HEIGHT: 66 IN | DIASTOLIC BLOOD PRESSURE: 80 MMHG | OXYGEN SATURATION: 96 % | BODY MASS INDEX: 28.77 KG/M2

## 2021-04-05 DIAGNOSIS — R94.39 ABNORMAL RESULT OF OTHER CARDIOVASCULAR FUNCTION STUDY: ICD-10-CM

## 2021-04-05 PROCEDURE — 99214 OFFICE O/P EST MOD 30 MIN: CPT | Mod: 25

## 2021-04-05 PROCEDURE — 99072 ADDL SUPL MATRL&STAF TM PHE: CPT

## 2021-04-05 PROCEDURE — 93015 CV STRESS TEST SUPVJ I&R: CPT

## 2021-04-05 PROCEDURE — 93000 ELECTROCARDIOGRAM COMPLETE: CPT | Mod: 59

## 2021-04-05 PROCEDURE — 78452 HT MUSCLE IMAGE SPECT MULT: CPT

## 2021-04-05 PROCEDURE — A9500: CPT

## 2021-04-05 RX ORDER — REGADENOSON 0.08 MG/ML
0.4 INJECTION, SOLUTION INTRAVENOUS
Qty: 1 | Refills: 0 | Status: COMPLETED | OUTPATIENT
Start: 2021-04-05

## 2021-04-05 RX ORDER — DEXLANSOPRAZOLE 30 MG/1
30 CAPSULE, DELAYED RELEASE ORAL
Qty: 30 | Refills: 0 | Status: DISCONTINUED | COMMUNITY
Start: 2020-10-28 | End: 2021-04-05

## 2021-04-05 RX ORDER — METHOCARBAMOL 500 MG/1
500 TABLET, FILM COATED ORAL 3 TIMES DAILY
Qty: 30 | Refills: 0 | Status: DISCONTINUED | COMMUNITY
Start: 2021-01-27 | End: 2021-04-05

## 2021-04-05 RX ADMIN — REGADENOSON 5 MG/5ML: 0.08 INJECTION, SOLUTION INTRAVENOUS at 00:00

## 2021-04-05 NOTE — HISTORY OF PRESENT ILLNESS
[FreeTextEntry1] : pt presents secondary to transient episodes of chest discomfort fleeting that occurred after bending down .pt with dyspepsia at that time .pt took dexilant  with improvement however had mild episode today .pt with hx of cardiomyopathy /aicd pt denies any   dizziness ,lightheadedness ,nausea vomiting diaphoresis pt transmitted re above no events with aicd \par

## 2021-04-05 NOTE — PHYSICAL EXAM
[General Appearance - Well Developed] : well developed [Normal Appearance] : normal appearance [Well Groomed] : well groomed [General Appearance - Well Nourished] : well nourished [No Deformities] : no deformities [General Appearance - In No Acute Distress] : no acute distress [Normal Conjunctiva] : the conjunctiva exhibited no abnormalities [Eyelids - No Xanthelasma] : the eyelids demonstrated no xanthelasmas [Normal Oral Mucosa] : normal oral mucosa [No Oral Pallor] : no oral pallor [No Oral Cyanosis] : no oral cyanosis [Normal Jugular Venous A Waves Present] : normal jugular venous A waves present [Normal Jugular Venous V Waves Present] : normal jugular venous V waves present [No Jugular Venous Nguyễn A Waves] : no jugular venous nguyễn A waves [Respiration, Rhythm And Depth] : normal respiratory rhythm and effort [Exaggerated Use Of Accessory Muscles For Inspiration] : no accessory muscle use [Auscultation Breath Sounds / Voice Sounds] : lungs were clear to auscultation bilaterally [Heart Rate And Rhythm] : heart rate and rhythm were normal [Heart Sounds] : normal S1 and S2 [Murmurs] : no murmurs present [Abdomen Soft] : soft [Abdomen Tenderness] : non-tender [Abdomen Mass (___ Cm)] : no abdominal mass palpated [Abnormal Walk] : normal gait [Gait - Sufficient For Exercise Testing] : the gait was sufficient for exercise testing [Cyanosis, Localized] : no localized cyanosis [Nail Clubbing] : no clubbing of the fingernails [Petechial Hemorrhages (___cm)] : no petechial hemorrhages [Skin Color & Pigmentation] : normal skin color and pigmentation [] : no rash [No Venous Stasis] : no venous stasis [Skin Lesions] : no skin lesions [No Skin Ulcers] : no skin ulcer [No Xanthoma] : no  xanthoma was observed [Oriented To Time, Place, And Person] : oriented to person, place, and time [Affect] : the affect was normal [Mood] : the mood was normal [No Anxiety] : not feeling anxious

## 2021-04-06 ENCOUNTER — NON-APPOINTMENT (OUTPATIENT)
Age: 66
End: 2021-04-06

## 2021-04-06 ENCOUNTER — APPOINTMENT (OUTPATIENT)
Dept: CARDIOLOGY | Facility: CLINIC | Age: 66
End: 2021-04-06
Payer: MEDICARE

## 2021-04-06 PROBLEM — R94.39 ABNORMAL NUCLEAR STRESS TEST: Status: ACTIVE | Noted: 2021-04-06

## 2021-04-06 PROCEDURE — 93306 TTE W/DOPPLER COMPLETE: CPT

## 2021-04-06 PROCEDURE — 99072 ADDL SUPL MATRL&STAF TM PHE: CPT

## 2021-04-08 ENCOUNTER — NON-APPOINTMENT (OUTPATIENT)
Age: 66
End: 2021-04-08

## 2021-04-09 ENCOUNTER — APPOINTMENT (OUTPATIENT)
Dept: CT IMAGING | Facility: CLINIC | Age: 66
End: 2021-04-09
Payer: MEDICARE

## 2021-04-09 ENCOUNTER — OUTPATIENT (OUTPATIENT)
Dept: OUTPATIENT SERVICES | Facility: HOSPITAL | Age: 66
LOS: 1 days | End: 2021-04-09
Payer: MEDICARE

## 2021-04-09 DIAGNOSIS — S55.001A UNSPECIFIED INJURY OF ULNAR ARTERY AT FOREARM LEVEL, RIGHT ARM, INITIAL ENCOUNTER: Chronic | ICD-10-CM

## 2021-04-09 DIAGNOSIS — Z98.89 OTHER SPECIFIED POSTPROCEDURAL STATES: Chronic | ICD-10-CM

## 2021-04-09 DIAGNOSIS — Z95.810 PRESENCE OF AUTOMATIC (IMPLANTABLE) CARDIAC DEFIBRILLATOR: Chronic | ICD-10-CM

## 2021-04-09 DIAGNOSIS — Z00.8 ENCOUNTER FOR OTHER GENERAL EXAMINATION: ICD-10-CM

## 2021-04-09 PROCEDURE — 75574 CT ANGIO HRT W/3D IMAGE: CPT | Mod: 26

## 2021-04-09 PROCEDURE — 75574 CT ANGIO HRT W/3D IMAGE: CPT

## 2021-04-11 LAB
ALBUMIN SERPL ELPH-MCNC: 4.7 G/DL
ALP BLD-CCNC: 48 U/L
ALT SERPL-CCNC: 11 U/L
ANION GAP SERPL CALC-SCNC: 11 MMOL/L
AST SERPL-CCNC: 23 U/L
BASOPHILS # BLD AUTO: 0.02 K/UL
BASOPHILS NFR BLD AUTO: 0.9 %
BILIRUB SERPL-MCNC: 0.6 MG/DL
BUN SERPL-MCNC: 12 MG/DL
CALCIUM SERPL-MCNC: 10.7 MG/DL
CHLORIDE SERPL-SCNC: 102 MMOL/L
CO2 SERPL-SCNC: 27 MMOL/L
CREAT SERPL-MCNC: 0.97 MG/DL
EOSINOPHIL # BLD AUTO: 0.14 K/UL
EOSINOPHIL NFR BLD AUTO: 5.2 %
GLUCOSE SERPL-MCNC: 137 MG/DL
HCT VFR BLD CALC: 49.6 %
HGB BLD-MCNC: 16.5 G/DL
LYMPHOCYTES # BLD AUTO: 1.02 K/UL
LYMPHOCYTES NFR BLD AUTO: 37.4 %
MAN DIFF?: NORMAL
MCHC RBC-ENTMCNC: 31.1 PG
MCHC RBC-ENTMCNC: 33.3 GM/DL
MCV RBC AUTO: 93.4 FL
MONOCYTES # BLD AUTO: 0.42 K/UL
MONOCYTES NFR BLD AUTO: 15.6 %
NEUTROPHILS # BLD AUTO: 1.11 K/UL
NEUTROPHILS NFR BLD AUTO: 40.9 %
PLATELET # BLD AUTO: 164 K/UL
POTASSIUM SERPL-SCNC: 5.3 MMOL/L
PROT SERPL-MCNC: 8 G/DL
RBC # BLD: 5.31 M/UL
RBC # FLD: 14.1 %
SODIUM SERPL-SCNC: 140 MMOL/L
TSH SERPL-ACNC: 2.37 UIU/ML
WBC # FLD AUTO: 2.72 K/UL

## 2021-04-12 ENCOUNTER — APPOINTMENT (OUTPATIENT)
Dept: CT IMAGING | Facility: CLINIC | Age: 66
End: 2021-04-12

## 2021-04-22 ENCOUNTER — APPOINTMENT (OUTPATIENT)
Dept: ENDOCRINOLOGY | Facility: CLINIC | Age: 66
End: 2021-04-22
Payer: MEDICARE

## 2021-04-22 VITALS — SYSTOLIC BLOOD PRESSURE: 132 MMHG | DIASTOLIC BLOOD PRESSURE: 72 MMHG

## 2021-04-22 VITALS
TEMPERATURE: 98.2 F | HEART RATE: 76 BPM | HEIGHT: 66 IN | BODY MASS INDEX: 29.41 KG/M2 | WEIGHT: 183 LBS | OXYGEN SATURATION: 99 % | DIASTOLIC BLOOD PRESSURE: 82 MMHG | SYSTOLIC BLOOD PRESSURE: 144 MMHG

## 2021-04-22 PROCEDURE — 99214 OFFICE O/P EST MOD 30 MIN: CPT

## 2021-04-22 PROCEDURE — 83036 HEMOGLOBIN GLYCOSYLATED A1C: CPT | Mod: QW

## 2021-04-22 PROCEDURE — 99072 ADDL SUPL MATRL&STAF TM PHE: CPT

## 2021-04-22 PROCEDURE — 82962 GLUCOSE BLOOD TEST: CPT

## 2021-04-25 NOTE — HISTORY OF PRESENT ILLNESS
[FreeTextEntry1] : Mr. LO  is a 66  year old male who returns today for endocrine reevaluation.  Patient returns with regard to  a history of type 2 dm. He is taking Metformin Er 750 mg daily. Discontinued his Metformin due to abdominal cramping. A week ago restarted Metformin in the am instead of pm. HGM highest 146 after meals, low 100s before meals. Noted some burning in the feet in the past that has since subsided. \par POCT A1c returned today at 6.9% \par POCT glucose returned today at 123   mg/dL  \par Additional history includes that of htn  and hyperlipidemia along with cardiomyopathy, AICD and Pos for covid last March without residual sequelae. \par Denies any hx of microvascular complications.\par History of Vitamin d d deficiency.\par Wt gain over past 9 months -hurt back -couldn't’t exercise\par hx of laminectomy x 2\par Not active\par Optho neg\par takes vit d and c and three in one calc mg and zinc\par \par

## 2021-04-26 ENCOUNTER — APPOINTMENT (OUTPATIENT)
Dept: CARDIOLOGY | Facility: CLINIC | Age: 66
End: 2021-04-26
Payer: MEDICARE

## 2021-04-27 LAB — HBA1C MFR BLD HPLC: 6.9

## 2021-05-03 ENCOUNTER — APPOINTMENT (OUTPATIENT)
Dept: ULTRASOUND IMAGING | Facility: CLINIC | Age: 66
End: 2021-05-03
Payer: MEDICARE

## 2021-05-03 ENCOUNTER — OUTPATIENT (OUTPATIENT)
Dept: OUTPATIENT SERVICES | Facility: HOSPITAL | Age: 66
LOS: 1 days | End: 2021-05-03
Payer: MEDICARE

## 2021-05-03 DIAGNOSIS — Z98.89 OTHER SPECIFIED POSTPROCEDURAL STATES: Chronic | ICD-10-CM

## 2021-05-03 DIAGNOSIS — Z95.810 PRESENCE OF AUTOMATIC (IMPLANTABLE) CARDIAC DEFIBRILLATOR: Chronic | ICD-10-CM

## 2021-05-03 DIAGNOSIS — R10.11 RIGHT UPPER QUADRANT PAIN: ICD-10-CM

## 2021-05-03 DIAGNOSIS — Z00.8 ENCOUNTER FOR OTHER GENERAL EXAMINATION: ICD-10-CM

## 2021-05-03 DIAGNOSIS — S55.001A UNSPECIFIED INJURY OF ULNAR ARTERY AT FOREARM LEVEL, RIGHT ARM, INITIAL ENCOUNTER: Chronic | ICD-10-CM

## 2021-05-03 PROCEDURE — 76700 US EXAM ABDOM COMPLETE: CPT

## 2021-05-03 PROCEDURE — 76700 US EXAM ABDOM COMPLETE: CPT | Mod: 26

## 2021-05-06 DIAGNOSIS — R09.89 OTHER SPECIFIED SYMPTOMS AND SIGNS INVOLVING THE CIRCULATORY AND RESPIRATORY SYSTEMS: ICD-10-CM

## 2021-05-07 ENCOUNTER — APPOINTMENT (OUTPATIENT)
Dept: CARDIOLOGY | Facility: CLINIC | Age: 66
End: 2021-05-07
Payer: MEDICARE

## 2021-05-07 PROCEDURE — 93880 EXTRACRANIAL BILAT STUDY: CPT

## 2021-05-07 PROCEDURE — 93925 LOWER EXTREMITY STUDY: CPT

## 2021-05-07 PROCEDURE — 99072 ADDL SUPL MATRL&STAF TM PHE: CPT

## 2021-05-16 LAB — GLUCOSE BLDC GLUCOMTR-MCNC: 123

## 2021-05-24 ENCOUNTER — APPOINTMENT (OUTPATIENT)
Dept: RADIOLOGY | Facility: HOSPITAL | Age: 66
End: 2021-05-24
Payer: MEDICARE

## 2021-05-24 ENCOUNTER — OUTPATIENT (OUTPATIENT)
Dept: OUTPATIENT SERVICES | Facility: HOSPITAL | Age: 66
LOS: 1 days | End: 2021-05-24

## 2021-05-24 DIAGNOSIS — R10.11 RIGHT UPPER QUADRANT PAIN: ICD-10-CM

## 2021-05-24 DIAGNOSIS — Z95.810 PRESENCE OF AUTOMATIC (IMPLANTABLE) CARDIAC DEFIBRILLATOR: Chronic | ICD-10-CM

## 2021-05-24 DIAGNOSIS — S55.001A UNSPECIFIED INJURY OF ULNAR ARTERY AT FOREARM LEVEL, RIGHT ARM, INITIAL ENCOUNTER: Chronic | ICD-10-CM

## 2021-05-24 DIAGNOSIS — Z98.89 OTHER SPECIFIED POSTPROCEDURAL STATES: Chronic | ICD-10-CM

## 2021-05-24 PROCEDURE — 74240 X-RAY XM UPR GI TRC 1CNTRST: CPT | Mod: 26

## 2021-06-01 ENCOUNTER — NON-APPOINTMENT (OUTPATIENT)
Age: 66
End: 2021-06-01

## 2021-06-03 ENCOUNTER — APPOINTMENT (OUTPATIENT)
Dept: ELECTROPHYSIOLOGY | Facility: CLINIC | Age: 66
End: 2021-06-03

## 2021-06-08 ENCOUNTER — APPOINTMENT (OUTPATIENT)
Dept: ELECTROPHYSIOLOGY | Facility: CLINIC | Age: 66
End: 2021-06-08
Payer: MEDICARE

## 2021-06-08 ENCOUNTER — NON-APPOINTMENT (OUTPATIENT)
Age: 66
End: 2021-06-08

## 2021-06-08 PROCEDURE — 93296 REM INTERROG EVL PM/IDS: CPT

## 2021-06-08 PROCEDURE — 93295 DEV INTERROG REMOTE 1/2/MLT: CPT

## 2021-06-25 NOTE — DISCHARGE NOTE PROVIDER - NSDCCPCAREPLAN_GEN_ALL_CORE_FT
PRINCIPAL DISCHARGE DIAGNOSIS  Diagnosis: S/P ICD (internal cardiac defibrillator) procedure  Assessment and Plan of Treatment: Tantaline  You have had a procedure to insert a defribrillator. Once inside your body, this small electronic device helps keep your heart from beating too slowly. A pacemaker can't fix existing heart problems. But it can help you feel better and have more energy. As you recover, follow all of the instructions you are given, including those below. Do not exert yourself or perform strenous exercise until your first follow up visit.Don't raise your arm on the incision side above shoulder level or stretch your arm behind your back for as long as directed by your doctor. This gives the leads a chance to secure themselves inside your heart.Never put any creams, lotions, or products like peroxide on an incision unless your doctor tells you to. Do not get the incision wet until your doctor says it's OK.Every day, take your temperature and check your incision for signs of infection (redness, swelling, drainage, or warmth). Do this for 7 days or as advised by your doctor.Before you receive any treatment, tell all healthcare providers (including your dentist) that you have a pacemaker.You will be given an ID card . Always carry this card with you. You can show this card if your pacemaker sets off a metal detector. You should also show it to avoid screening with a hand-held security wand. Stelara Counseling:  I discussed with the patient the risks of ustekinumab including but not limited to immunosuppression, malignancy, posterior leukoencephalopathy syndrome, and serious infections.  The patient understands that monitoring is required including a PPD at baseline and must alert us or the primary physician if symptoms of infection or other concerning signs are noted.

## 2021-07-06 ENCOUNTER — LABORATORY RESULT (OUTPATIENT)
Age: 66
End: 2021-07-06

## 2021-07-06 ENCOUNTER — APPOINTMENT (OUTPATIENT)
Dept: CARDIOLOGY | Facility: CLINIC | Age: 66
End: 2021-07-06
Payer: MEDICARE

## 2021-07-06 VITALS
WEIGHT: 185 LBS | SYSTOLIC BLOOD PRESSURE: 132 MMHG | TEMPERATURE: 97.9 F | OXYGEN SATURATION: 96 % | DIASTOLIC BLOOD PRESSURE: 74 MMHG | HEART RATE: 84 BPM | HEIGHT: 66 IN | BODY MASS INDEX: 29.73 KG/M2

## 2021-07-06 DIAGNOSIS — R10.13 EPIGASTRIC PAIN: ICD-10-CM

## 2021-07-06 DIAGNOSIS — U07.1 COVID-19: ICD-10-CM

## 2021-07-06 DIAGNOSIS — Z12.5 ENCOUNTER FOR SCREENING FOR MALIGNANT NEOPLASM OF PROSTATE: ICD-10-CM

## 2021-07-06 DIAGNOSIS — R22.1 LOCALIZED SWELLING, MASS AND LUMP, NECK: ICD-10-CM

## 2021-07-06 DIAGNOSIS — R07.89 OTHER CHEST PAIN: ICD-10-CM

## 2021-07-06 PROCEDURE — 99214 OFFICE O/P EST MOD 30 MIN: CPT

## 2021-07-06 PROCEDURE — 99072 ADDL SUPL MATRL&STAF TM PHE: CPT

## 2021-07-06 NOTE — PHYSICAL EXAM
[Well Developed] : well developed [Well Nourished] : well nourished [No Acute Distress] : no acute distress [Normal Conjunctiva] : normal conjunctiva [Normal Venous Pressure] : normal venous pressure [No Carotid Bruit] : no carotid bruit [Normal S1, S2] : normal S1, S2 [No Murmur] : no murmur [No Rub] : no rub [No Gallop] : no gallop [Clear Lung Fields] : clear lung fields [Good Air Entry] : good air entry [No Respiratory Distress] : no respiratory distress  [Soft] : abdomen soft [Non Tender] : non-tender [No Masses/organomegaly] : no masses/organomegaly [Normal Bowel Sounds] : normal bowel sounds [Normal Gait] : normal gait [No Edema] : no edema [No Cyanosis] : no cyanosis [No Clubbing] : no clubbing [No Varicosities] : no varicosities [No Rash] : no rash [No Skin Lesions] : no skin lesions [Moves all extremities] : moves all extremities [No Focal Deficits] : no focal deficits [Normal Speech] : normal speech [Alert and Oriented] : alert and oriented [Normal memory] : normal memory [de-identified] : fatty prominence bilateral neck area left greater then right

## 2021-07-06 NOTE — HISTORY OF PRESENT ILLNESS
[FreeTextEntry1] : This is a 66 year old gentlemen with a PMH of CAD,  cardiomyopathy, hyperlipidemia, hypertension, AICD, DM, lung Nodules, Adrenal Nodule, proteinuria, VitamiN D Deficiency, Dilated Aorta, and Neutropenia presents today for follow up. Patient states that he is feeling good and has no cardiac complaints at this time. Patient has noted some swelling at the base of his neck, he states that the swelling varies regarding which side is worse. Patient is unsure if this is related to the heat. Patient denies dyspnea, palpitations, chest pain, nausea, vomiting, dizziness and lightheadedness.\par

## 2021-07-08 ENCOUNTER — OUTPATIENT (OUTPATIENT)
Dept: OUTPATIENT SERVICES | Facility: HOSPITAL | Age: 66
LOS: 1 days | End: 2021-07-08
Payer: MEDICARE

## 2021-07-08 ENCOUNTER — APPOINTMENT (OUTPATIENT)
Dept: ULTRASOUND IMAGING | Facility: CLINIC | Age: 66
End: 2021-07-08
Payer: MEDICARE

## 2021-07-08 DIAGNOSIS — R22.1 LOCALIZED SWELLING, MASS AND LUMP, NECK: ICD-10-CM

## 2021-07-08 DIAGNOSIS — Z95.810 PRESENCE OF AUTOMATIC (IMPLANTABLE) CARDIAC DEFIBRILLATOR: Chronic | ICD-10-CM

## 2021-07-08 DIAGNOSIS — S55.001A UNSPECIFIED INJURY OF ULNAR ARTERY AT FOREARM LEVEL, RIGHT ARM, INITIAL ENCOUNTER: Chronic | ICD-10-CM

## 2021-07-08 DIAGNOSIS — Z98.89 OTHER SPECIFIED POSTPROCEDURAL STATES: Chronic | ICD-10-CM

## 2021-07-08 PROCEDURE — 76536 US EXAM OF HEAD AND NECK: CPT | Mod: 26

## 2021-07-08 PROCEDURE — 76536 US EXAM OF HEAD AND NECK: CPT

## 2021-07-10 LAB
25(OH)D3 SERPL-MCNC: 44.9 NG/ML
ALBUMIN SERPL ELPH-MCNC: 4.5 G/DL
ALP BLD-CCNC: 47 U/L
ALT SERPL-CCNC: 16 U/L
ANION GAP SERPL CALC-SCNC: 11 MMOL/L
ANION GAP SERPL CALC-SCNC: 13 MMOL/L
AST SERPL-CCNC: 25 U/L
BASOPHILS # BLD AUTO: 0 K/UL
BASOPHILS NFR BLD AUTO: 0 %
BILIRUB DIRECT SERPL-MCNC: 0.1 MG/DL
BILIRUB INDIRECT SERPL-MCNC: 0.5 MG/DL
BILIRUB SERPL-MCNC: 0.6 MG/DL
BUN SERPL-MCNC: 16 MG/DL
BUN SERPL-MCNC: 16 MG/DL
CALCIUM SERPL-MCNC: 10 MG/DL
CALCIUM SERPL-MCNC: 10.2 MG/DL
CHLORIDE SERPL-SCNC: 103 MMOL/L
CHLORIDE SERPL-SCNC: 104 MMOL/L
CHOLEST SERPL-MCNC: 173 MG/DL
CK SERPL-CCNC: 208 U/L
CO2 SERPL-SCNC: 21 MMOL/L
CO2 SERPL-SCNC: 26 MMOL/L
COVID-19 NUCLEOCAPSID  GAM ANTIBODY INTERPRETATION: POSITIVE
CREAT SERPL-MCNC: 0.94 MG/DL
CREAT SERPL-MCNC: 0.98 MG/DL
EOSINOPHIL # BLD AUTO: 0.07 K/UL
EOSINOPHIL NFR BLD AUTO: 2.6 %
GLUCOSE SERPL-MCNC: 126 MG/DL
GLUCOSE SERPL-MCNC: 158 MG/DL
HCT VFR BLD CALC: 49.8 %
HDLC SERPL-MCNC: 58 MG/DL
HGB BLD-MCNC: 16.7 G/DL
LDLC SERPL CALC-MCNC: 102 MG/DL
LYMPHOCYTES # BLD AUTO: 1.09 K/UL
LYMPHOCYTES NFR BLD AUTO: 38.3 %
MAN DIFF?: NORMAL
MCHC RBC-ENTMCNC: 31.4 PG
MCHC RBC-ENTMCNC: 33.5 GM/DL
MCV RBC AUTO: 93.6 FL
MONOCYTES # BLD AUTO: 0.59 K/UL
MONOCYTES NFR BLD AUTO: 20.9 %
NEUTROPHILS # BLD AUTO: 0.94 K/UL
NEUTROPHILS NFR BLD AUTO: 33 %
NONHDLC SERPL-MCNC: 116 MG/DL
PLATELET # BLD AUTO: 165 K/UL
POTASSIUM SERPL-SCNC: 4.6 MMOL/L
POTASSIUM SERPL-SCNC: 4.8 MMOL/L
PROT SERPL-MCNC: 7.7 G/DL
RBC # BLD: 5.32 M/UL
RBC # FLD: 14.2 %
SARS-COV-2 AB SERPL QL IA: 68.9 INDEX
SODIUM SERPL-SCNC: 137 MMOL/L
SODIUM SERPL-SCNC: 141 MMOL/L
TRIGL SERPL-MCNC: 70 MG/DL
WBC # FLD AUTO: 2.84 K/UL

## 2021-07-17 NOTE — PROVIDER CONTACT NOTE (CRITICAL VALUE NOTIFICATION) - DATE AND TIME:
24-Jul-2019 19:27 [facility] Hospitalist Progress Note     PCP: Geoffrey Robertson MD    Chief Complaint: follow-up    Overnight/Interim Events:  Lost IV, difficulty c pain o/n, better now     SUBJECTIVE:  Laying in bed, doing ok c liquids. No n/v. On/off pain.      OBJECTIVE:  Temp:  [ in the last 168 hours.       Meds:     • Heparin Sodium (Porcine)  5,000 Units Subcutaneous [facility]   • predniSONE  20 mg Oral Daily   • amLODIPine Besylate  10 mg Oral Daily   • Pantoprazole Sodium  40 mg Oral QAM AC     • sodium chloride 100 mL/hr at 07/1 24-Jul-2019 19:28

## 2021-07-22 ENCOUNTER — APPOINTMENT (OUTPATIENT)
Dept: CT IMAGING | Facility: CLINIC | Age: 66
End: 2021-07-22
Payer: MEDICARE

## 2021-07-22 ENCOUNTER — OUTPATIENT (OUTPATIENT)
Dept: OUTPATIENT SERVICES | Facility: HOSPITAL | Age: 66
LOS: 1 days | End: 2021-07-22
Payer: MEDICARE

## 2021-07-22 DIAGNOSIS — Z98.89 OTHER SPECIFIED POSTPROCEDURAL STATES: Chronic | ICD-10-CM

## 2021-07-22 DIAGNOSIS — R22.1 LOCALIZED SWELLING, MASS AND LUMP, NECK: ICD-10-CM

## 2021-07-22 DIAGNOSIS — S55.001A UNSPECIFIED INJURY OF ULNAR ARTERY AT FOREARM LEVEL, RIGHT ARM, INITIAL ENCOUNTER: Chronic | ICD-10-CM

## 2021-07-22 DIAGNOSIS — Z00.8 ENCOUNTER FOR OTHER GENERAL EXAMINATION: ICD-10-CM

## 2021-07-22 DIAGNOSIS — Z95.810 PRESENCE OF AUTOMATIC (IMPLANTABLE) CARDIAC DEFIBRILLATOR: Chronic | ICD-10-CM

## 2021-07-22 PROCEDURE — 70491 CT SOFT TISSUE NECK W/DYE: CPT | Mod: 26

## 2021-07-22 PROCEDURE — 70491 CT SOFT TISSUE NECK W/DYE: CPT

## 2021-08-09 ENCOUNTER — NON-APPOINTMENT (OUTPATIENT)
Age: 66
End: 2021-08-09

## 2021-08-22 ENCOUNTER — RX RENEWAL (OUTPATIENT)
Age: 66
End: 2021-08-22

## 2021-08-25 ENCOUNTER — APPOINTMENT (OUTPATIENT)
Dept: ORTHOPEDIC SURGERY | Facility: CLINIC | Age: 66
End: 2021-08-25
Payer: OTHER MISCELLANEOUS

## 2021-08-25 ENCOUNTER — NON-APPOINTMENT (OUTPATIENT)
Age: 66
End: 2021-08-25

## 2021-08-25 VITALS
BODY MASS INDEX: 28.77 KG/M2 | HEART RATE: 76 BPM | WEIGHT: 179 LBS | SYSTOLIC BLOOD PRESSURE: 159 MMHG | DIASTOLIC BLOOD PRESSURE: 89 MMHG | HEIGHT: 66 IN | TEMPERATURE: 98.5 F

## 2021-08-25 PROCEDURE — 99072 ADDL SUPL MATRL&STAF TM PHE: CPT

## 2021-08-25 PROCEDURE — 72110 X-RAY EXAM L-2 SPINE 4/>VWS: CPT

## 2021-08-25 PROCEDURE — 72170 X-RAY EXAM OF PELVIS: CPT | Mod: 59

## 2021-08-25 PROCEDURE — 96372 THER/PROPH/DIAG INJ SC/IM: CPT

## 2021-08-25 PROCEDURE — 99214 OFFICE O/P EST MOD 30 MIN: CPT | Mod: 25

## 2021-08-25 RX ORDER — KETOROLAC TROMETHAMINE 30 MG/ML
30 INJECTION, SOLUTION INTRAMUSCULAR; INTRAVENOUS
Qty: 1 | Refills: 0 | Status: COMPLETED | OUTPATIENT
Start: 2021-08-25

## 2021-08-25 RX ADMIN — KETOROLAC TROMETHAMINE 0 MG/ML: 30 INJECTION, SOLUTION INTRAMUSCULAR; INTRAVENOUS at 00:00

## 2021-08-25 NOTE — REASON FOR VISIT
[Follow-Up Visit] : a follow-up visit for [FreeTextEntry2] : Right back pain radiating to rt lower extremity WC DOI 9/21/1998

## 2021-08-25 NOTE — DISCUSSION/SUMMARY
[Medication Risks Reviewed] : Medication risks reviewed [de-identified] : The patient today presents symptoms of lumbar radiculopathy likely in the setting of disc herniation.  He has a prior laminectomy and when compared to prior x-rays there is appears to be more instability at the L4-5 level with increased flexion noted at that level.  Prescribed him gabapentin and meloxicam.  Offered him an injection of Toradol for his pain and he wanted to proceed.  Under sterile conditions 30 mg of Toradol was administered intramuscularly by the LPN without incident.  He will check with his cardiologist regarding the possibility of an MRI given his defibrillator pacemaker implanted.  An MRI is not possible a CT scan would be recommended to assess for an acute disc herniation.\par \par The patient was educated regarding their condition, treatment options as well as prescribed course of treatment. \par Risks and benefits as well as alternatives to the proposed treatment were also provided to the patient \par They were given the opportunity to have all their questions answered to their satisfaction.\par \par Vital signs were reviewed with the patient and the patient was instructed to followup with their primary care provider for further management.\par \par Healthy lifestyle recommendations were also made including a tobacco free lifestyle, proper diet, and weight control.

## 2021-08-25 NOTE — PHYSICAL EXAM
[Stooped] : stooped [Cane] : ambulates with cane [SLR] : positive straight leg raise [] : Motor: [NL] : Motor strength of the left lower extremity was normal [___/5] : right ([unfilled]/5) [Motor Strength Lower Extremities] : right (5/5) [LE] : Sensory: Intact in bilateral lower extremities [1+] : right patella 1+ [2+] : left ankle jerk 2+ [DP] : dorsalis pedis 2+ and symmetric bilaterally [PT] : posterior tibial 2+ and symmetric bilaterally [de-identified] : 4 views lumbar spine obtained today demonstrate no significant scoliosis.  Straightening of lumbar lordosis.  Bridging anterior osteophyte seen at the L4-5 level.  When compared with prior x-rays there is increased flexion at this L4-5 level suggesting possible instability and possible disc extrusion at this level.  No acute fractures.  No significant dynamic instability between\par \par AP pelvis demonstrates normal appearance of the hips bilaterally.  No acute fractures.  No significant degeneration. [Poor Appearance] : well-appearing [Acute Distress] : not in acute distress [Obese] : not obese [Abl Mood] : in a normal mood [Abl Affect] : with normal affect [Poor Coordination] : normal coordination [Disorientation] : oriented x 3 [Plantar Reflex Right Only] : absent on the right [Plantar Reflex Left Only] : absent on the left [DTR Reflexes Clonus Of Right Ankle (___ Beats)] : absent on the right [DTR Reflexes Clonus Of Left Ankle (___ Beats)] : absent on the left [FreeTextEntry2] : The pt is awake, alert and oriented to self, place and time, is comfortable but in no acute distress. Gait examination reveals a narrow based, non-ataxic, non-antalgic gait. Can heel and toe walk without difficulty. Inspection of neck, back and lower extremities bilaterally reveals no rashes or ecchymotic lesions.  There is no obvious abnormal spinal curvature in the sagittal and coronal planes. There is tenderness over the lumbar spine in the midline as well as in the right paraspinal musculature more than the left. There is no sacroiliac tenderness. No greater trochanteric tenderness bilaterally. No atrophy or abnormal movements noted in the upper or lower extremities. There is no swelling noted in the upper or lower extremities bilaterally. No cervical lymphadenopathy noted anteriorly.\par Lumbar spine range of motion is restricted with forward flexion of 40° and extension of 25° with extension more painful than flexion.  \par Negative straight leg raise to 45° in the supine position bilaterally with primarily low back pain noted. There is no groin pain with hip internal rotation and a negative JAMIL test bilaterally.  [de-identified] : well-healed lumbar midline incision from prior surgery

## 2021-08-25 NOTE — HISTORY OF PRESENT ILLNESS
[de-identified] : Patient here today for exacerbation of low back pain for 1 week this is a flare up from a w/c injury DOI 9/21/1998. Patient states pain level is 9/10 and worsens with movement currently using a cane for support has been taking Voltaren and applying Lido patches with little to no relief. patient also complaints of muscle spasm all through right lower extremity radiating to right foot and toes.\par Pain primarily right gluteus muscle, thigh and calf. No left leg or significant low back pain.

## 2021-09-08 ENCOUNTER — APPOINTMENT (OUTPATIENT)
Dept: ELECTROPHYSIOLOGY | Facility: CLINIC | Age: 66
End: 2021-09-08
Payer: MEDICARE

## 2021-09-08 ENCOUNTER — NON-APPOINTMENT (OUTPATIENT)
Age: 66
End: 2021-09-08

## 2021-09-08 PROCEDURE — 93296 REM INTERROG EVL PM/IDS: CPT

## 2021-09-08 PROCEDURE — 93295 DEV INTERROG REMOTE 1/2/MLT: CPT

## 2021-09-22 ENCOUNTER — OUTPATIENT (OUTPATIENT)
Dept: OUTPATIENT SERVICES | Facility: HOSPITAL | Age: 66
LOS: 1 days | End: 2021-09-22
Payer: COMMERCIAL

## 2021-09-22 ENCOUNTER — RESULT REVIEW (OUTPATIENT)
Age: 66
End: 2021-09-22

## 2021-09-22 ENCOUNTER — APPOINTMENT (OUTPATIENT)
Dept: CT IMAGING | Facility: CLINIC | Age: 66
End: 2021-09-22
Payer: OTHER MISCELLANEOUS

## 2021-09-22 DIAGNOSIS — S55.001A UNSPECIFIED INJURY OF ULNAR ARTERY AT FOREARM LEVEL, RIGHT ARM, INITIAL ENCOUNTER: Chronic | ICD-10-CM

## 2021-09-22 DIAGNOSIS — M51.26 OTHER INTERVERTEBRAL DISC DISPLACEMENT, LUMBAR REGION: ICD-10-CM

## 2021-09-22 DIAGNOSIS — Z95.810 PRESENCE OF AUTOMATIC (IMPLANTABLE) CARDIAC DEFIBRILLATOR: Chronic | ICD-10-CM

## 2021-09-22 DIAGNOSIS — Z98.89 OTHER SPECIFIED POSTPROCEDURAL STATES: Chronic | ICD-10-CM

## 2021-09-22 PROCEDURE — 72131 CT LUMBAR SPINE W/O DYE: CPT | Mod: 26

## 2021-09-22 PROCEDURE — 72131 CT LUMBAR SPINE W/O DYE: CPT

## 2021-09-22 PROCEDURE — 76376 3D RENDER W/INTRP POSTPROCES: CPT

## 2021-09-22 PROCEDURE — 76376 3D RENDER W/INTRP POSTPROCES: CPT | Mod: 26

## 2021-09-23 ENCOUNTER — NON-APPOINTMENT (OUTPATIENT)
Age: 66
End: 2021-09-23

## 2021-10-06 ENCOUNTER — APPOINTMENT (OUTPATIENT)
Dept: ORTHOPEDIC SURGERY | Facility: CLINIC | Age: 66
End: 2021-10-06
Payer: OTHER MISCELLANEOUS

## 2021-10-06 VITALS
SYSTOLIC BLOOD PRESSURE: 150 MMHG | BODY MASS INDEX: 28.61 KG/M2 | HEART RATE: 82 BPM | DIASTOLIC BLOOD PRESSURE: 88 MMHG | HEIGHT: 66 IN | WEIGHT: 178 LBS

## 2021-10-06 PROCEDURE — 99214 OFFICE O/P EST MOD 30 MIN: CPT

## 2021-10-06 PROCEDURE — 99072 ADDL SUPL MATRL&STAF TM PHE: CPT

## 2021-10-06 NOTE — HISTORY OF PRESENT ILLNESS
[Worsening] : worsening [8] : a current pain level of 8/10 [Constant] : ~He/She~ states the symptoms seem to be constant [Bending] : worsened by bending [Prolonged Sitting] : worsened by prolonged sitting [Prolonged Standing] : worsened by prolonged standing [de-identified] : Patient presents here today for reevaluation of low back pain and CT scan of lumbar spine review. Patient reports current pain level is 8/10 on his low back and radiates to left side. Patient reports pain on the left side low back is worsening and is exacerbated by laying down ,weather changes and at night. [de-identified] : weather changes  and laying down [de-identified] : Voltaren cream, Tylenol ES as needed

## 2021-10-06 NOTE — DISCUSSION/SUMMARY
[Medication Risks Reviewed] : Medication risks reviewed [de-identified] : The patient reports that since his last visit her right leg symptoms have improved with the use of meloxicam and gabapentin which he takes intermittently.  He has pain in the left side with significant left calf pain symptoms.  CT scan obtained recently does demonstrate significant disc degeneration at L4-5 with calcified disc protrusion and left more than right foraminal stenosis especially at the L5-S1.  We discussed the option of lumbar epidural steroid injection left-sided L4 and L5 and he will consider that if symptoms persist.  He will continue some meloxicam as needed.  Prescribed him gabapentin to take as well 300 mg nightly at this time.  A new prescription of physical therapy was also provided today.\par \par The patient was educated regarding their condition, treatment options as well as prescribed course of treatment. \par Risks and benefits as well as alternatives to the proposed treatment were also provided to the patient \par They were given the opportunity to have all their questions answered to their satisfaction.\par \par Vital signs were reviewed with the patient and the patient was instructed to followup with their primary care provider for further management.\par \par Healthy lifestyle recommendations were also made including a tobacco free lifestyle, proper diet, and weight control.

## 2021-10-06 NOTE — REASON FOR VISIT
[Follow-Up Visit] : a follow-up visit for [Degenerative Joint Disease] : degenerative joint disease [Herniated Discs] : herniated discs [Back Pain] : back pain [FreeTextEntry2] : Low back pain  DOI 9/21/1988, not working retired. CT scan of lumbar spine done 9/22/21 for review today.

## 2021-10-06 NOTE — PHYSICAL EXAM
[Stooped] : stooped [Cane] : ambulates with cane [SLR] : positive straight leg raise [] : Motor: [NL] : Motor strength of the left lower extremity was normal [___/5] : right ([unfilled]/5) [Motor Strength Lower Extremities] : right (5/5) [LE] : Sensory: Intact in bilateral lower extremities [1+] : right patella 1+ [2+] : left ankle jerk 2+ [DP] : dorsalis pedis 2+ and symmetric bilaterally [PT] : posterior tibial 2+ and symmetric bilaterally [de-identified] : EXAM: CT 3D RECONSTRUCT FARIDA DUNHAM\par \par EXAM: CT LUMBAR SPINE\par \par PROCEDURE DATE: 09/22/2021\par \par INTERPRETATION: HISTORY: Lower back pain radiating down the leg.\par \par Helical CT imaging of the lumbar spine was performed without intravenous contrast. Sagittal and coronal reformats were provided. 3-D reformats were performed on a separate workstation.\par \par Prior Studies: CT of the lumbar spine from March 12, 2021 and CT of the lumbar spine from May 31, 2012.\par \par FINDINGS:\par \par SEGMENTATION: There are rudimentary ribs/articulating transverse processes of the level of L1. This is in keeping with the nomenclature on the prior CT.\par \par ALIGNMENT: There is slight levoscoliosis. Straightening of the lumbar lordosis. There is trace retrolisthesis of L2 on L3 and L5 on S1. Findings are grossly unchanged.\par \par VERTEBRAL BODIES AND OSSEOUS STRUCTURES: There is no acute compression deformity. Areas of rounded osseous lucency are again seen within the posterior iliac bones bilaterally, within the right hemisacrum, and within the partially imaged T11 vertebral body. These are grossly unchanged dating back to the 2012 examination and are of nonspecific etiology, correlate with clinical history. There is no evidence of acute fracture. Patient is again noted to be status post right hemilaminectomy and right-sided facetectomy at L4/L5 and L5/S1.\par \par DISC SPACES: There is mild to moderate disc height loss at L4/L5. There is mild mineralization within the L4/L5 and to a lesser extent L5/S1 disc space.\par \par SACROILIAC JOINTS: There is bilateral sacroiliac joint arthrosis with bridging osteophyte formation anteriorly.\par \par IMAGED ABDOMINAL AND PELVIC STRUCTURES: There is an unchanged left adrenal nodule dating back to 2012.\par \par The findings at the individual levels are as follows:\par \par T11/T12:There is bilateral facet arthrosis. There is no spinal canal or neural foraminal narrowing. Is grossly unchanged.\par \par T12/L1: There is no spinal canal or neural foraminal narrowing.\par \par L1/2: There is a minimal disc bulge with mild bilateral facet arthrosis. There is minimal bilateral neural foraminal narrowing. There is no central canal narrowing. Findings are grossly unchanged.\par \par L2/3: There is minimal disc bulge mild bilateral facet arthrosis. There is mild bilateral neural foraminal narrowing. There is no central canal narrowing. Findings are grossly unchanged.\par \par L3/4: There is a mild diffuse disc bulge bilateral facet arthrosis. There is mild to moderate bilateral neural foraminal narrowing. There is flattening the ventral thecal sac. Findings appear grossly unchanged.\par \par L4/5: There is a diffuse mineralized disc bulge which is asymmetric to the right with a superimposed right foraminal disc protrusion. There is posterior osseous ridging which is asymmetric to the right. There is left-sided facet arthrosis. There is effacement of the right lateral recess and flattening of the ventral thecal sac. There is severe right and moderate left neural foraminal narrowing. Findings appear grossly similar compared to the prior examination.\par \par L5/S1: There is a diffuse disc bulge with posterior osseous ridging. There is left-sided facet arthrosis. There is a superimposed broad-based right lateral recess/foraminal disc protrusion. There is posterior osseous ridging. Findings result in effacement of the right lateral recess. There is flattening of the ventral thecal sac. There is moderate to severe bilateral neural foraminal narrowing. Findings appear grossly unchanged.\par \par IMPRESSION:\par \par Grossly unchanged multilevel lumbar spondylosis as outlined above.\par \par --- End of Report ---\par \par ANITA VARGAS MD; Attending Radiologist\par This document has been electronically signed. Sep 28 2021 10:00AM [Poor Appearance] : well-appearing [Acute Distress] : not in acute distress [Obese] : not obese [Abl Mood] : in a normal mood [Abl Affect] : with normal affect [Poor Coordination] : normal coordination [Disorientation] : oriented x 3 [Plantar Reflex Right Only] : absent on the right [Plantar Reflex Left Only] : absent on the left [DTR Reflexes Clonus Of Right Ankle (___ Beats)] : absent on the right [DTR Reflexes Clonus Of Left Ankle (___ Beats)] : absent on the left [FreeTextEntry2] : The pt is awake, alert and oriented to self, place and time, is comfortable but in no acute distress. Gait examination reveals a narrow based, non-ataxic, non-antalgic gait. Can heel and toe walk without difficulty. Inspection of neck, back and lower extremities bilaterally reveals no rashes or ecchymotic lesions.  There is no obvious abnormal spinal curvature in the sagittal and coronal planes. There is tenderness over the lumbar spine in the midline as well as in the right paraspinal musculature more than the left. There is no sacroiliac tenderness. No greater trochanteric tenderness bilaterally. No atrophy or abnormal movements noted in the upper or lower extremities. There is no swelling noted in the upper or lower extremities bilaterally. No cervical lymphadenopathy noted anteriorly.\par Lumbar spine range of motion is restricted with forward flexion of 40° and extension of 25° with extension more painful than flexion.  \par Negative straight leg raise to 45° in the supine position bilaterally with primarily low back pain noted. There is no groin pain with hip internal rotation and a negative JAMIL test bilaterally.  [de-identified] : well-healed lumbar midline incision from prior surgery

## 2021-10-07 ENCOUNTER — APPOINTMENT (OUTPATIENT)
Dept: ENDOCRINOLOGY | Facility: CLINIC | Age: 66
End: 2021-10-07
Payer: MEDICARE

## 2021-10-07 VITALS
SYSTOLIC BLOOD PRESSURE: 140 MMHG | OXYGEN SATURATION: 98 % | DIASTOLIC BLOOD PRESSURE: 89 MMHG | WEIGHT: 181 LBS | BODY MASS INDEX: 29.21 KG/M2 | HEART RATE: 71 BPM

## 2021-10-07 VITALS — SYSTOLIC BLOOD PRESSURE: 134 MMHG | DIASTOLIC BLOOD PRESSURE: 78 MMHG

## 2021-10-07 LAB
HBA1C MFR BLD HPLC: 6.6
POCT GLUC: 134

## 2021-10-07 PROCEDURE — 90662 IIV NO PRSV INCREASED AG IM: CPT

## 2021-10-07 PROCEDURE — 99214 OFFICE O/P EST MOD 30 MIN: CPT | Mod: 25

## 2021-10-07 PROCEDURE — 82962 GLUCOSE BLOOD TEST: CPT

## 2021-10-07 PROCEDURE — 96372 THER/PROPH/DIAG INJ SC/IM: CPT

## 2021-10-07 PROCEDURE — 83036 HEMOGLOBIN GLYCOSYLATED A1C: CPT | Mod: QW

## 2021-10-07 PROCEDURE — G0008: CPT

## 2021-10-07 NOTE — PHYSICAL EXAM
[Alert] : alert [Well Nourished] : well nourished [No Acute Distress] : no acute distress [Well Developed] : well developed [Normal Sclera/Conjunctiva] : normal sclera/conjunctiva [EOMI] : extra ocular movement intact [No Proptosis] : no proptosis [Normal Oropharynx] : the oropharynx was normal [Thyroid Not Enlarged] : the thyroid was not enlarged [No Thyroid Nodules] : no palpable thyroid nodules [No Respiratory Distress] : no respiratory distress [No Accessory Muscle Use] : no accessory muscle use [Clear to Auscultation] : lungs were clear to auscultation bilaterally [Normal S1, S2] : normal S1 and S2 [Normal Rate] : heart rate was normal [Regular Rhythm] : with a regular rhythm [No Edema] : no peripheral edema [Pedal Pulses Normal] : the pedal pulses are present [Normal Bowel Sounds] : normal bowel sounds [Not Tender] : non-tender [Not Distended] : not distended [Soft] : abdomen soft [Normal Anterior Cervical Nodes] : no anterior cervical lymphadenopathy [Normal Posterior Cervical Nodes] : no posterior cervical lymphadenopathy [No Spinal Tenderness] : no spinal tenderness [Spine Straight] : spine straight [No Stigmata of Cushings Syndrome] : no stigmata of Cushings Syndrome [Normal Strength/Tone] : muscle strength and tone were normal [Normal Gait] : normal gait [No Rash] : no rash [Normal Reflexes] : deep tendon reflexes were 2+ and symmetric [No Tremors] : no tremors [Oriented x3] : oriented to person, place, and time [Acanthosis Nigricans] : no acanthosis nigricans

## 2021-10-07 NOTE — HISTORY OF PRESENT ILLNESS
[FreeTextEntry1] : Mr. LO  is a 66  year old male who returns today for endocrine reevaluation. He  returns with regard to  a history of type 2 dm. He is taking Metformin Er 750 mg daily. HGM has not been tested regularly. When he did test he saw in the mid 100s.  Denies any hx of microvascular complications. Optho neg. Denies any neurologic symptoms. \par POCT A1c returned today at 6.6%  ; previously 6.9% from\par POCT glucose returned today at  134  mg/dL \par Additional history includes that of htn  and hyperlipidemia along with cardiomyopathy, AICD and Pos for covid last March without residual sequelae. \par History of Vitamin d d deficiency.\par Wt gain over past 9 months -hurt back -couldn't’t exercise\par hx of laminectomy x 2\par Not active\par \par takes vit d and c and three in one calc mg and zinc\par Has been experiencing lower back pain caused by sciatica. Currently in physical therapy

## 2021-10-27 ENCOUNTER — OUTPATIENT (OUTPATIENT)
Dept: OUTPATIENT SERVICES | Facility: HOSPITAL | Age: 66
LOS: 1 days | End: 2021-10-27
Payer: COMMERCIAL

## 2021-10-27 DIAGNOSIS — Z95.810 PRESENCE OF AUTOMATIC (IMPLANTABLE) CARDIAC DEFIBRILLATOR: Chronic | ICD-10-CM

## 2021-10-27 DIAGNOSIS — S55.001A UNSPECIFIED INJURY OF ULNAR ARTERY AT FOREARM LEVEL, RIGHT ARM, INITIAL ENCOUNTER: Chronic | ICD-10-CM

## 2021-10-27 DIAGNOSIS — Z20.828 CONTACT WITH AND (SUSPECTED) EXPOSURE TO OTHER VIRAL COMMUNICABLE DISEASES: ICD-10-CM

## 2021-10-27 DIAGNOSIS — Z98.89 OTHER SPECIFIED POSTPROCEDURAL STATES: Chronic | ICD-10-CM

## 2021-10-27 LAB — SARS-COV-2 RNA SPEC QL NAA+PROBE: SIGNIFICANT CHANGE UP

## 2021-10-27 PROCEDURE — U0003: CPT

## 2021-10-27 PROCEDURE — U0005: CPT

## 2021-10-29 ENCOUNTER — RESULT REVIEW (OUTPATIENT)
Age: 66
End: 2021-10-29

## 2021-10-29 ENCOUNTER — APPOINTMENT (OUTPATIENT)
Dept: ORTHOPEDIC SURGERY | Facility: HOSPITAL | Age: 66
End: 2021-10-29

## 2021-10-29 ENCOUNTER — OUTPATIENT (OUTPATIENT)
Dept: OUTPATIENT SERVICES | Facility: HOSPITAL | Age: 66
LOS: 1 days | End: 2021-10-29
Payer: COMMERCIAL

## 2021-10-29 DIAGNOSIS — M51.36 OTHER INTERVERTEBRAL DISC DEGENERATION, LUMBAR REGION: ICD-10-CM

## 2021-10-29 DIAGNOSIS — Z95.810 PRESENCE OF AUTOMATIC (IMPLANTABLE) CARDIAC DEFIBRILLATOR: Chronic | ICD-10-CM

## 2021-10-29 DIAGNOSIS — Z98.89 OTHER SPECIFIED POSTPROCEDURAL STATES: Chronic | ICD-10-CM

## 2021-10-29 DIAGNOSIS — S55.001A UNSPECIFIED INJURY OF ULNAR ARTERY AT FOREARM LEVEL, RIGHT ARM, INITIAL ENCOUNTER: Chronic | ICD-10-CM

## 2021-10-29 PROCEDURE — 64483 NJX AA&/STRD TFRM EPI L/S 1: CPT | Mod: LT

## 2021-10-29 PROCEDURE — 64484 NJX AA&/STRD TFRM EPI L/S EA: CPT

## 2021-10-29 PROCEDURE — 64483 NJX AA&/STRD TFRM EPI L/S 1: CPT

## 2021-10-29 PROCEDURE — 64484 NJX AA&/STRD TFRM EPI L/S EA: CPT | Mod: LT

## 2021-12-07 ENCOUNTER — APPOINTMENT (OUTPATIENT)
Dept: ELECTROPHYSIOLOGY | Facility: CLINIC | Age: 66
End: 2021-12-07
Payer: MEDICARE

## 2021-12-07 ENCOUNTER — NON-APPOINTMENT (OUTPATIENT)
Age: 66
End: 2021-12-07

## 2021-12-07 VITALS — DIASTOLIC BLOOD PRESSURE: 92 MMHG | OXYGEN SATURATION: 96 % | SYSTOLIC BLOOD PRESSURE: 151 MMHG | HEART RATE: 83 BPM

## 2021-12-07 PROCEDURE — 93000 ELECTROCARDIOGRAM COMPLETE: CPT | Mod: 59

## 2021-12-07 PROCEDURE — 93284 PRGRMG EVAL IMPLANTABLE DFB: CPT

## 2021-12-10 ENCOUNTER — NON-APPOINTMENT (OUTPATIENT)
Age: 66
End: 2021-12-10

## 2021-12-23 ENCOUNTER — APPOINTMENT (OUTPATIENT)
Dept: CARDIOLOGY | Facility: CLINIC | Age: 66
End: 2021-12-23
Payer: MEDICARE

## 2021-12-23 ENCOUNTER — NON-APPOINTMENT (OUTPATIENT)
Age: 66
End: 2021-12-23

## 2021-12-23 VITALS
SYSTOLIC BLOOD PRESSURE: 121 MMHG | WEIGHT: 179 LBS | OXYGEN SATURATION: 86 % | HEIGHT: 66 IN | TEMPERATURE: 97.8 F | BODY MASS INDEX: 28.77 KG/M2 | DIASTOLIC BLOOD PRESSURE: 80 MMHG | HEART RATE: 69 BPM

## 2021-12-23 DIAGNOSIS — Z71.85 ENCOUNTER FOR IMMUNIZATION SAFETY COUNSELING: ICD-10-CM

## 2021-12-23 PROCEDURE — 93000 ELECTROCARDIOGRAM COMPLETE: CPT

## 2021-12-23 PROCEDURE — 99213 OFFICE O/P EST LOW 20 MIN: CPT

## 2021-12-23 NOTE — HISTORY OF PRESENT ILLNESS
[FreeTextEntry1] : This is a 66 year old gentlemen with a PMH of , cardiomyopathy, hyperlipidemia, hypertension, AICD, DM, lung Nodules, Adrenal Nodule, proteinuria, VitamiN D Deficiency,  , and Neutropenia presents today for follow up. He feels overall well today with no acute complaints or concerns at todays visit. Denies chest pain, dyspnea, dizziness, palpations, changes in appetite/bowel habits, nausea, vomiting, abdominal pain.\par

## 2022-01-28 DIAGNOSIS — T78.2XXA ANAPHYLACTIC SHOCK, UNSPECIFIED, INITIAL ENCOUNTER: ICD-10-CM

## 2022-02-09 NOTE — HISTORY OF PRESENT ILLNESS
[FreeTextEntry1] : Mr. LO is a 66 year old male who returns today for endocrine reevaluation. He returns with regard to a history of type 2 dm. He is taking Metformin Er 750 mg daily. HGM has not been tested regularly. When he did test he saw in the mid 100s. Denies any hx of microvascular complications. Optho neg. Denies any neurologic symptoms. \par \par POCT A1c returned today at ; previously 6.6% from 10/6/2021\par POCT glucose returned today at mg/dL \par \par Additional history includes that of htn and hyperlipidemia along with cardiomyopathy, AICD and Pos for covid last March without residual sequelae. \par History of Vitamin d d deficiency. takes vit d and c and three in one calc mg and zinc\par Wt gain over past 9 months -hurt back -couldn't’t exercise\par hx of laminectomy x 2\par Not active\par \par \par Has been experiencing lower back pain caused by sciatica. Currently in physical therapy

## 2022-02-10 ENCOUNTER — APPOINTMENT (OUTPATIENT)
Dept: ENDOCRINOLOGY | Facility: CLINIC | Age: 67
End: 2022-02-10

## 2022-02-26 ENCOUNTER — RX RENEWAL (OUTPATIENT)
Age: 67
End: 2022-02-26

## 2022-03-20 LAB
25(OH)D3 SERPL-MCNC: 59.6 NG/ML
ALBUMIN SERPL ELPH-MCNC: 4.7 G/DL
ALP BLD-CCNC: 46 U/L
ALT SERPL-CCNC: 10 U/L
ANION GAP SERPL CALC-SCNC: 11 MMOL/L
APPEARANCE: CLEAR
AST SERPL-CCNC: 21 U/L
BACTERIA UR CULT: NORMAL
BACTERIA: NEGATIVE
BASOPHILS # BLD AUTO: 0.02 K/UL
BASOPHILS NFR BLD AUTO: 0.6 %
BILIRUB DIRECT SERPL-MCNC: 0.1 MG/DL
BILIRUB INDIRECT SERPL-MCNC: 0.5 MG/DL
BILIRUB SERPL-MCNC: 0.6 MG/DL
BILIRUBIN URINE: NEGATIVE
BLOOD URINE: NEGATIVE
BUN SERPL-MCNC: 17 MG/DL
CALCIUM SERPL-MCNC: 10 MG/DL
CHLORIDE SERPL-SCNC: 99 MMOL/L
CHOLEST SERPL-MCNC: 218 MG/DL
CK SERPL-CCNC: 209 U/L
CO2 SERPL-SCNC: 28 MMOL/L
COLOR: NORMAL
CREAT SERPL-MCNC: 0.99 MG/DL
CREAT SPEC-SCNC: 106 MG/DL
EOSINOPHIL # BLD AUTO: 0.13 K/UL
EOSINOPHIL NFR BLD AUTO: 3.6 %
GLUCOSE QUALITATIVE U: NEGATIVE
GLUCOSE SERPL-MCNC: 139 MG/DL
HCT VFR BLD CALC: 47.6 %
HDLC SERPL-MCNC: 67 MG/DL
HGB BLD-MCNC: 16 G/DL
HYALINE CASTS: 0 /LPF
IMM GRANULOCYTES NFR BLD AUTO: 0.3 %
KETONES URINE: NEGATIVE
LDLC SERPL CALC-MCNC: 136 MG/DL
LDLC SERPL DIRECT ASSAY-MCNC: 129 MG/DL
LEUKOCYTE ESTERASE URINE: NEGATIVE
LYMPHOCYTES # BLD AUTO: 1.22 K/UL
LYMPHOCYTES NFR BLD AUTO: 33.8 %
MAN DIFF?: NORMAL
MCHC RBC-ENTMCNC: 31.4 PG
MCHC RBC-ENTMCNC: 33.6 GM/DL
MCV RBC AUTO: 93.3 FL
MICROALBUMIN 24H UR DL<=1MG/L-MCNC: 1.9 MG/DL
MICROALBUMIN/CREAT 24H UR-RTO: 18 MG/G
MICROSCOPIC-UA: NORMAL
MONOCYTES # BLD AUTO: 0.76 K/UL
MONOCYTES NFR BLD AUTO: 21.1 %
NEUTROPHILS # BLD AUTO: 1.47 K/UL
NEUTROPHILS NFR BLD AUTO: 40.6 %
NITRITE URINE: NEGATIVE
NONHDLC SERPL-MCNC: 150 MG/DL
PH URINE: 6
PLATELET # BLD AUTO: 176 K/UL
POTASSIUM SERPL-SCNC: 4.1 MMOL/L
PROT SERPL-MCNC: 7.6 G/DL
PROTEIN URINE: NORMAL
RBC # BLD: 5.1 M/UL
RBC # FLD: 14.5 %
RED BLOOD CELLS URINE: 0 /HPF
SODIUM SERPL-SCNC: 137 MMOL/L
SPECIFIC GRAVITY URINE: 1.02
SQUAMOUS EPITHELIAL CELLS: 1 /HPF
TRIGL SERPL-MCNC: 70 MG/DL
UROBILINOGEN URINE: NORMAL
WBC # FLD AUTO: 3.61 K/UL
WHITE BLOOD CELLS URINE: 1 /HPF

## 2022-03-24 ENCOUNTER — APPOINTMENT (OUTPATIENT)
Dept: ENDOCRINOLOGY | Facility: CLINIC | Age: 67
End: 2022-03-24
Payer: MEDICARE

## 2022-03-24 VITALS
HEART RATE: 82 BPM | TEMPERATURE: 97.9 F | OXYGEN SATURATION: 98 % | WEIGHT: 181 LBS | SYSTOLIC BLOOD PRESSURE: 124 MMHG | HEIGHT: 66 IN | BODY MASS INDEX: 29.09 KG/M2 | DIASTOLIC BLOOD PRESSURE: 78 MMHG

## 2022-03-24 LAB
GLUCOSE BLDC GLUCOMTR-MCNC: 152
HBA1C MFR BLD HPLC: 7.4

## 2022-03-24 PROCEDURE — 99214 OFFICE O/P EST MOD 30 MIN: CPT | Mod: 25

## 2022-03-24 PROCEDURE — 82962 GLUCOSE BLOOD TEST: CPT

## 2022-03-24 PROCEDURE — 36415 COLL VENOUS BLD VENIPUNCTURE: CPT

## 2022-03-24 PROCEDURE — 83036 HEMOGLOBIN GLYCOSYLATED A1C: CPT | Mod: QW

## 2022-03-24 NOTE — HISTORY OF PRESENT ILLNESS
[FreeTextEntry1] : Mr. LO  is a 67 year old male who returns today for endocrine reevaluation. He  returns with regard to  a history of type 2 dm. He continues on Metformin Er 750 mg daily. HGM has been running in the 140s- 150s in the morning, Varies by about 10 points the rest of the day.   Denies any significant episodes of hypoglycemia. Denies any hx of microvascular complications. Ophthalmologic visit up to date. \par POCT A1c returned today at 7.4%  ; previously 6.6% from 10/7/2021\par POCT glucose returned today at 152   mg/dL \par \par Patient with stable adrenal nodule since 2009. \par \par Patient too with history of a nodular thyroid. 7/8/2021 thyroid u/s indicated scattered colloid cysts measuring up to 4 mm in the midpole of the right lobe. In the left lobe there is a 4 mm colloid cyst in the lower pole. \par \par Additional history includes that of htn  and hyperlipidemia along with cardiomyopathy, AICD and Pos for covid  March 2020 without residual sequelae. History of Vitamin d d deficiency.\par hx of laminectomy x 2\par Not active\par Optho neg\par takes vit d and c and three in one calc mg and zinc\par Patient notes sciatica- had epidural injection 4 months ago and is undergoing PT. \par Rx Livalo (has been out for 2 weeks), Olmesartan, gabapentin and prn Tadalafil\par \par

## 2022-03-25 LAB
BASOPHILS # BLD AUTO: 0.01 K/UL
BASOPHILS NFR BLD AUTO: 0.3 %
EOSINOPHIL # BLD AUTO: 0.09 K/UL
EOSINOPHIL NFR BLD AUTO: 2.8 %
HCT VFR BLD CALC: 46.9 %
HGB BLD-MCNC: 15.4 G/DL
IMM GRANULOCYTES NFR BLD AUTO: 0 %
LYMPHOCYTES # BLD AUTO: 1.16 K/UL
LYMPHOCYTES NFR BLD AUTO: 36.7 %
MAN DIFF?: NORMAL
MCHC RBC-ENTMCNC: 31 PG
MCHC RBC-ENTMCNC: 32.8 GM/DL
MCV RBC AUTO: 94.6 FL
MONOCYTES # BLD AUTO: 0.53 K/UL
MONOCYTES NFR BLD AUTO: 16.8 %
NEUTROPHILS # BLD AUTO: 1.37 K/UL
NEUTROPHILS NFR BLD AUTO: 43.4 %
PLATELET # BLD AUTO: 181 K/UL
RBC # BLD: 4.96 M/UL
RBC # FLD: 13.7 %
WBC # FLD AUTO: 3.16 K/UL

## 2022-04-12 ENCOUNTER — APPOINTMENT (OUTPATIENT)
Dept: CARDIOLOGY | Facility: CLINIC | Age: 67
End: 2022-04-12
Payer: MEDICARE

## 2022-04-12 PROCEDURE — 93880 EXTRACRANIAL BILAT STUDY: CPT

## 2022-04-12 PROCEDURE — 93925 LOWER EXTREMITY STUDY: CPT

## 2022-05-25 ENCOUNTER — NON-APPOINTMENT (OUTPATIENT)
Age: 67
End: 2022-05-25

## 2022-05-25 ENCOUNTER — LABORATORY RESULT (OUTPATIENT)
Age: 67
End: 2022-05-25

## 2022-05-25 ENCOUNTER — APPOINTMENT (OUTPATIENT)
Dept: CARDIOLOGY | Facility: CLINIC | Age: 67
End: 2022-05-25
Payer: MEDICARE

## 2022-05-25 VITALS
BODY MASS INDEX: 28.12 KG/M2 | HEART RATE: 84 BPM | HEIGHT: 66 IN | DIASTOLIC BLOOD PRESSURE: 70 MMHG | TEMPERATURE: 98.3 F | SYSTOLIC BLOOD PRESSURE: 120 MMHG | WEIGHT: 175 LBS | OXYGEN SATURATION: 99 %

## 2022-05-25 DIAGNOSIS — R80.9 PROTEINURIA, UNSPECIFIED: ICD-10-CM

## 2022-05-25 PROCEDURE — 93000 ELECTROCARDIOGRAM COMPLETE: CPT

## 2022-05-25 PROCEDURE — 99214 OFFICE O/P EST MOD 30 MIN: CPT

## 2022-05-25 RX ORDER — LANCETS
EACH MISCELLANEOUS
Qty: 2 | Refills: 0 | Status: ACTIVE | COMMUNITY
Start: 2021-01-29 | End: 1900-01-01

## 2022-05-25 RX ORDER — DICLOFENAC SODIUM 3 G/100G
3 GEL TOPICAL TWICE DAILY
Qty: 1 | Refills: 2 | Status: DISCONTINUED | COMMUNITY
Start: 2021-03-31 | End: 2022-05-25

## 2022-05-25 RX ORDER — GABAPENTIN 300 MG/1
300 CAPSULE ORAL
Qty: 30 | Refills: 0 | Status: DISCONTINUED | COMMUNITY
Start: 2021-08-25 | End: 2022-05-25

## 2022-05-25 RX ORDER — BLOOD SUGAR DIAGNOSTIC
STRIP MISCELLANEOUS
Qty: 2 | Refills: 2 | Status: ACTIVE | COMMUNITY
Start: 2021-01-29 | End: 1900-01-01

## 2022-05-25 RX ORDER — METHYLPREDNISOLONE 4 MG/1
4 TABLET ORAL
Qty: 1 | Refills: 0 | Status: DISCONTINUED | COMMUNITY
Start: 2022-05-04 | End: 2022-05-25

## 2022-05-25 RX ORDER — MELOXICAM 15 MG/1
15 TABLET ORAL
Qty: 30 | Refills: 0 | Status: DISCONTINUED | COMMUNITY
Start: 2021-08-25 | End: 2022-05-25

## 2022-05-25 RX ORDER — METHYLPREDNISOLONE 4 MG/1
4 TABLET ORAL
Qty: 1 | Refills: 0 | Status: DISCONTINUED | COMMUNITY
Start: 2021-01-27 | End: 2022-05-25

## 2022-05-25 NOTE — HISTORY OF PRESENT ILLNESS
[FreeTextEntry1] : This is a 68yo male who presents today for follow-up. Patient states that this past Saturday he developed weakness and blurry vision while outside, he went inside his house as it was hot outside and checked his BP which was 84/47 Patient reports his symptoms then resolved. He also reports he received the COVID booster 2 weeks ago, states the day after he developed back pain radiating down his right hip and leg but this is now improving. he has hx of back pain, s/p laminectomy several years ago. Took medrol dose pack which was prescribed by the back specialist that he sees. He has f/u this Friday. No other issues or concerns for today.

## 2022-05-25 NOTE — REVIEW OF SYSTEMS
Coronary artery disease, angina presence unspecified, unspecified vessel or lesion type, unspecified whether native or transplanted heart [Negative] : Heme/Lymph [FreeTextEntry5] : see hpi  [FreeTextEntry9] : back pain

## 2022-05-26 ENCOUNTER — APPOINTMENT (OUTPATIENT)
Dept: CARDIOLOGY | Facility: CLINIC | Age: 67
End: 2022-05-26
Payer: MEDICARE

## 2022-05-26 ENCOUNTER — RESULT CHARGE (OUTPATIENT)
Age: 67
End: 2022-05-26

## 2022-05-26 ENCOUNTER — NON-APPOINTMENT (OUTPATIENT)
Age: 67
End: 2022-05-26

## 2022-05-26 PROCEDURE — 93306 TTE W/DOPPLER COMPLETE: CPT

## 2022-05-26 RX ORDER — OLMESARTAN MEDOXOMIL 5 MG/1
5 TABLET, FILM COATED ORAL
Refills: 0 | Status: DISCONTINUED | COMMUNITY
End: 2022-05-26

## 2022-05-27 ENCOUNTER — NON-APPOINTMENT (OUTPATIENT)
Age: 67
End: 2022-05-27

## 2022-05-27 ENCOUNTER — APPOINTMENT (OUTPATIENT)
Dept: ELECTROPHYSIOLOGY | Facility: CLINIC | Age: 67
End: 2022-05-27
Payer: MEDICARE

## 2022-05-27 ENCOUNTER — APPOINTMENT (OUTPATIENT)
Dept: ORTHOPEDIC SURGERY | Facility: CLINIC | Age: 67
End: 2022-05-27
Payer: OTHER MISCELLANEOUS

## 2022-05-27 VITALS — OXYGEN SATURATION: 98 % | SYSTOLIC BLOOD PRESSURE: 159 MMHG | DIASTOLIC BLOOD PRESSURE: 91 MMHG | HEART RATE: 83 BPM

## 2022-05-27 VITALS
DIASTOLIC BLOOD PRESSURE: 97 MMHG | SYSTOLIC BLOOD PRESSURE: 153 MMHG | WEIGHT: 175 LBS | BODY MASS INDEX: 28.25 KG/M2 | HEART RATE: 72 BPM

## 2022-05-27 DIAGNOSIS — M54.16 RADICULOPATHY, LUMBAR REGION: ICD-10-CM

## 2022-05-27 PROCEDURE — 99213 OFFICE O/P EST LOW 20 MIN: CPT

## 2022-05-27 PROCEDURE — 99072 ADDL SUPL MATRL&STAF TM PHE: CPT

## 2022-05-27 PROCEDURE — 93000 ELECTROCARDIOGRAM COMPLETE: CPT | Mod: 59

## 2022-05-27 PROCEDURE — 93284 PRGRMG EVAL IMPLANTABLE DFB: CPT

## 2022-05-27 NOTE — DISCUSSION/SUMMARY
[Medication Risks Reviewed] : Medication risks reviewed [de-identified] : Try gabapentin which he has at home, completed medrol dosepak. \par Will continue cardiac treatment given his recent worsening of EF.  A new prescription of physical therapy was provided.  If his symptoms persist or worsen lumbar epidural steroid injections may be considered future right L4 and L5.  He has reported aggravation of his symptoms without any recent trauma though he feels that it could be related to a recent COVID-vaccine booster injection.

## 2022-05-27 NOTE — REASON FOR VISIT
[Follow-Up Visit] : a follow-up visit for [Workers' Comp: Date of Injury: _______] : This visit is related to worker's compensation. Date of Injury: [unfilled] [Herniated Discs] : herniated discs [Radiculopathy] : radiculopathy [FreeTextEntry2] : DDD

## 2022-05-27 NOTE — HISTORY OF PRESENT ILLNESS
[Importance of Diet and Exercise] : importance of diet and exercise to improve glycemic control, achieve weight loss and improve cardiovascular health [6] : a current pain level of 6/10 [FreeTextEntry1] : 53 y/o F pt with:\par 1. Hx of pre-DM:\par Set goal to normalize A1c with diet and physical exercise.\par \par 2. Hx of obesity, with BMI of 38:\par Pt has no known comorbidity associated with obesity; pt's lipid and BP are normal. Set goal for weight reduction of 5 -10% within next 3 months; will reassess afterwords. Will start pt on Orlistat 120mg, benefits and side effects explained to pt. Refer pt to see RD. \par \par f/u 3 months [Constant] : ~He/She~ states the symptoms seem to be constant [FreeTextEntry2] : increase physical activities as tolerated [Bending] : worsened by bending [Lifting] : worsened by lifting [Sitting] : worsened by sitting [Standing] : worsened by standing [Walking] : worsened by walking [Physical Therapy] : relieved by physical therapy [Improving] : improving [___ wks] : [unfilled] week(s) ago [de-identified] : Patient present with complaints of pain to low back that was exacerbated s/p Moderna COVID-19 booster.  Patient received the booster 3 weeks ago.  Patient states the pain radiates down right leg.  Also complains of pulsating burning sensation to right leg.\par PT 2x/week- was helping prior to injection.\par Numbness noted to right thigh which increased after booster.\par Pain wakes him out of sleep; unable to get into a comfortable position.  Has to sleep on his side, unable to sleep on back. \par Overall 75%-80% better.\par Saw a cardiologist yesterday, had echo EF 35% yesterday, went to the hospital today to interrogate pacemaker/defib - started on new medication\par Had generalized weakness [de-identified] : Voltaren gel, Tiger Balm

## 2022-05-27 NOTE — PHYSICAL EXAM
[Stooped] : stooped [Cane] : ambulates with cane [SLR] : positive straight leg raise [] : Motor: [NL] : Motor strength of the left lower extremity was normal [___/5] : right ([unfilled]/5) [Motor Strength Lower Extremities] : right (5/5) [LE] : Sensory: Intact in bilateral lower extremities [1+] : right patella 1+ [2+] : left ankle jerk 2+ [DP] : dorsalis pedis 2+ and symmetric bilaterally [PT] : posterior tibial 2+ and symmetric bilaterally [Poor Appearance] : well-appearing [Acute Distress] : not in acute distress [Obese] : not obese [Abl Mood] : in a normal mood [Abl Affect] : with normal affect [Poor Coordination] : normal coordination [Disorientation] : oriented x 3 [Plantar Reflex Right Only] : absent on the right [Plantar Reflex Left Only] : absent on the left [DTR Reflexes Clonus Of Right Ankle (___ Beats)] : absent on the right [DTR Reflexes Clonus Of Left Ankle (___ Beats)] : absent on the left [FreeTextEntry2] : The pt is awake, alert and oriented to self, place and time, is comfortable but in no acute distress. Gait examination reveals a narrow based, non-ataxic, non-antalgic gait. Can heel and toe walk without difficulty. Inspection of neck, back and lower extremities bilaterally reveals no rashes or ecchymotic lesions.  There is no obvious abnormal spinal curvature in the sagittal and coronal planes. There is tenderness over the lumbar spine in the midline as well as in the right paraspinal musculature more than the left. There is no sacroiliac tenderness. No greater trochanteric tenderness bilaterally. No atrophy or abnormal movements noted in the upper or lower extremities. There is no swelling noted in the upper or lower extremities bilaterally. No cervical lymphadenopathy noted anteriorly.\par Lumbar spine range of motion is restricted with forward flexion of 40° and extension of 25° with extension more painful than flexion.  \par Negative straight leg raise to 45° in the supine position bilaterally with primarily low back pain noted. There is no groin pain with hip internal rotation and a negative JAMIL test bilaterally.  [de-identified] : well-healed lumbar midline incision from prior surgery

## 2022-05-31 ENCOUNTER — RX RENEWAL (OUTPATIENT)
Age: 67
End: 2022-05-31

## 2022-05-31 ENCOUNTER — NON-APPOINTMENT (OUTPATIENT)
Age: 67
End: 2022-05-31

## 2022-05-31 LAB
ALBUMIN SERPL ELPH-MCNC: 4.6 G/DL
ALP BLD-CCNC: 48 U/L
ALT SERPL-CCNC: 10 U/L
ANION GAP SERPL CALC-SCNC: 17 MMOL/L
AST SERPL-CCNC: 20 U/L
BASOPHILS # BLD AUTO: 0.02 K/UL
BASOPHILS NFR BLD AUTO: 0.6 %
BILIRUB SERPL-MCNC: 0.9 MG/DL
BUN SERPL-MCNC: 20 MG/DL
CALCIUM SERPL-MCNC: 10.5 MG/DL
CHLORIDE SERPL-SCNC: 99 MMOL/L
CHOLEST SERPL-MCNC: 237 MG/DL
CK SERPL-CCNC: 179 U/L
CO2 SERPL-SCNC: 24 MMOL/L
CREAT SERPL-MCNC: 1.19 MG/DL
EGFR: 67 ML/MIN/1.73M2
EOSINOPHIL # BLD AUTO: 0.09 K/UL
EOSINOPHIL NFR BLD AUTO: 2.7 %
GLUCOSE SERPL-MCNC: 147 MG/DL
HCT VFR BLD CALC: 50.2 %
HDLC SERPL-MCNC: 50 MG/DL
HGB BLD-MCNC: 16.8 G/DL
IMM GRANULOCYTES NFR BLD AUTO: 0.3 %
LDLC SERPL CALC-MCNC: 163 MG/DL
LDLC SERPL DIRECT ASSAY-MCNC: 155 MG/DL
LYMPHOCYTES # BLD AUTO: 1.12 K/UL
LYMPHOCYTES NFR BLD AUTO: 34 %
MAN DIFF?: NORMAL
MCHC RBC-ENTMCNC: 30.5 PG
MCHC RBC-ENTMCNC: 33.5 GM/DL
MCV RBC AUTO: 91.1 FL
MONOCYTES # BLD AUTO: 0.64 K/UL
MONOCYTES NFR BLD AUTO: 19.5 %
NEUTROPHILS # BLD AUTO: 1.41 K/UL
NEUTROPHILS NFR BLD AUTO: 42.9 %
NONHDLC SERPL-MCNC: 187 MG/DL
PLATELET # BLD AUTO: 181 K/UL
POTASSIUM SERPL-SCNC: 4.8 MMOL/L
PROT SERPL-MCNC: 8 G/DL
RBC # BLD: 5.51 M/UL
RBC # FLD: 13.1 %
SODIUM SERPL-SCNC: 139 MMOL/L
T4 FREE SERPL-MCNC: 1.4 NG/DL
TRIGL SERPL-MCNC: 122 MG/DL
TSH SERPL-ACNC: 2.22 UIU/ML
WBC # FLD AUTO: 3.29 K/UL

## 2022-06-01 ENCOUNTER — APPOINTMENT (OUTPATIENT)
Dept: CARDIOLOGY | Facility: CLINIC | Age: 67
End: 2022-06-01
Payer: MEDICARE

## 2022-06-01 PROCEDURE — 78452 HT MUSCLE IMAGE SPECT MULT: CPT

## 2022-06-01 PROCEDURE — 93015 CV STRESS TEST SUPVJ I&R: CPT

## 2022-06-01 PROCEDURE — A9500: CPT

## 2022-06-01 RX ORDER — REGADENOSON 0.08 MG/ML
0.4 INJECTION, SOLUTION INTRAVENOUS
Qty: 1 | Refills: 0 | Status: COMPLETED | OUTPATIENT
Start: 2022-06-01

## 2022-06-01 RX ADMIN — REGADENOSON 5 MG/5ML: 0.08 INJECTION, SOLUTION INTRAVENOUS at 00:00

## 2022-06-03 ENCOUNTER — NON-APPOINTMENT (OUTPATIENT)
Age: 67
End: 2022-06-03

## 2022-06-07 ENCOUNTER — APPOINTMENT (OUTPATIENT)
Dept: CARDIOLOGY | Facility: CLINIC | Age: 67
End: 2022-06-07

## 2022-06-08 ENCOUNTER — APPOINTMENT (OUTPATIENT)
Dept: HEART FAILURE | Facility: CLINIC | Age: 67
End: 2022-06-08
Payer: MEDICARE

## 2022-06-08 VITALS
BODY MASS INDEX: 28.61 KG/M2 | DIASTOLIC BLOOD PRESSURE: 76 MMHG | HEIGHT: 66 IN | OXYGEN SATURATION: 96 % | SYSTOLIC BLOOD PRESSURE: 142 MMHG | TEMPERATURE: 98.5 F | WEIGHT: 178 LBS | RESPIRATION RATE: 16 BRPM | HEART RATE: 85 BPM

## 2022-06-08 PROCEDURE — 99205 OFFICE O/P NEW HI 60 MIN: CPT

## 2022-06-08 RX ORDER — TADALAFIL 20 MG/1
20 TABLET ORAL
Qty: 10 | Refills: 3 | Status: DISCONTINUED | COMMUNITY
Start: 2020-03-12 | End: 2022-06-08

## 2022-06-08 RX ORDER — IBUPROFEN 800 MG/1
800 TABLET, FILM COATED ORAL 3 TIMES DAILY
Qty: 30 | Refills: 2 | Status: DISCONTINUED | COMMUNITY
Start: 2021-03-22 | End: 2022-06-08

## 2022-06-08 RX ORDER — DEXLANSOPRAZOLE 60 MG/1
60 CAPSULE, DELAYED RELEASE ORAL
Qty: 90 | Refills: 1 | Status: ACTIVE | COMMUNITY
Start: 2017-08-28

## 2022-06-11 NOTE — ASSESSMENT
[FreeTextEntry1] : #Chronic systolic HF ACC/AHA stage C, NIYHA II\par In setting of NICMP LVEF 38%, LVIDd 5cm.  As per records had LVEF ~20% in 2009 which then recovered to 40-50%. \par Clinically euvolemic, warm extremities. \par Labs from 5/25/22 reviewed\par GDMT: Start farxiga 10mg daily. Continue  Coreg 25mg BID, entresto 24-26mg BID. Plan to MRA and uptitrate ARNi.\par Will check labs in 7-14 days to continue GDMT optimization. \par Diuretics: not needed\par Device: s/p CRT-D (LV epicardial lead, not in ideal position) Bi-v 99%. No shocks. \par Cardiac rehab referral\par HF education provided including lifestyle changes (low Na diet, fluid restriction, increase physical activity), current clinical condition, natural progression and prognosis.\par NP f/u q 2 weeks, for GDMT up titration and Dr. Kay in 2 months \par \par #HTN\par Controlled\par \par #Nonobstructive CAD\par ASA/Livalo\par \par #DM\par A1c 7.4\par Metformin\par Starting SGLT2i for HF

## 2022-06-11 NOTE — HISTORY OF PRESENT ILLNESS
[FreeTextEntry1] : Mr. Camara is a 67 year old male with a PMH chronic systolic HF ACC/AHA stage C, NICM (LVEF 38%, LVIDd 5cm), S/P CRT-D ( with LV epicardial lead)  in 7/2019, covid infection '20, HLD, HTN, DM, lung Nodules, Adrenal Nodule, proteinuria, VitamiN D Deficiency, and Neutropenia who presents today for initial consultation of his cardiomyopathy.\par \par He has followed with Dr. Grady for about 20 years when he was 1st told he had a LBBB. In 2009 was on vacation and felt generalized fatigued and ?arrhythmia. At that time had reduced LVEF of 20% so  and underwent Biv ICD  implant. He was started on BB and baby ASA. He has had longstanding follow up and management of his HF with Dr. Grady. It seems he was on Benicar but at some point it was discontinued due to lightheadedness.  He's had serial  TTEs in [employer] which revealed LVEF ~40-45%. His most recent TTE was done on 5/26 and it showed LVEF 38%. He has been started on Entresto recently [facility] then. He also underwent nuclear stress testing and prelim report indicates mildly dilated LV with normal perfusion. Pt denies any recent HF hospitalizations or ED visits. His physical activity is limited by sciatica pain.  No h/o ICD shocks. \par \par States his AT has remained the same which is about ~2 miles without limitations. Had COVID booster about three weeks ago and felt unwell with diffuse muscle and joint pain started on Medrol Pack by ortho doctor. Developed generalized weakness about three weeks ago while gardening saw his BP 87/43 so he hydrated and noted improve. States his BP readings have been 124//63. \par \par He reports no overt heart failure symptoms, specifically denies orthopnea, PND, bendopnea, LE edema, chest discomfort, dizziness/lightheadedness, palpitations or syncope. Patient denies any recent ICD shocks. No recent hospitalizations or visits to the ED.\par \par \par \par \par

## 2022-06-11 NOTE — CARDIOLOGY SUMMARY
[de-identified] : 5/27/22 SR. V-paced [de-identified] : \par 5/2022 TTE: LVEF 38% LVIDd 4.98cm, Grade I DD, mild AI, RV nl size/function, RVSP 27mmHg, dilated Asc Ao 3.9cm\par \par 4/6/21 TTE: LVIDd 5.3, LVEF 41%, increased LV wall thickness, mild global LV hypokenesis, grade I diastolic dysfunction, normal RVSF, mild LA enlargement, Mild MR, mild AR,  mild TR,  [de-identified] : 5/27/22 Device:  no arrhythmia, no therapies. AP <1%, BiV pacing 99%. Bi-V optimization done ot the best as LV epicardial lead is not in optimal position.  [de-identified] : \par [facility] 5/6/11: Normal Coronaries

## 2022-06-11 NOTE — END OF VISIT
[Time Spent: ___ minutes] : I have spent [unfilled] minutes of time on the encounter. [>50% of the face to face encounter time was spent on counseling and/or coordination of care for ___] : Greater than 50% of the face to face encounter time was spent on counseling and/or coordination of care for [unfilled] [FreeTextEntry3] :  IKailey MD independently performed a history and physical examination of the patient, and formulated the management plan.\par I have reviewed the note by NIGEL Gonzales and agree with the documented findings and plan of care.\par \par 66 y/o male with h/o chronic systolic HF ACC/AHA stage C, NICM (LVEF 38%, LVIDd 5cm), prior h/o reverse remodelling LVEF ~20% in 2009. nonobstructive CAD, S/P CRT-D (with LV epicardial lead) in 7/2019), covid infection '20, HLD, HTN, DM, lung Nodules, adrenal nodule, proteinuria, vitamiN D deficiency and neutropenia who was referred for HF care. \par As per records pt was diagnosed with NICMP in '09 with an LVEF ~20%. He underwent CRT-D placement and was on some GDMT. Serial TTEs available in AllScripts show LVEF 45-50%. Pt reports he feels well, he denies overt HF symptoms or recent hospitalizations. His physical activity is limited by sciatica pain.  There was report of an episode of low BP a few weeks, no further events since then. Clinically looks euvolemic, warm extremities. A recent TTE shows LVEF ~38%. He was started on ARNi low dose which he has tolerated well.  He meeds GDMt optimization, will add SGLT2i, MRA and uptitrate ARNi. He will benefit from cardiac rehab once his back pain improves. \par Plan as above.

## 2022-06-11 NOTE — REASON FOR VISIT
[Cardiac Failure] : cardiac failure [FreeTextEntry1] : \par Cards: Terry Grady MD\par \par EP: Terry Carbone MD

## 2022-06-21 ENCOUNTER — APPOINTMENT (OUTPATIENT)
Dept: HEART FAILURE | Facility: CLINIC | Age: 67
End: 2022-06-21

## 2022-06-21 PROCEDURE — 99443: CPT | Mod: 95

## 2022-06-22 LAB
ALBUMIN SERPL ELPH-MCNC: 4.5 G/DL
ALP BLD-CCNC: 41 U/L
ALT SERPL-CCNC: 11 U/L
ANION GAP SERPL CALC-SCNC: 10 MMOL/L
AST SERPL-CCNC: 19 U/L
BILIRUB SERPL-MCNC: 0.4 MG/DL
BUN SERPL-MCNC: 19 MG/DL
CALCIUM SERPL-MCNC: 8.9 MG/DL
CHLORIDE SERPL-SCNC: 106 MMOL/L
CO2 SERPL-SCNC: 21 MMOL/L
CREAT SERPL-MCNC: 1.06 MG/DL
EGFR: 77 ML/MIN/1.73M2
GLUCOSE SERPL-MCNC: 158 MG/DL
NT-PROBNP SERPL-MCNC: 49 PG/ML
POTASSIUM SERPL-SCNC: 4.2 MMOL/L
PROT SERPL-MCNC: 6.7 G/DL
SODIUM SERPL-SCNC: 137 MMOL/L

## 2022-06-27 ENCOUNTER — NON-APPOINTMENT (OUTPATIENT)
Age: 67
End: 2022-06-27

## 2022-06-27 ENCOUNTER — APPOINTMENT (OUTPATIENT)
Dept: CARDIOLOGY | Facility: CLINIC | Age: 67
End: 2022-06-27
Payer: MEDICARE

## 2022-06-27 VITALS
TEMPERATURE: 98.4 F | HEIGHT: 66 IN | SYSTOLIC BLOOD PRESSURE: 130 MMHG | WEIGHT: 174.5 LBS | DIASTOLIC BLOOD PRESSURE: 76 MMHG | BODY MASS INDEX: 28.04 KG/M2 | HEART RATE: 74 BPM | OXYGEN SATURATION: 96 %

## 2022-06-27 DIAGNOSIS — R55 SYNCOPE AND COLLAPSE: ICD-10-CM

## 2022-06-27 PROCEDURE — 99213 OFFICE O/P EST LOW 20 MIN: CPT

## 2022-06-27 PROCEDURE — 93000 ELECTROCARDIOGRAM COMPLETE: CPT

## 2022-06-27 NOTE — HISTORY OF PRESENT ILLNESS
[FreeTextEntry1] : This is a 68yo male who presents today for follow-up. Patient states he has been doing well, was recently seen by Dr. Kay, Entresto increased to 49-51 BID. Has no other issues or concerns for today. no further episodes of near-syncope. Patient denies chest pain, shortness of breath, palpitations, dizziness, vision changes, n/v, abdominal pain, changes in bowel/bladder habits,  or appetite.

## 2022-07-01 ENCOUNTER — NON-APPOINTMENT (OUTPATIENT)
Age: 67
End: 2022-07-01

## 2022-07-01 ENCOUNTER — LABORATORY RESULT (OUTPATIENT)
Age: 67
End: 2022-07-01

## 2022-07-01 LAB
ANION GAP SERPL CALC-SCNC: 12 MMOL/L
BUN SERPL-MCNC: 15 MG/DL
CALCIUM SERPL-MCNC: 9.6 MG/DL
CHLORIDE SERPL-SCNC: 109 MMOL/L
CO2 SERPL-SCNC: 22 MMOL/L
CREAT SERPL-MCNC: 1.03 MG/DL
EGFR: 80 ML/MIN/1.73M2
GLUCOSE SERPL-MCNC: 137 MG/DL
POTASSIUM SERPL-SCNC: 4 MMOL/L
SODIUM SERPL-SCNC: 142 MMOL/L

## 2022-07-08 ENCOUNTER — NON-APPOINTMENT (OUTPATIENT)
Age: 67
End: 2022-07-08

## 2022-07-08 ENCOUNTER — APPOINTMENT (OUTPATIENT)
Dept: ORTHOPEDIC SURGERY | Facility: CLINIC | Age: 67
End: 2022-07-08

## 2022-07-08 DIAGNOSIS — M51.36 OTHER INTERVERTEBRAL DISC DEGENERATION, LUMBAR REGION: ICD-10-CM

## 2022-07-08 LAB
ALBUMIN SERPL ELPH-MCNC: 4.3 G/DL
ALP BLD-CCNC: 38 U/L
ALT SERPL-CCNC: 11 U/L
AST SERPL-CCNC: 15 U/L
BASOPHILS # BLD AUTO: 0.02 K/UL
BASOPHILS NFR BLD AUTO: 0.6 %
BILIRUB DIRECT SERPL-MCNC: 0.2 MG/DL
BILIRUB INDIRECT SERPL-MCNC: 0.5 MG/DL
BILIRUB SERPL-MCNC: 0.6 MG/DL
CHOLEST SERPL-MCNC: 174 MG/DL
CK SERPL-CCNC: 153 U/L
EOSINOPHIL # BLD AUTO: 0.14 K/UL
EOSINOPHIL NFR BLD AUTO: 4.5 %
HCT VFR BLD CALC: 44.2 %
HDLC SERPL-MCNC: 52 MG/DL
HGB BLD-MCNC: 15.6 G/DL
IMM GRANULOCYTES NFR BLD AUTO: 0.3 %
LDLC SERPL CALC-MCNC: 105 MG/DL
LYMPHOCYTES # BLD AUTO: 1.08 K/UL
LYMPHOCYTES NFR BLD AUTO: 35 %
MAN DIFF?: NORMAL
MCHC RBC-ENTMCNC: 30.9 PG
MCHC RBC-ENTMCNC: 35.3 GM/DL
MCV RBC AUTO: 87.5 FL
MONOCYTES # BLD AUTO: 0.58 K/UL
MONOCYTES NFR BLD AUTO: 18.8 %
NEUTROPHILS # BLD AUTO: 1.26 K/UL
NEUTROPHILS NFR BLD AUTO: 40.8 %
NONHDLC SERPL-MCNC: 122 MG/DL
PLATELET # BLD AUTO: 179 K/UL
PROT SERPL-MCNC: 6.6 G/DL
RBC # BLD: 5.05 M/UL
RBC # FLD: 14.2 %
TRIGL SERPL-MCNC: 85 MG/DL
WBC # FLD AUTO: 3.09 K/UL

## 2022-07-08 PROCEDURE — 99212 OFFICE O/P EST SF 10 MIN: CPT | Mod: 95

## 2022-07-08 NOTE — DISCUSSION/SUMMARY
[Medication Risks Reviewed] : Medication risks reviewed [de-identified] : Patient reports 80% relief of symptoms with PT and previousl completed medrol dosepak. Does not want any new medications given his recent worsening of cardiac EF and change of medications. Recommended he continue self directed exercise program and followup in 2-3 months prn.No current indication for injections or surgery. He previously had right leg pain and now has increased left leg pain. Bilateral L4 STEFANO might be an option for him in the future.

## 2022-07-08 NOTE — HISTORY OF PRESENT ILLNESS
[Improving] : improving [1] : a minimum pain level of 1/10 [9] : a maximum pain level of 9/10 [Rest] : relieved by rest [Home] : at home, [unfilled] , at the time of the visit. [Verbal consent obtained from patient] : the patient, [unfilled] [Medical Office: ([facility])___] : at the medical office located in  [de-identified] : Patient is prepared for telehealth visit today. He is following up on lumbar pain with radiculopathy. He completed his physical therapy sessions stating improvement noted. He still must move with caution or he will experience pain up to 9/10.  He states the pain occurs walking on uneven ground or missing a step. Pain is worse in leftleft leg radiating into buttock and posterior thigh and calf. Less so right leg pain\par Back pain improved with PT, completed it\par No current meds for back pain\par Medications changed for EF 37%\par Overall 80% better. back pain with sudden chanes [de-identified] : positional or miss stepping [de-identified] : P

## 2022-07-08 NOTE — REASON FOR VISIT
[Follow-Up Visit] : a follow-up visit for [FreeTextEntry2] : Follow up on Lumbar pain  DOI 9/21/88 retired

## 2022-07-14 ENCOUNTER — APPOINTMENT (OUTPATIENT)
Dept: ENDOCRINOLOGY | Facility: CLINIC | Age: 67
End: 2022-07-14

## 2022-07-14 VITALS
BODY MASS INDEX: 28.25 KG/M2 | DIASTOLIC BLOOD PRESSURE: 78 MMHG | TEMPERATURE: 98.6 F | OXYGEN SATURATION: 97 % | SYSTOLIC BLOOD PRESSURE: 148 MMHG | WEIGHT: 175 LBS | HEART RATE: 82 BPM | RESPIRATION RATE: 15 BRPM

## 2022-07-14 DIAGNOSIS — Z86.39 PERSONAL HISTORY OF OTHER ENDOCRINE, NUTRITIONAL AND METABOLIC DISEASE: ICD-10-CM

## 2022-07-14 DIAGNOSIS — E04.9 NONTOXIC GOITER, UNSPECIFIED: ICD-10-CM

## 2022-07-14 LAB
GLUCOSE BLDC GLUCOMTR-MCNC: 110
HBA1C MFR BLD HPLC: 7.7

## 2022-07-14 PROCEDURE — 99214 OFFICE O/P EST MOD 30 MIN: CPT | Mod: 25

## 2022-07-14 PROCEDURE — 83036 HEMOGLOBIN GLYCOSYLATED A1C: CPT | Mod: QW

## 2022-07-14 PROCEDURE — 95250 CONT GLUC MNTR PHYS/QHP EQP: CPT

## 2022-07-14 NOTE — HISTORY OF PRESENT ILLNESS
[FreeTextEntry1] : Mr. LO is a 67 year old male who returns today for endocrine reevaluation. He returns with regard to a history of type 2 dm. He continues on Metformin Er 750 mg daily and farxiga 10 mg (started about 2-3 weeks ago) .\par  HGM has been running in the 110-130 in AM and HS. \par Denies any significant episodes of hypoglycemia. Denies any hx of microvascular complications. Ophthalmologic visit NOT up to date, pending follow up appointment . \par POCT A1c returned today at 7.7% ; previously 7.4% on 3/24/22 \par POCT glucose returned today at  mg/dL \par \par Patient did go to the [facility] as per Dr. Grady 1 month ago due to low EF (38% from usual 50%) and weakness. Entresto was increased per hospital and pacemaker was manipulated and farxiga 10 mg was introduced. Pending repeat echo on 7/18/22 with HF team. This happened 2-3 days after covid booster vaccine. At this time, patient is feeling better and asymptomatic \par Patient with stable adrenal nodule since 2009. \par \par Patient too with history of a nodular thyroid. 7/8/2021 thyroid u/s indicated scattered colloid cysts measuring up to 4 mm in the midpole of the right lobe. In the left lobe there is a 4 mm colloid cyst in the lower pole. \par \par Additional history includes that of htn and hyperlipidemia along with cardiomyopathy, AICD and Pos for covid March 2020 without residual sequelae. History of Vitamin d d deficiency.\par hx of laminectomy x 2\par takes vit d 1000 IU and vit c and three in one calc mg and zinc. Is taking entresto, carvedilol, livalo 4 mg. \par Patient notes sciatica- had epidural injection 4 months ago and is undergoing PT. \par Rx Livalo (has been out for 2 weeks), Olmesartan, gabapentin and prn Tadalafil\par

## 2022-07-18 ENCOUNTER — APPOINTMENT (OUTPATIENT)
Dept: HEART FAILURE | Facility: CLINIC | Age: 67
End: 2022-07-18

## 2022-07-18 VITALS
BODY MASS INDEX: 28.77 KG/M2 | HEART RATE: 83 BPM | WEIGHT: 179 LBS | HEIGHT: 66 IN | OXYGEN SATURATION: 97 % | SYSTOLIC BLOOD PRESSURE: 122 MMHG | DIASTOLIC BLOOD PRESSURE: 76 MMHG

## 2022-07-18 PROCEDURE — 99214 OFFICE O/P EST MOD 30 MIN: CPT

## 2022-07-18 RX ORDER — SACUBITRIL AND VALSARTAN 24; 26 MG/1; MG/1
24-26 TABLET, FILM COATED ORAL
Qty: 180 | Refills: 0 | Status: DISCONTINUED | COMMUNITY
Start: 2022-06-08 | End: 2022-07-18

## 2022-07-18 NOTE — ASSESSMENT
[FreeTextEntry1] : 68 y/o male with h/o chronic systolic HF ACC/AHA stage C, NICM (LVEF 38%, LVIDd 5cm), prior h/o reverse remodelling LVEF ~20% in 2009. nonobstructive CAD, S/P CRT-D (with LV epicardial lead) in 7/2019), covid infection '20, HLD, HTN, DM, lung Nodules, adrenal nodule, proteinuria, vitamiN D deficiency and neutropenia who presents today for routine follow up overall doing well. He's currently reporting NYHA Class I-II symptoms, normotensive, tolerating uptitration of GDMT with room for further escalation. His renal function is normal on most recent labs. \par \par #Chronic systolic HF ACC/AHA stage C, NIYHA II\par In setting of NICMP LVEF 38%, LVIDd 5cm.  As per records had LVEF ~20% in 2009 which then recovered to 40-50%. \par GDMT: Increase Entresto to 97-103mg BID, with repeat labs in 7-10 days to reassess renal function and electrolytes. Continue farxiga 10mg daily, Coreg 25mg BID. Will plan to add MRA as potassium allows once maximized on Entresto.\par Diuretics: euvolemic off loop diuretics\par Device: s/p CRT-D (LV epicardial lead, not in ideal position) Bi-v 99%. No shocks. \par Will follow up cardiac rehab referral\par \par #HTN\par Well controlled on HF GDMT as above\par \par #Nonobstructive CAD\par ASA/Livalo\par \par #DM\par A1c 7.4\par Metformin\par SGLT2i for HF\par \par Follow up with Dr. Kay as scheduled on 8/29

## 2022-07-18 NOTE — PHYSICAL EXAM
[No Rash] : no rash [Alert and Oriented] : alert and oriented [No Xanthelasma] : no xanthelasma [No Edema] : no edema [Normal] : moves all extremities, no focal deficits, normal speech [de-identified] : wearing a facemask [de-identified] : JVP 6, negative HJR [de-identified] : warm peripherally

## 2022-07-18 NOTE — CARDIOLOGY SUMMARY
[de-identified] : 5/27/22 SR. V-paced [de-identified] : \par 5/2022 TTE: LVEF 38% LVIDd 4.98cm, Grade I DD, mild AI, RV nl size/function, RVSP 27mmHg, dilated Asc Ao 3.9cm\par \par 4/6/21 TTE: LVIDd 5.3, LVEF 41%, increased LV wall thickness, mild global LV hypokenesis, grade I diastolic dysfunction, normal RVSF, mild LA enlargement, Mild MR, mild AR,  mild TR,  [de-identified] : 5/27/22 Device:  no arrhythmia, no therapies. AP <1%, BiV pacing 99%. Bi-V optimization done ot the best as LV epicardial lead is not in optimal position.  [de-identified] : \par [facility] 5/6/11: Normal Coronaries

## 2022-07-18 NOTE — HISTORY OF PRESENT ILLNESS
[FreeTextEntry1] : Mr. Camara is a 67 year old male with a PMH chronic systolic HF ACC/AHA stage C, NICM (LVEF 38%, LVIDd 5cm), S/P CRT-D ( with LV epicardial lead)  in 7/2019, covid infection '20, HLD, HTN, DM, lung Nodules, Adrenal Nodule, proteinuria, VitamiN D Deficiency, and Neutropenia who presents today for routine follow up for his cardiomyopathy.\par \par He has followed with Dr. Grady for about 20 years when he was 1st told he had a LBBB. In 2009 was on vacation and felt generalized fatigued and ?arrhythmia. At that time had reduced LVEF of 20% so  and underwent Biv ICD  implant. He was started on BB and baby ASA. He has had longstanding follow up and management of his HF with Dr. Grady. It seems he was on Benicar but at some point it was discontinued due to lightheadedness.  He's had serial  TTEs in AllScripts which revealed LVEF ~40-45%. His most recent TTE was done on 5/26 and it showed LVEF 38%. He has been started on Entresto recently [facility] then. He also underwent nuclear stress testing and prelim report indicates mildly dilated LV with normal perfusion. Pt denies any recent HF hospitalizations or ED visits. His physical activity is limited by sciatica pain. No h/o ICD shocks. \par \par Since his telehealth visit with HF NP on 6/1, he's been tolerating the increase in Entrest to moderate dose without issue. His weight at home has been ranging 174-177lbs, 174.5lbs today. His BP has been ranging 112//71. He's completed physical therapy which he was doing for his back with improvement in back pain although at times get sciatic pain in his legs. He states his AT has remained the same and he is able to walk about 1.5-2 miles without dyspnea which he does a few times a week. He's able to climb a flight of stairs without SOB. \par \par He reports no overt heart failure symptoms, specifically denies orthopnea, PND, bendopnea, LE edema, chest discomfort, dizziness/lightheadedness, palpitations or syncope. Patient denies any recent ICD shocks. No recent hospitalizations or visits to the ED. He's been taking his medications regularly as prescribed. He limits the sodium in his diet and drinks about 64 fl oz per day.\par

## 2022-07-21 ENCOUNTER — APPOINTMENT (OUTPATIENT)
Dept: ENDOCRINOLOGY | Facility: CLINIC | Age: 67
End: 2022-07-21

## 2022-07-27 ENCOUNTER — NON-APPOINTMENT (OUTPATIENT)
Age: 67
End: 2022-07-27

## 2022-07-28 ENCOUNTER — APPOINTMENT (OUTPATIENT)
Dept: ENDOCRINOLOGY | Facility: CLINIC | Age: 67
End: 2022-07-28

## 2022-07-28 VITALS
WEIGHT: 179 LBS | SYSTOLIC BLOOD PRESSURE: 122 MMHG | HEART RATE: 82 BPM | HEIGHT: 66 IN | DIASTOLIC BLOOD PRESSURE: 78 MMHG | TEMPERATURE: 98.5 F | OXYGEN SATURATION: 98 % | BODY MASS INDEX: 28.77 KG/M2

## 2022-07-28 LAB — GLUCOSE BLDC GLUCOMTR-MCNC: 110

## 2022-07-28 PROCEDURE — 95251 CONT GLUC MNTR ANALYSIS I&R: CPT

## 2022-07-28 PROCEDURE — 99213 OFFICE O/P EST LOW 20 MIN: CPT | Mod: 25

## 2022-07-28 NOTE — HISTORY OF PRESENT ILLNESS
[FreeTextEntry1] : Mr. LO is a 67 year old male who returns today for endocrine reevaluation. He returns with regard to a history of type 2 dm. He continues on Metformin Er 750 mg daily and farxiga 10 mg\par HGM has been running in the low 100s throughout the day.\par Denies any significant episodes of hypoglycemia. Denies any hx of microvascular complications. Ophthalmologic visit NOT up to date, pending follow up appointment . \par POCT A1c returned at 7.7% on 7/14/22, previously 7.4% on 03/24/2022.\par POCT glucose returned today at 110 mg/dL \par \par Patient did go to the [facility] as per Dr. Grady 1 month ago due to low EF (38% from usual 50%) and weakness. Entresto was increased per hospital and pacemaker was manipulated and farxiga 10 mg was introduced. Pending repeat echo on 7/18/22 with HF team. This happened 2-3 days after covid booster vaccine. At this time, patient is feeling better and asymptomatic \par Patient with stable adrenal nodule since 2009. \par \par Patient too with history of a nodular thyroid. 7/8/2021 thyroid u/s indicated scattered colloid cysts measuring up to 4 mm in the midpole of the right lobe. In the left lobe there is a 4 mm colloid cyst in the lower pole. \par \par Additional history includes that of htn and hyperlipidemia along with cardiomyopathy, AICD and Pos for covid March 2020 without residual sequelae. History of Vitamin d d deficiency.\par hx of laminectomy x 2\par takes vit d 1000 IU and vit c and three in one calc mg and zinc. Is taking entresto, carvedilol, livalo 4 mg. \par Patient notes sciatica- had epidural injection 4 months ago and is undergoing PT. \par Rx Livalo (has been out for 2 weeks), Olmesartan, gabapentin and prn Tadalafil

## 2022-08-02 ENCOUNTER — NON-APPOINTMENT (OUTPATIENT)
Age: 67
End: 2022-08-02

## 2022-08-02 LAB
ANION GAP SERPL CALC-SCNC: 12 MMOL/L
BUN SERPL-MCNC: 15 MG/DL
CALCIUM SERPL-MCNC: 9.4 MG/DL
CHLORIDE SERPL-SCNC: 106 MMOL/L
CO2 SERPL-SCNC: 20 MMOL/L
CREAT SERPL-MCNC: 0.98 MG/DL
EGFR: 85 ML/MIN/1.73M2
GLUCOSE SERPL-MCNC: 123 MG/DL
MAGNESIUM SERPL-MCNC: 2 MG/DL
NT-PROBNP SERPL-MCNC: 81 PG/ML
POTASSIUM SERPL-SCNC: 4.2 MMOL/L
SODIUM SERPL-SCNC: 139 MMOL/L

## 2022-08-16 ENCOUNTER — NON-APPOINTMENT (OUTPATIENT)
Age: 67
End: 2022-08-16

## 2022-08-16 LAB
ANION GAP SERPL CALC-SCNC: 13 MMOL/L
BUN SERPL-MCNC: 18 MG/DL
CALCIUM SERPL-MCNC: 10 MG/DL
CHLORIDE SERPL-SCNC: 104 MMOL/L
CO2 SERPL-SCNC: 22 MMOL/L
CREAT SERPL-MCNC: 1.12 MG/DL
EGFR: 72 ML/MIN/1.73M2
GLUCOSE SERPL-MCNC: 99 MG/DL
POTASSIUM SERPL-SCNC: 4.4 MMOL/L
SODIUM SERPL-SCNC: 138 MMOL/L

## 2022-08-26 ENCOUNTER — NON-APPOINTMENT (OUTPATIENT)
Age: 67
End: 2022-08-26

## 2022-08-29 ENCOUNTER — APPOINTMENT (OUTPATIENT)
Dept: HEART FAILURE | Facility: CLINIC | Age: 67
End: 2022-08-29

## 2022-08-29 ENCOUNTER — RX RENEWAL (OUTPATIENT)
Age: 67
End: 2022-08-29

## 2022-08-29 VITALS
DIASTOLIC BLOOD PRESSURE: 75 MMHG | HEIGHT: 66 IN | HEART RATE: 74 BPM | OXYGEN SATURATION: 96 % | TEMPERATURE: 98.1 F | BODY MASS INDEX: 27.48 KG/M2 | SYSTOLIC BLOOD PRESSURE: 125 MMHG | WEIGHT: 171 LBS

## 2022-08-29 DIAGNOSIS — I50.20 UNSPECIFIED SYSTOLIC (CONGESTIVE) HEART FAILURE: ICD-10-CM

## 2022-08-29 PROCEDURE — 99214 OFFICE O/P EST MOD 30 MIN: CPT

## 2022-08-31 ENCOUNTER — APPOINTMENT (OUTPATIENT)
Dept: CARDIOLOGY | Facility: CLINIC | Age: 67
End: 2022-08-31

## 2022-08-31 ENCOUNTER — NON-APPOINTMENT (OUTPATIENT)
Age: 67
End: 2022-08-31

## 2022-08-31 ENCOUNTER — APPOINTMENT (OUTPATIENT)
Dept: ELECTROPHYSIOLOGY | Facility: CLINIC | Age: 67
End: 2022-08-31

## 2022-08-31 VITALS — SYSTOLIC BLOOD PRESSURE: 108 MMHG | HEART RATE: 78 BPM | DIASTOLIC BLOOD PRESSURE: 81 MMHG | OXYGEN SATURATION: 96 %

## 2022-08-31 VITALS
HEART RATE: 80 BPM | WEIGHT: 174 LBS | TEMPERATURE: 98.7 F | SYSTOLIC BLOOD PRESSURE: 102 MMHG | DIASTOLIC BLOOD PRESSURE: 62 MMHG | OXYGEN SATURATION: 96 % | HEIGHT: 66 IN | BODY MASS INDEX: 27.97 KG/M2

## 2022-08-31 PROCEDURE — 93284 PRGRMG EVAL IMPLANTABLE DFB: CPT

## 2022-08-31 PROCEDURE — 99213 OFFICE O/P EST LOW 20 MIN: CPT | Mod: 25

## 2022-08-31 PROCEDURE — 93000 ELECTROCARDIOGRAM COMPLETE: CPT

## 2022-08-31 NOTE — HISTORY OF PRESENT ILLNESS
[FreeTextEntry1] : This is a 66yo male who presents today for follow-up. Patient states he has been doing well, was recently seen by Dr. Kay. (Heart Failure) and Spironolactone was recently increased to 25mg daily.  He saw Dr. Carbone prior to today's appointment. He feels overall well with no acute complaints or concerns. Denies chest pain, dyspnea, dizziness, palpations, changes in appetite/bowel habits, nausea, vomiting, abdominal pain.\par

## 2022-09-02 NOTE — ASSESSMENT
[FreeTextEntry1] : #Chronic systolic HF ACC/AHA stage C, NIYHA II symptoms\par In setting of NICMP LVEF 38%, LVIDd 5cm. As per records had LVEF ~20% in 2009 which then recovered to 40-50%. \par Clinically euvolemic and normotensive today in clinic with warm extremities. \par Labs from 8/11/22  Na 138, K+ 4.4, BUN/Scr 18/1.1. Pro BNP 81 (7/27) \par GDMT: Continue Farxiga 10mg daily, Coreg 25mg BID, Entresto  mg BID. Will increase [place] to 25mg daily starting today. Will repeat labs in 7-14 days. \par Diuretics: euvolemic off loop diuretics\par Device: s/p CRT-D (LV epicardial lead, not in ideal position) Bi-v 99%. No shocks. \par provided with list of Cardiac rehab centers for referral. He will notify our office of which he would prefer to attend\par HF education provided including lifestyle changes (low Na diet, fluid restriction, increase physical activity), current clinical condition, natural progression and prognosis \par \par \par #HTN\par Controlled on above GDMT\par \par #Nonobstructive CAD\par ASA/Livalo\par \par #DM\par A1c 7.4\par Metformin\par currently on SGLT2\par \par \par RTC in 4 Months with Dr. Kay

## 2022-09-02 NOTE — END OF VISIT
[FreeTextEntry3] :  IKailey MD independently performed a history and physical examination of the patient, and formulated the management plan.\par I have reviewed the note by NIGEL Gonzales and agree with the documented findings and plan of care.\par  [Time Spent: ___ minutes] : I have spent [unfilled] minutes of time on the encounter.

## 2022-09-02 NOTE — PHYSICAL EXAM
[No Edema] : no edema [No Acute Distress] : no acute distress [Good Air Entry] : good air entry [Soft] : abdomen soft [Normal Gait] : normal gait [No Rash] : no rash [Moves all extremities] : moves all extremities [Alert and Oriented] : alert and oriented [de-identified] : JVP ~6-8 cm of H20 [de-identified] : warm peripherally

## 2022-09-02 NOTE — CARDIOLOGY SUMMARY
[de-identified] : 5/27/22 SR. V-paced [de-identified] : \par 5/2022 TTE: LVEF 38% LVIDd 4.98cm, Grade I DD, mild AI, RV nl size/function, RVSP 27mmHg, dilated Asc Ao 3.9cm\par \par 4/6/21 TTE: LVIDd 5.3, LVEF 41%, increased LV wall thickness, mild global LV hypokenesis, grade I diastolic dysfunction, normal RVSF, mild LA enlargement, Mild MR, mild AR,  mild TR,  [de-identified] : 5/27/22 Device:  no arrhythmia, no therapies. AP <1%, BiV pacing 99%. Bi-V optimization done ot the best as LV epicardial lead is not in optimal position.  [de-identified] : \par [facility] 5/6/11: Normal Coronaries

## 2022-09-02 NOTE — HISTORY OF PRESENT ILLNESS
[FreeTextEntry1] : Mr. Camara is a 67 year old male with a PMH chronic systolic HF ACC/AHA stage C, NICM (LVEF 38%, LVIDd 5cm), S/P CRT-D ( with LV epicardial lead)  in 7/2019, covid infection '20, HLD, HTN, DM, lung Nodules, Adrenal Nodule, proteinuria, VitamiN D Deficiency, and Neutropenia, who presents for routine follow-up of his cardiomyopathy \par \par He has followed with Dr. Grady for about 20 years when he was 1st told he had a LBBB. In 2009 was on vacation and felt generalized fatigued and ?arrhythmia. At that time had reduced LVEF of 20% so  and underwent Biv ICD  implant. He was started on BB and baby ASA. He has had longstanding follow up and management of his HF with Dr. Grady. It seems he was on Benicar but at some point it was discontinued due to lightheadedness.  He's had serial  TTEs in AllScripts which revealed LVEF ~40-45%. His most recent TTE was done on 5/26 and it showed LVEF 38%. He has been started on Entresto recently [facility] then. He also underwent nuclear stress testing and prelim report indicates mildly dilated LV with normal perfusion. Pt denies any recent HF hospitalizations or ED visits. His physical activity is limited by sciatica pain.  No h/o ICD shocks. \par \par Since last seen in June he has had follow up in NP clinic and has tolerated maximization of Entresto and induction of MRA, with stable renal function and normal K+. Recently has had marginal SBP readings thought to be in setting of mild hypovolemia. Therefore, was instructed to increase his oral fluid intake. \par \par He presents to clinic today and states since last seen he has felt well. He reports resolutions of LH/Dizziness with improvement in BP readings since staggering ARNI and BB doses. He endorses an AT of ~3-4 blocks without limitations and completes ~1600 k steps daily. His home weights have ranged ~170-175 lbs and home BP readings recently have ranged 120's/70's. He has not began Cardia Rehab as of yet but states he is interested. \par \par He reports no change in appetite. He denies CP, palpitations, syncope, LH/dizziness, orthopnea, PND, abdominal discomfort, and LE edema. He has been limiting fluid and sodium in his diet and taking his medications as directed. He does not use NSAIDs. His ICD has not discharged and he has not been admitted to the hospital or seen in the ER for HF in the interim. \par \par

## 2022-09-07 ENCOUNTER — APPOINTMENT (OUTPATIENT)
Dept: ORTHOPEDIC SURGERY | Facility: CLINIC | Age: 67
End: 2022-09-07

## 2022-09-07 ENCOUNTER — NON-APPOINTMENT (OUTPATIENT)
Age: 67
End: 2022-09-07

## 2022-09-07 VITALS
DIASTOLIC BLOOD PRESSURE: 63 MMHG | BODY MASS INDEX: 27.28 KG/M2 | SYSTOLIC BLOOD PRESSURE: 101 MMHG | HEART RATE: 73 BPM | WEIGHT: 169 LBS

## 2022-09-07 DIAGNOSIS — M51.26 OTHER INTERVERTEBRAL DISC DISPLACEMENT, LUMBAR REGION: ICD-10-CM

## 2022-09-07 DIAGNOSIS — M54.16 RADICULOPATHY, LUMBAR REGION: ICD-10-CM

## 2022-09-07 PROCEDURE — 72110 X-RAY EXAM L-2 SPINE 4/>VWS: CPT

## 2022-09-07 PROCEDURE — 72170 X-RAY EXAM OF PELVIS: CPT

## 2022-09-07 PROCEDURE — 99072 ADDL SUPL MATRL&STAF TM PHE: CPT

## 2022-09-07 PROCEDURE — 99214 OFFICE O/P EST MOD 30 MIN: CPT

## 2022-09-07 NOTE — DISCUSSION/SUMMARY
[Medication Risks Reviewed] : Medication risks reviewed [de-identified] : Patient presents with increasing left leg pain over the past few weeks.  He also has some weakness of his knee extension EHL.  He has also reported urinary frequency and possible urge incontinence.  He is on multiple medications for his cardiomyopathy and I recommended discussed that including his diuretics with his cardiologist.  Given the severity of his symptoms I recommended a CT myelogram of the lumbar spine to better assess for spinal stenosis and neural compression.  He cannot get an MRI because of the pacemaker.  Further treatment options can be discussed based on the CT myelogram findings.  In the meantime strongly recommended he take the gabapentin 300 mg nightly that has been previously prescribed for him.  If he does not have any medications will call the office for refill.  A referral to neurologist was also recommended to better assess the neural compression and radicular pain complaints that he is reporting.  Nerve conduction studies and EMG would be helpful to assess for any active denervation need for surgical intervention if appropriate.\par \par The patient was educated regarding their condition, treatment options as well as prescribed course of treatment. \par Risks and benefits as well as alternatives to the proposed treatment were also provided to the patient \par They were given the opportunity to have all their questions answered to their satisfaction.\par \par Vital signs were reviewed with the patient and the patient was instructed to followup with their primary care provider for further management. There were no PAs or scribes used in the evaluation, exam or treatment plan discussion. The surgeon was the primary evaluating or treating physician as noted above.

## 2022-09-07 NOTE — HISTORY OF PRESENT ILLNESS
[de-identified] : Patient presents today with complaints of low back pain that radiates to bilateral ankles.  Pain has recently increased in frequency and severity in the past few weeks; no known injury.  Patient states the pain is affecting the way he sleeps and has to sleep in the fetal position on the right side.  Complains of tingling and burning sensation to left foot.  Patient states his left knee alejandra at times but denies falls.  Complaints of throbbing sensation to left calf. Pain radiates down left buttock, posterior thigh and anterolateral tibia.\par Patient has noticed some changes to urinary patterns over the past 3 weeks- states he does not realize he has to urinate until he has to definitely go to the bathroom.\par Patient stopped going to physical therapy about 1 month ago; helped somewhat.\par Patient has been seeing a cardiologist due to cardiomyelopathy.\heather Is on ASA 81 mg

## 2022-09-07 NOTE — REASON FOR VISIT
[Follow-Up Visit] : a follow-up visit for [Workers' Comp: Date of Injury: _______] : This visit is related to worker's compensation. Date of Injury: [unfilled] [Back Pain] : back pain [Radiculopathy] : radiculopathy [FreeTextEntry2] : DDD

## 2022-09-07 NOTE — PHYSICAL EXAM
[Stooped] : stooped [Antalgic] : antalgic [SLR] : positive straight leg raise [] : Motor: [NL] : Motor strength of the left lower extremity was normal [___/5] : left ([unfilled]/5) [Motor Strength Lower Extremities] : right (5/5) [LE] : Sensory: Intact in bilateral lower extremities [1+] : right patella 1+ [2+] : left ankle jerk 2+ [DP] : dorsalis pedis 2+ and symmetric bilaterally [PT] : posterior tibial 2+ and symmetric bilaterally [de-identified] : 4 views lumbar spine obtained today demonstrate no significant scoliosis.  Straightening of lumbar lordosis.  Significant degeneration seen at L4-5 which is a site of prior surgery with bridging anterior osteophyte which is unchanged when compared with prior x-rays.  No dynamic instability between flexion-extension.  No obvious acute fractures.\par \par Normal appearance of the hips bilaterally.  No acute fractures.  No significant degeneration. [Poor Appearance] : well-appearing [Acute Distress] : not in acute distress [Obese] : not obese [Abl Mood] : in a normal mood [Abl Affect] : with normal affect [Poor Coordination] : normal coordination [Disorientation] : oriented x 3 [Plantar Reflex Right Only] : absent on the right [Plantar Reflex Left Only] : absent on the left [DTR Reflexes Clonus Of Right Ankle (___ Beats)] : absent on the right [DTR Reflexes Clonus Of Left Ankle (___ Beats)] : absent on the left [FreeTextEntry2] : The pt is awake, alert and oriented to self, place and time, is comfortable but in no acute distress. Gait examination reveals a narrow based, non-ataxic, non-antalgic gait. Can heel and toe walk without difficulty. Inspection of neck, back and lower extremities bilaterally reveals no rashes or ecchymotic lesions.  There is no obvious abnormal spinal curvature in the sagittal and coronal planes. There is tenderness over the lumbar spine in the midline as well as in the right paraspinal musculature more than the left. There is no sacroiliac tenderness. No greater trochanteric tenderness bilaterally. No atrophy or abnormal movements noted in the upper or lower extremities. There is no swelling noted in the upper or lower extremities bilaterally. No cervical lymphadenopathy noted anteriorly.\par Lumbar spine range of motion is restricted with forward flexion of 40° and extension of 25° with extension more painful than flexion.  \par Negative straight leg raise to 45° in the supine position bilaterally with primarily low back pain noted. There is no groin pain with hip internal rotation and a negative JAMIL test bilaterally.  [de-identified] : well-healed lumbar midline incision from prior surgery

## 2022-09-07 NOTE — ASSESSMENT
[Indicate if, in your opinion, the incident that the patient described was the competent medical cause of this injury/illness.] : The incident that the patient described was the competent medical cause of this injury/illness: Yes [Indicate if the patient's complaints are consistent with his/her history of the injury/illness.] : Indicate if the patient's complaints are consistent with his/her history of the injury/illness: Yes [Yes] : Yes, it is consistent [Can the patient return to usual work activities as indicated? If yes, indicate date___] : The patient cannot return to usual work activities as indicated. [FreeTextEntry5] : 100 [Physical Disability Permanent Partial] : permanently partial disabled

## 2022-09-15 LAB
ALBUMIN SERPL ELPH-MCNC: 4.3 G/DL
ALP BLD-CCNC: 38 U/L
ALT SERPL-CCNC: 8 U/L
ANION GAP SERPL CALC-SCNC: 10 MMOL/L
AST SERPL-CCNC: 16 U/L
BILIRUB SERPL-MCNC: 0.5 MG/DL
BUN SERPL-MCNC: 19 MG/DL
CALCIUM SERPL-MCNC: 9.7 MG/DL
CHLORIDE SERPL-SCNC: 105 MMOL/L
CO2 SERPL-SCNC: 24 MMOL/L
CREAT SERPL-MCNC: 0.98 MG/DL
EGFR: 85 ML/MIN/1.73M2
GLUCOSE SERPL-MCNC: 107 MG/DL
POTASSIUM SERPL-SCNC: 4.4 MMOL/L
PROT SERPL-MCNC: 6.8 G/DL
SODIUM SERPL-SCNC: 139 MMOL/L

## 2022-09-29 ENCOUNTER — APPOINTMENT (OUTPATIENT)
Dept: CT IMAGING | Facility: CLINIC | Age: 67
End: 2022-09-29

## 2022-10-01 ENCOUNTER — OUTPATIENT (OUTPATIENT)
Dept: OUTPATIENT SERVICES | Facility: HOSPITAL | Age: 67
LOS: 1 days | End: 2022-10-01
Payer: COMMERCIAL

## 2022-10-01 DIAGNOSIS — Z95.810 PRESENCE OF AUTOMATIC (IMPLANTABLE) CARDIAC DEFIBRILLATOR: Chronic | ICD-10-CM

## 2022-10-01 DIAGNOSIS — Z11.52 ENCOUNTER FOR SCREENING FOR COVID-19: ICD-10-CM

## 2022-10-01 DIAGNOSIS — S55.001A UNSPECIFIED INJURY OF ULNAR ARTERY AT FOREARM LEVEL, RIGHT ARM, INITIAL ENCOUNTER: Chronic | ICD-10-CM

## 2022-10-01 DIAGNOSIS — Z98.89 OTHER SPECIFIED POSTPROCEDURAL STATES: Chronic | ICD-10-CM

## 2022-10-01 LAB — SARS-COV-2 RNA SPEC QL NAA+PROBE: SIGNIFICANT CHANGE UP

## 2022-10-01 PROCEDURE — U0003: CPT

## 2022-10-01 PROCEDURE — C9803: CPT

## 2022-10-01 PROCEDURE — U0005: CPT

## 2022-10-04 ENCOUNTER — APPOINTMENT (OUTPATIENT)
Dept: RADIOLOGY | Facility: HOSPITAL | Age: 67
End: 2022-10-04

## 2022-10-17 ENCOUNTER — OUTPATIENT (OUTPATIENT)
Dept: OUTPATIENT SERVICES | Facility: HOSPITAL | Age: 67
LOS: 1 days | End: 2022-10-17
Payer: COMMERCIAL

## 2022-10-17 DIAGNOSIS — Z98.89 OTHER SPECIFIED POSTPROCEDURAL STATES: Chronic | ICD-10-CM

## 2022-10-17 DIAGNOSIS — Z11.52 ENCOUNTER FOR SCREENING FOR COVID-19: ICD-10-CM

## 2022-10-17 DIAGNOSIS — S55.001A UNSPECIFIED INJURY OF ULNAR ARTERY AT FOREARM LEVEL, RIGHT ARM, INITIAL ENCOUNTER: Chronic | ICD-10-CM

## 2022-10-17 DIAGNOSIS — Z95.810 PRESENCE OF AUTOMATIC (IMPLANTABLE) CARDIAC DEFIBRILLATOR: Chronic | ICD-10-CM

## 2022-10-17 LAB — SARS-COV-2 RNA SPEC QL NAA+PROBE: SIGNIFICANT CHANGE UP

## 2022-10-17 PROCEDURE — U0005: CPT

## 2022-10-17 PROCEDURE — C9803: CPT

## 2022-10-17 PROCEDURE — U0003: CPT

## 2022-10-20 ENCOUNTER — RESULT REVIEW (OUTPATIENT)
Age: 67
End: 2022-10-20

## 2022-10-20 ENCOUNTER — OUTPATIENT (OUTPATIENT)
Dept: OUTPATIENT SERVICES | Facility: HOSPITAL | Age: 67
LOS: 1 days | End: 2022-10-20
Payer: COMMERCIAL

## 2022-10-20 ENCOUNTER — APPOINTMENT (OUTPATIENT)
Dept: RADIOLOGY | Facility: HOSPITAL | Age: 67
End: 2022-10-20

## 2022-10-20 DIAGNOSIS — Z95.810 PRESENCE OF AUTOMATIC (IMPLANTABLE) CARDIAC DEFIBRILLATOR: Chronic | ICD-10-CM

## 2022-10-20 DIAGNOSIS — S55.001A UNSPECIFIED INJURY OF ULNAR ARTERY AT FOREARM LEVEL, RIGHT ARM, INITIAL ENCOUNTER: Chronic | ICD-10-CM

## 2022-10-20 DIAGNOSIS — M51.25 OTHER INTERVERTEBRAL DISC DISPLACEMENT, THORACOLUMBAR REGION: ICD-10-CM

## 2022-10-20 DIAGNOSIS — Z00.00 ENCOUNTER FOR GENERAL ADULT MEDICAL EXAMINATION WITHOUT ABNORMAL FINDINGS: ICD-10-CM

## 2022-10-20 DIAGNOSIS — Z98.89 OTHER SPECIFIED POSTPROCEDURAL STATES: Chronic | ICD-10-CM

## 2022-10-20 DIAGNOSIS — M51.37 OTHER INTERVERTEBRAL DISC DEGENERATION, LUMBOSACRAL REGION: ICD-10-CM

## 2022-10-20 DIAGNOSIS — M54.16 RADICULOPATHY, LUMBAR REGION: ICD-10-CM

## 2022-10-20 PROCEDURE — 72131 CT LUMBAR SPINE W/O DYE: CPT | Mod: 26

## 2022-10-20 PROCEDURE — 62304 MYELOGRAPHY LUMBAR INJECTION: CPT

## 2022-10-20 PROCEDURE — 72131 CT LUMBAR SPINE W/O DYE: CPT

## 2022-10-31 ENCOUNTER — APPOINTMENT (OUTPATIENT)
Dept: ORTHOPEDIC SURGERY | Facility: CLINIC | Age: 67
End: 2022-10-31

## 2022-10-31 VITALS
BODY MASS INDEX: 27.32 KG/M2 | WEIGHT: 170 LBS | DIASTOLIC BLOOD PRESSURE: 83 MMHG | HEART RATE: 77 BPM | HEIGHT: 66 IN | SYSTOLIC BLOOD PRESSURE: 139 MMHG

## 2022-10-31 DIAGNOSIS — Z98.890 OTHER SPECIFIED POSTPROCEDURAL STATES: ICD-10-CM

## 2022-10-31 DIAGNOSIS — M54.16 RADICULOPATHY, LUMBAR REGION: ICD-10-CM

## 2022-10-31 PROCEDURE — 99072 ADDL SUPL MATRL&STAF TM PHE: CPT

## 2022-10-31 PROCEDURE — 99214 OFFICE O/P EST MOD 30 MIN: CPT | Mod: 57

## 2022-10-31 NOTE — PHYSICAL EXAM
[Stooped] : stooped [Antalgic] : antalgic [SLR] : positive straight leg raise [] : Motor: [NL] : Motor strength of the left lower extremity was normal [___/5] : left ([unfilled]/5) [Motor Strength Lower Extremities] : right (5/5) [LE] : Sensory: Intact in bilateral lower extremities [1+] : right patella 1+ [2+] : left ankle jerk 2+ [DP] : dorsalis pedis 2+ and symmetric bilaterally [PT] : posterior tibial 2+ and symmetric bilaterally [de-identified] : ACC: [id number] EXAM: XR MYELOGRAPHY LUMBOSAC [facility]\par ACC: [id number] EXAM: CT LUMBAR SPINE\par \par PROCEDURE DATE: 10/20/2022\par \par INTERPRETATION: Fluoroscopically guided lumbar myelogram\par \par CLINICAL INDICATION: Status post discectomies, Low back pain, left greater than right lower extremity radiculopathy.\par \par COMPARISON STUDIES: CT lumbar spine 9/22/2021.\par \par FLUOROSCOPY TIME: 30 seconds\par \par TECHNIQUE: The patient was informed of the risks, benefits, and alternatives of the procedure and gave willing consent. The patient was placed prone on the fluoroscopy table. The lower back was prepped and draped in usual sterile fashion. Under fluoroscopic guidance the L2-L3 interspace was localized. 1% Lidocaine anesthetic was administered subcutaneously in this region. Under intermittent fluoroscopic guidance a 22-gauge spinal needle was advanced until its tip was within the thecal sac at the L2-L3 level. 15 cc of Omnipaque 180 was intrathecally injected under intermittent primary fluoroscopic control. 5 cc were discarded.\par \par The spinal needle was withdrawn. The patient tolerated the procedure well and without complication.\par \par AP and shallow/steep oblique views of the lumbar spine were obtained. Then, the patient was transported to the CT suite where direct axial CT scanning of the lumbar spine was obtained. Sagittal and coronal reformats were provided.\par \par FINDINGS:\par \par Status post right L4 and L5 hemilaminectomies.\par \par Vertebral body height and facet alignment are maintained. Straightening of the normal lumbar lordosis.\par \par Mild disc space narrowing at L4-L5.\par \par Conus normal in size, position, and contour, ending at L1-L2.\par \par T11-T12: No spinal canal stenosis or neural foraminal narrowing.\par \par T12-L1: No spinal canal stenosis or neural foraminal narrowing.\par \par L1-L2: No spinal canal stenosis or neural foraminal narrowing.\par \par L2-L3: No spinal canal stenosis. Right foraminal disc protrusion and mild to moderate right neural foraminal narrowing.\par \par L3-L4: Mild disc bulge. No thecal sac compression. Mild bilateral neural foraminal narrowing.\par \par L4-L5: Disc bulge. Mild thecal sac compression. Mild to moderate right and mild left neural foraminal narrowing.\par \par L5-S1: Bilateral thecal sac compression without mass effect upon the descending S1 nerve roots. Moderate left and mild to moderate right neural foraminal narrowing\par \par IMPRESSION:\par \par Multilevel degenerative changes.\par \par Mild spinal canal stenosis at L4-L5 and L5-S1.\par \par L2-L3 mild to moderate right neural foraminal narrowing, L4-L5 mild to moderate right neural foraminal narrowing, L5-S1 moderate left and mild to moderate right neural foraminal narrowing.\par \par --- End of Report ---\par \par MARCO A KING MD; Attending Radiologist\par This document has been electronically signed. Oct 20 2022 3:15PM [Poor Appearance] : well-appearing [Acute Distress] : not in acute distress [Obese] : not obese [Abl Mood] : in a normal mood [Abl Affect] : with normal affect [Poor Coordination] : normal coordination [Disorientation] : oriented x 3 [Plantar Reflex Right Only] : absent on the right [Plantar Reflex Left Only] : absent on the left [DTR Reflexes Clonus Of Right Ankle (___ Beats)] : absent on the right [DTR Reflexes Clonus Of Left Ankle (___ Beats)] : absent on the left [FreeTextEntry2] : The pt is awake, alert and oriented to self, place and time, is comfortable but in no acute distress. Gait examination reveals a narrow based, non-ataxic, non-antalgic gait. Can heel and toe walk without difficulty. Inspection of neck, back and lower extremities bilaterally reveals no rashes or ecchymotic lesions.  There is no obvious abnormal spinal curvature in the sagittal and coronal planes. There is tenderness over the lumbar spine in the midline as well as in the right paraspinal musculature more than the left. There is no sacroiliac tenderness. No greater trochanteric tenderness bilaterally. No atrophy or abnormal movements noted in the upper or lower extremities. There is no swelling noted in the upper or lower extremities bilaterally. No cervical lymphadenopathy noted anteriorly.\par Lumbar spine range of motion is restricted with forward flexion of 40° and extension of 25° with extension more painful than flexion.  \par Negative straight leg raise to 45° in the supine position bilaterally with primarily low back pain noted. There is no groin pain with hip internal rotation and a negative JAMIL test bilaterally.  [de-identified] : seen with his wife\par well-healed lumbar midline incision from prior surgery

## 2022-10-31 NOTE — DISCUSSION/SUMMARY
[Medication Risks Reviewed] : Medication risks reviewed [Surgical risks reviewed] : Surgical risks reviewed [de-identified] : CT myelogram performed previously was independently reviewed by me and findings discussed with the patient and his wife.  He has what appears to be degenerative changes at L4-5 disc protrusion at that level.  Based on my evaluation his pathology appears to be localized to the L4-5 with thecal sac compression and foraminal stenosis with disc protrusion noted.  He has a history of prior decompression surgery at that level.  Based on progressive nature of his symptoms as well as increasing pain down his right leg as well in the L4 distribution with pain in the left leg in the L5 distribution recommended transforaminal lumbar interbody fusion at L4-5 with posterior spinal fusion instrumentation and decompression.\par \par The patient was advised that based upon their clinical presentation and radiographic findings they meet inclusion criteria for spinal surgery to address their spinal pathology.\par The spectrum of treatments for their spinal condition were reviewed in detail. The goals, alternatives, risks and benefits of spinal surgery were reviewed in detail with the patient.  \par Benefits and risks of operative and nonoperative treatment for the patient's pathology were outlined at length with the patient.  Specifically, those risks are understood to be, but not limited to, bleeding, infection, fracture (both during surgery and after surgery), adjacent level disease, failure to heal (significantly increased by smoking), need for further surgery, failure of instrumentation (if used), recurrence of problem and symptoms, neurovascular injury, dural tears or leaks resulting in spinal fluid leakage and requiring additional invasive and non-invasive treatments, need for additional procedures, possible paralysis resulting in loss of use of arms, legs, bowel and bladder function as well as sexual dysfunction, and anesthetic risks including death. \par Available alternatives to surgery were also discussed with the patient. In addition, the patient further understands that there may be indicated need for somatosensory evoked potential monitoring, real-time EMG monitoring, and motor evoked potential monitoring during the procedure along with placement of needle electrodes. A neuromonitoring service will discuss the risks and benefits separately with the patient.\par The patient also understands that having a surgical procedure and being hospitalized carries risks in addition to the ones described above.\par \par The patient was advised that Dr. Sneed operates as a surgical team with his associate Dr. Franco and/or with surgical Physician Assistants ([place])\par \par The patient was advised that they will require a medical preoperative risk evaluation by their PCP. Further medical subspecialty clearances such as cardiac may be indicated if felt needed by their PCP.\par \par The patient was advised he/she may call our office after careful consideration of their treatment options and further consultation with any other physician to begin the process of preoperative planning for elective spinal surgery at a time of their choosing. \par The patient verbalized an understanding of the procedure, its indications, and its common potential complications and attendant risks, and is willing to proceed. No guarantees were made with regard to a complete recovery. We will proceed in a timely manner after obtaining medical clearance.

## 2022-10-31 NOTE — REASON FOR VISIT
[Follow-Up Visit] : a follow-up visit for [Workers' Comp: Date of Injury: _______] : This visit is related to worker's compensation. Date of Injury: [unfilled] [Degenerative Joint Disease] : degenerative joint disease [Herniated Discs] : herniated discs [Back Pain] : back pain [Radiculopathy] : radiculopathy [Spouse] : spouse

## 2022-10-31 NOTE — HISTORY OF PRESENT ILLNESS
[FreeTextEntry1] : Patient is here today to review cat myelogram lumbar spine 10/20/22. Patient states pain is increasing limited walking and sleeping at this time pan level 8-9 using cane and back brace to ambulate at this time. [Has the patient missed work because of the injury/illness?] : The patient has missed work because of the injury/illness. [No] : The patient is currently not working.

## 2022-11-14 ENCOUNTER — NON-APPOINTMENT (OUTPATIENT)
Age: 67
End: 2022-11-14

## 2022-11-14 ENCOUNTER — APPOINTMENT (OUTPATIENT)
Dept: ELECTROPHYSIOLOGY | Facility: CLINIC | Age: 67
End: 2022-11-14

## 2022-11-14 PROCEDURE — 93296 REM INTERROG EVL PM/IDS: CPT

## 2022-11-14 PROCEDURE — 93295 DEV INTERROG REMOTE 1/2/MLT: CPT

## 2022-11-15 ENCOUNTER — APPOINTMENT (OUTPATIENT)
Dept: ENDOCRINOLOGY | Facility: CLINIC | Age: 67
End: 2022-11-15

## 2022-11-15 VITALS
OXYGEN SATURATION: 97 % | WEIGHT: 172 LBS | HEART RATE: 82 BPM | RESPIRATION RATE: 16 BRPM | BODY MASS INDEX: 27.64 KG/M2 | HEIGHT: 66 IN | DIASTOLIC BLOOD PRESSURE: 72 MMHG | SYSTOLIC BLOOD PRESSURE: 122 MMHG | TEMPERATURE: 98.5 F

## 2022-11-15 LAB
GLUCOSE BLDC GLUCOMTR-MCNC: 121
HBA1C MFR BLD HPLC: 6.8

## 2022-11-15 PROCEDURE — 83036 HEMOGLOBIN GLYCOSYLATED A1C: CPT | Mod: QW

## 2022-11-15 PROCEDURE — 82962 GLUCOSE BLOOD TEST: CPT

## 2022-11-15 PROCEDURE — 99214 OFFICE O/P EST MOD 30 MIN: CPT

## 2022-11-17 ENCOUNTER — NON-APPOINTMENT (OUTPATIENT)
Age: 67
End: 2022-11-17

## 2022-11-17 ENCOUNTER — OUTPATIENT (OUTPATIENT)
Dept: OUTPATIENT SERVICES | Facility: HOSPITAL | Age: 67
LOS: 1 days | End: 2022-11-17
Payer: COMMERCIAL

## 2022-11-17 VITALS
SYSTOLIC BLOOD PRESSURE: 120 MMHG | WEIGHT: 169.98 LBS | DIASTOLIC BLOOD PRESSURE: 72 MMHG | OXYGEN SATURATION: 98 % | HEIGHT: 66 IN | HEART RATE: 72 BPM | TEMPERATURE: 97 F | RESPIRATION RATE: 16 BRPM

## 2022-11-17 DIAGNOSIS — M51.36 OTHER INTERVERTEBRAL DISC DEGENERATION, LUMBAR REGION: ICD-10-CM

## 2022-11-17 DIAGNOSIS — S55.001A UNSPECIFIED INJURY OF ULNAR ARTERY AT FOREARM LEVEL, RIGHT ARM, INITIAL ENCOUNTER: Chronic | ICD-10-CM

## 2022-11-17 DIAGNOSIS — M54.16 RADICULOPATHY, LUMBAR REGION: ICD-10-CM

## 2022-11-17 DIAGNOSIS — Z95.810 PRESENCE OF AUTOMATIC (IMPLANTABLE) CARDIAC DEFIBRILLATOR: Chronic | ICD-10-CM

## 2022-11-17 DIAGNOSIS — Z01.818 ENCOUNTER FOR OTHER PREPROCEDURAL EXAMINATION: ICD-10-CM

## 2022-11-17 DIAGNOSIS — Z98.890 OTHER SPECIFIED POSTPROCEDURAL STATES: ICD-10-CM

## 2022-11-17 DIAGNOSIS — Z98.89 OTHER SPECIFIED POSTPROCEDURAL STATES: Chronic | ICD-10-CM

## 2022-11-17 LAB
A1C WITH ESTIMATED AVERAGE GLUCOSE RESULT: 6.8 % — HIGH (ref 4–5.6)
ALBUMIN SERPL ELPH-MCNC: 4 G/DL — SIGNIFICANT CHANGE UP (ref 3.3–5)
ALP SERPL-CCNC: 44 U/L — SIGNIFICANT CHANGE UP (ref 30–120)
ALT FLD-CCNC: 14 U/L DA — SIGNIFICANT CHANGE UP (ref 10–60)
ANION GAP SERPL CALC-SCNC: 7 MMOL/L — SIGNIFICANT CHANGE UP (ref 5–17)
APTT BLD: 29.7 SEC — SIGNIFICANT CHANGE UP (ref 27.5–35.5)
AST SERPL-CCNC: 20 U/L — SIGNIFICANT CHANGE UP (ref 10–40)
BILIRUB SERPL-MCNC: 0.6 MG/DL — SIGNIFICANT CHANGE UP (ref 0.2–1.2)
BLD GP AB SCN SERPL QL: SIGNIFICANT CHANGE UP
BUN SERPL-MCNC: 17 MG/DL — SIGNIFICANT CHANGE UP (ref 7–23)
CALCIUM SERPL-MCNC: 9.7 MG/DL — SIGNIFICANT CHANGE UP (ref 8.4–10.5)
CHLORIDE SERPL-SCNC: 103 MMOL/L — SIGNIFICANT CHANGE UP (ref 96–108)
CO2 SERPL-SCNC: 28 MMOL/L — SIGNIFICANT CHANGE UP (ref 22–31)
CREAT SERPL-MCNC: 1.09 MG/DL — SIGNIFICANT CHANGE UP (ref 0.5–1.3)
EGFR: 74 ML/MIN/1.73M2 — SIGNIFICANT CHANGE UP
ESTIMATED AVERAGE GLUCOSE: 148 MG/DL — HIGH (ref 68–114)
GLUCOSE SERPL-MCNC: 130 MG/DL — HIGH (ref 70–99)
HCT VFR BLD CALC: 47.2 % — SIGNIFICANT CHANGE UP (ref 39–50)
HGB BLD-MCNC: 16.1 G/DL — SIGNIFICANT CHANGE UP (ref 13–17)
INR BLD: 0.99 RATIO — SIGNIFICANT CHANGE UP (ref 0.88–1.16)
MCHC RBC-ENTMCNC: 29.5 PG — SIGNIFICANT CHANGE UP (ref 27–34)
MCHC RBC-ENTMCNC: 34.1 GM/DL — SIGNIFICANT CHANGE UP (ref 32–36)
MCV RBC AUTO: 86.4 FL — SIGNIFICANT CHANGE UP (ref 80–100)
NRBC # BLD: 0 /100 WBCS — SIGNIFICANT CHANGE UP (ref 0–0)
PLATELET # BLD AUTO: 200 K/UL — SIGNIFICANT CHANGE UP (ref 150–400)
POTASSIUM SERPL-MCNC: 4.5 MMOL/L — SIGNIFICANT CHANGE UP (ref 3.5–5.3)
POTASSIUM SERPL-SCNC: 4.5 MMOL/L — SIGNIFICANT CHANGE UP (ref 3.5–5.3)
PROT SERPL-MCNC: 7.7 G/DL — SIGNIFICANT CHANGE UP (ref 6–8.3)
PROTHROM AB SERPL-ACNC: 11.7 SEC — SIGNIFICANT CHANGE UP (ref 10.5–13.4)
RBC # BLD: 5.46 M/UL — SIGNIFICANT CHANGE UP (ref 4.2–5.8)
RBC # FLD: 14.3 % — SIGNIFICANT CHANGE UP (ref 10.3–14.5)
SODIUM SERPL-SCNC: 138 MMOL/L — SIGNIFICANT CHANGE UP (ref 135–145)
WBC # BLD: 2.57 K/UL — LOW (ref 3.8–10.5)
WBC # FLD AUTO: 2.57 K/UL — LOW (ref 3.8–10.5)

## 2022-11-17 PROCEDURE — 93005 ELECTROCARDIOGRAM TRACING: CPT

## 2022-11-17 PROCEDURE — G0463: CPT

## 2022-11-17 PROCEDURE — 93010 ELECTROCARDIOGRAM REPORT: CPT

## 2022-11-17 RX ORDER — DEXTROSE 50 % IN WATER 50 %
12.5 SYRINGE (ML) INTRAVENOUS ONCE
Refills: 0 | Status: DISCONTINUED | OUTPATIENT
Start: 2022-12-01 | End: 2022-12-02

## 2022-11-17 RX ORDER — DEXTROSE 50 % IN WATER 50 %
15 SYRINGE (ML) INTRAVENOUS ONCE
Refills: 0 | Status: DISCONTINUED | OUTPATIENT
Start: 2022-12-01 | End: 2022-12-02

## 2022-11-17 RX ORDER — DEXTROSE 50 % IN WATER 50 %
25 SYRINGE (ML) INTRAVENOUS ONCE
Refills: 0 | Status: DISCONTINUED | OUTPATIENT
Start: 2022-12-01 | End: 2022-12-02

## 2022-11-17 RX ORDER — SPIRONOLACTONE 25 MG/1
0 TABLET, FILM COATED ORAL
Qty: 0 | Refills: 0 | DISCHARGE

## 2022-11-17 RX ORDER — GLUCAGON INJECTION, SOLUTION 0.5 MG/.1ML
1 INJECTION, SOLUTION SUBCUTANEOUS ONCE
Refills: 0 | Status: DISCONTINUED | OUTPATIENT
Start: 2022-12-01 | End: 2022-12-02

## 2022-11-17 NOTE — H&P PST ADULT - NSICDXPASTMEDICALHX_GEN_ALL_CORE_FT
PAST MEDICAL HISTORY:  2019 novel coronavirus disease (COVID-19) 3/2020- no hospitalization    Cardiomyopathy ACC/AHA Stage C, chronic systolic heart failure with reduced EF, s/p AICD    Diverticulitis past history    Diverticulosis     Dyslipidemia     GERD (gastroesophageal reflux disease)     History of hemochromatosis ? pt followed by hematologist no treatmnet -lab values is normal as per patient-will f/u labs    Hypertension     Hypertension     Lumbar radiculopathy     Type 2 diabetes mellitus

## 2022-11-17 NOTE — H&P PST ADULT - HISTORY OF PRESENT ILLNESS
68 y/o male with PMH of cardiomypopathy with reduced EF  with s/p ICD scheduled for Revision decompression laminectomy for pAT in NAD. Had laminectomy x 2 in past with sever shooting pain radiating to buttocks and to BLE . Last couple of weeks progressively got worst and was advised surgery.97

## 2022-11-17 NOTE — H&P PST ADULT - ASSESSMENT
68 y/o male with lower back pain  Planned surgtery.  Will obtain medical clearance  Pre op instructions provided  Instructions provided on medications to continue and to take the day morning of surgery   68 y/o male with lower back pain  Planned surgery. Revision decompression laminectomy 11/29/22  Will obtain medical clearance/cardiac clearance  Last echo from 5/2022  Discussed with Office and anesthesia  Pre op instructions provided  patient scheduled for covid testing 2 days prior to sugery-need to wait as there will be a change in OR scheduled  Instructions provided on medications to continue and to take the day morning of surgery   68 y/o male with lower back pain  Planned surgery. Revision decompression laminectomy 11/29/22  Will obtain medical clearance/cardiac clearance  Last echo from 5/2022-in [facility]  Discussed with Office and anesthesia-will follou up  Pre op instructions provided  patient scheduled for covid testing 2 days prior to sugery-need to wait as there will be a change in OR scheduled  Instructions provided on medications to continue and to take the day morning of surgery

## 2022-11-17 NOTE — H&P PST ADULT - NSANTHOSAYNRD_GEN_A_CORE
No. YAYO screening performed.  STOP BANG Legend: 0-2 = LOW Risk; 3-4 = INTERMEDIATE Risk; 5-8 = HIGH Risk

## 2022-11-17 NOTE — H&P PST ADULT - MUSCULOSKELETAL
back exam details… knees/no calf tenderness/decreased ROM due to pain/strength 5/5 bilateral upper extremities/no chest wall tenderness/back exam/abnormal gait

## 2022-11-18 ENCOUNTER — NON-APPOINTMENT (OUTPATIENT)
Age: 67
End: 2022-11-18

## 2022-11-18 PROBLEM — U07.1 COVID-19: Chronic | Status: ACTIVE | Noted: 2022-11-17

## 2022-11-18 PROBLEM — Z86.39 PERSONAL HISTORY OF OTHER ENDOCRINE, NUTRITIONAL AND METABOLIC DISEASE: Chronic | Status: ACTIVE | Noted: 2019-07-22

## 2022-11-18 PROBLEM — E11.9 TYPE 2 DIABETES MELLITUS WITHOUT COMPLICATIONS: Chronic | Status: ACTIVE | Noted: 2022-11-17

## 2022-11-20 NOTE — HISTORY OF PRESENT ILLNESS
[FreeTextEntry1] : Mr. LO  is a 67 year old male who returns today for endocrine reevaluation. He  returns with regard to  a history of type 2 dm. He continues on Metformin Er 750 mg daily.   Additional history includes that of htn  and hyperlipidemia along with cardiomyopathy, AICD and Pos for covid last March without residual sequelae. \par \par Has been having ongoing lower back pain with worsening discomfort down right leg.  Is to have lumbar spine surgery in the near future.\par Denies any hx of microvascular complications.\par History of Vitamin d d deficiency.\par Notes some burning feet.\par \par hx of laminectomy x 2\par Not active\par Optho neg\par takes vit d and c and three in one calc mg and zinc\par \par Rx Livalo, Olmesartan, gabapentin and prn Tadalafil\par \par

## 2022-11-21 ENCOUNTER — APPOINTMENT (OUTPATIENT)
Dept: CARDIOLOGY | Facility: CLINIC | Age: 67
End: 2022-11-21

## 2022-11-21 ENCOUNTER — LABORATORY RESULT (OUTPATIENT)
Age: 67
End: 2022-11-21

## 2022-11-21 ENCOUNTER — NON-APPOINTMENT (OUTPATIENT)
Age: 67
End: 2022-11-21

## 2022-11-21 VITALS
DIASTOLIC BLOOD PRESSURE: 65 MMHG | HEART RATE: 80 BPM | WEIGHT: 172 LBS | TEMPERATURE: 98.4 F | HEIGHT: 66 IN | SYSTOLIC BLOOD PRESSURE: 115 MMHG | OXYGEN SATURATION: 98 % | BODY MASS INDEX: 27.64 KG/M2

## 2022-11-21 DIAGNOSIS — Z95.810 PRESENCE OF AUTOMATIC (IMPLANTABLE) CARDIAC DEFIBRILLATOR: ICD-10-CM

## 2022-11-21 PROCEDURE — 93306 TTE W/DOPPLER COMPLETE: CPT

## 2022-11-21 PROCEDURE — 99214 OFFICE O/P EST MOD 30 MIN: CPT | Mod: 25

## 2022-11-21 NOTE — PHYSICAL EXAM
[General Appearance - Well Developed] : well developed [Normal Appearance] : normal appearance [Well Groomed] : well groomed [General Appearance - Well Nourished] : well nourished [No Deformities] : no deformities [General Appearance - In No Acute Distress] : no acute distress [Normal Conjunctiva] : the conjunctiva exhibited no abnormalities [Eyelids - No Xanthelasma] : the eyelids demonstrated no xanthelasmas [Normal Oral Mucosa] : normal oral mucosa [No Oral Pallor] : no oral pallor [No Oral Cyanosis] : no oral cyanosis [Normal Jugular Venous A Waves Present] : normal jugular venous A waves present [Normal Jugular Venous V Waves Present] : normal jugular venous V waves present [No Jugular Venous Nguyễn A Waves] : no jugular venous nguyễn A waves [Respiration, Rhythm And Depth] : normal respiratory rhythm and effort [Exaggerated Use Of Accessory Muscles For Inspiration] : no accessory muscle use [Auscultation Breath Sounds / Voice Sounds] : lungs were clear to auscultation bilaterally [Heart Rate And Rhythm] : heart rate and rhythm were normal [Heart Sounds] : normal S1 and S2 [Murmurs] : no murmurs present [Abdomen Soft] : soft [Abdomen Tenderness] : non-tender [Abdomen Mass (___ Cm)] : no abdominal mass palpated [Abnormal Walk] : normal gait [Gait - Sufficient For Exercise Testing] : the gait was sufficient for exercise testing [Nail Clubbing] : no clubbing of the fingernails [Cyanosis, Localized] : no localized cyanosis [Petechial Hemorrhages (___cm)] : no petechial hemorrhages [Skin Color & Pigmentation] : normal skin color and pigmentation [] : no rash [No Venous Stasis] : no venous stasis [Skin Lesions] : no skin lesions [No Skin Ulcers] : no skin ulcer [No Xanthoma] : no  xanthoma was observed [Oriented To Time, Place, And Person] : oriented to person, place, and time [Affect] : the affect was normal [Mood] : the mood was normal [No Anxiety] : not feeling anxious [FreeTextEntry1] : back pain right

## 2022-11-21 NOTE — HISTORY OF PRESENT ILLNESS
[Preoperative Visit] : for a medical evaluation prior to surgery [Scheduled Procedure ___] : a [unfilled] [Surgeon Name ___] : surgeon: [unfilled] [Good] : Good [Diabetes] : diabetes [Cardiovascular Disease] : cardiovascular disease [Anti-Platelet Agents] : anti-platelet agents [Prior Anesthesia] : Prior anesthesia [Date of Surgery ___] : on [unfilled] [Fever] : no fever [Chills] : no chills [Fatigue] : no fatigue [Chest Pain] : no chest pain [Cough] : no cough [Dyspnea] : no dyspnea [Dysuria] : no dysuria [Urinary Frequency] : no urinary frequency [Nausea] : no nausea [Vomiting] : no vomiting [Diarrhea] : no diarrhea [Abdominal Pain] : no abdominal pain [Easy Bruising] : no easy bruising [Lower Extremity Swelling] : no lower extremity swelling [Poor Exercise Tolerance] : no poor exercise tolerance [Pulmonary Disease] : no pulmonary disease [Nicotine Dependence] : no nicotine dependence [Prev Anesthesia Reaction] : no previous anesthesia reaction [FreeTextEntry1] : This is a 67 year male with a Pmhx of DM HTN HLD CM +AICD, systolic heart failure  who presents to the office for clearance for surgery. pt is scheduled for revision laminectomy with  Dr. Keven Curry  on dec 1 pt reports feeling well besdies back pain with N/T downleft leg \par \par Pt denies any CP, SOB, heart palpitations, dizziness, abdominal pain N/V/D fever or chills\par

## 2022-11-22 ENCOUNTER — NON-APPOINTMENT (OUTPATIENT)
Age: 67
End: 2022-11-22

## 2022-11-23 NOTE — PROVIDER CONTACT NOTE (OTHER) - ASSESSMENT
The Spine Pre-Operative Education packet was given to the patient on 11/16/22. The patient and NP reviewed the information included in the packet. All his questions were answered, and he gave a clear understanding of the instructions. He was advised to call the office at any time with further questions or concerns.

## 2022-11-28 ENCOUNTER — RX RENEWAL (OUTPATIENT)
Age: 67
End: 2022-11-28

## 2022-11-29 ENCOUNTER — OUTPATIENT (OUTPATIENT)
Dept: OUTPATIENT SERVICES | Facility: HOSPITAL | Age: 67
LOS: 1 days | End: 2022-11-29
Payer: COMMERCIAL

## 2022-11-29 DIAGNOSIS — Z20.828 CONTACT WITH AND (SUSPECTED) EXPOSURE TO OTHER VIRAL COMMUNICABLE DISEASES: ICD-10-CM

## 2022-11-29 DIAGNOSIS — Z95.810 PRESENCE OF AUTOMATIC (IMPLANTABLE) CARDIAC DEFIBRILLATOR: Chronic | ICD-10-CM

## 2022-11-29 DIAGNOSIS — S55.001A UNSPECIFIED INJURY OF ULNAR ARTERY AT FOREARM LEVEL, RIGHT ARM, INITIAL ENCOUNTER: Chronic | ICD-10-CM

## 2022-11-29 DIAGNOSIS — Z98.89 OTHER SPECIFIED POSTPROCEDURAL STATES: Chronic | ICD-10-CM

## 2022-11-29 LAB — SARS-COV-2 RNA SPEC QL NAA+PROBE: SIGNIFICANT CHANGE UP

## 2022-11-29 PROCEDURE — U0003: CPT

## 2022-11-29 PROCEDURE — U0005: CPT

## 2022-11-30 ENCOUNTER — TRANSCRIPTION ENCOUNTER (OUTPATIENT)
Age: 67
End: 2022-11-30

## 2022-12-01 ENCOUNTER — INPATIENT (INPATIENT)
Facility: HOSPITAL | Age: 67
LOS: 0 days | Discharge: HOME | DRG: 454 | End: 2022-12-02
Attending: ORTHOPAEDIC SURGERY | Admitting: ORTHOPAEDIC SURGERY
Payer: COMMERCIAL

## 2022-12-01 ENCOUNTER — APPOINTMENT (OUTPATIENT)
Dept: ORTHOPEDIC SURGERY | Facility: HOSPITAL | Age: 67
End: 2022-12-01

## 2022-12-01 ENCOUNTER — TRANSCRIPTION ENCOUNTER (OUTPATIENT)
Age: 67
End: 2022-12-01

## 2022-12-01 ENCOUNTER — RESULT REVIEW (OUTPATIENT)
Age: 67
End: 2022-12-01

## 2022-12-01 VITALS
TEMPERATURE: 98 F | WEIGHT: 171.52 LBS | DIASTOLIC BLOOD PRESSURE: 78 MMHG | SYSTOLIC BLOOD PRESSURE: 123 MMHG | RESPIRATION RATE: 15 BRPM | HEIGHT: 66 IN | OXYGEN SATURATION: 100 % | HEART RATE: 65 BPM

## 2022-12-01 DIAGNOSIS — Z95.810 PRESENCE OF AUTOMATIC (IMPLANTABLE) CARDIAC DEFIBRILLATOR: Chronic | ICD-10-CM

## 2022-12-01 DIAGNOSIS — Z98.89 OTHER SPECIFIED POSTPROCEDURAL STATES: Chronic | ICD-10-CM

## 2022-12-01 DIAGNOSIS — M51.36 OTHER INTERVERTEBRAL DISC DEGENERATION, LUMBAR REGION: ICD-10-CM

## 2022-12-01 DIAGNOSIS — S55.001A UNSPECIFIED INJURY OF ULNAR ARTERY AT FOREARM LEVEL, RIGHT ARM, INITIAL ENCOUNTER: Chronic | ICD-10-CM

## 2022-12-01 DIAGNOSIS — M54.16 RADICULOPATHY, LUMBAR REGION: ICD-10-CM

## 2022-12-01 DIAGNOSIS — E11.9 TYPE 2 DIABETES MELLITUS WITHOUT COMPLICATIONS: ICD-10-CM

## 2022-12-01 DIAGNOSIS — K21.9 GASTRO-ESOPHAGEAL REFLUX DISEASE WITHOUT ESOPHAGITIS: ICD-10-CM

## 2022-12-01 DIAGNOSIS — I50.22 CHRONIC SYSTOLIC (CONGESTIVE) HEART FAILURE: ICD-10-CM

## 2022-12-01 LAB
ABO RH CONFIRMATION: SIGNIFICANT CHANGE UP
ANION GAP SERPL CALC-SCNC: 8 MMOL/L — SIGNIFICANT CHANGE UP (ref 5–17)
BASE EXCESS BLDA CALC-SCNC: -3.8 MMOL/L — LOW (ref -2–3)
BASE EXCESS BLDA CALC-SCNC: -6.5 MMOL/L — LOW (ref -2–3)
BUN SERPL-MCNC: 14 MG/DL — SIGNIFICANT CHANGE UP (ref 7–23)
CALCIUM SERPL-MCNC: 8.5 MG/DL — SIGNIFICANT CHANGE UP (ref 8.4–10.5)
CHLORIDE SERPL-SCNC: 103 MMOL/L — SIGNIFICANT CHANGE UP (ref 96–108)
CO2 SERPL-SCNC: 26 MMOL/L — SIGNIFICANT CHANGE UP (ref 22–31)
CREAT SERPL-MCNC: 1.05 MG/DL — SIGNIFICANT CHANGE UP (ref 0.5–1.3)
EGFR: 78 ML/MIN/1.73M2 — SIGNIFICANT CHANGE UP
GLUCOSE BLDC GLUCOMTR-MCNC: 125 MG/DL — HIGH (ref 70–99)
GLUCOSE BLDC GLUCOMTR-MCNC: 189 MG/DL — HIGH (ref 70–99)
GLUCOSE SERPL-MCNC: 176 MG/DL — HIGH (ref 70–99)
HCO3 BLDA-SCNC: 21 MMOL/L — SIGNIFICANT CHANGE UP (ref 21–28)
HCO3 BLDA-SCNC: 24 MMOL/L — SIGNIFICANT CHANGE UP (ref 21–28)
HCT VFR BLD CALC: 43.3 % — SIGNIFICANT CHANGE UP (ref 39–50)
HGB BLD-MCNC: 14.8 G/DL — SIGNIFICANT CHANGE UP (ref 13–17)
HOROWITZ INDEX BLDA+IHG-RTO: 21 — SIGNIFICANT CHANGE UP
HOROWITZ INDEX BLDA+IHG-RTO: 21 — SIGNIFICANT CHANGE UP
MCHC RBC-ENTMCNC: 29.7 PG — SIGNIFICANT CHANGE UP (ref 27–34)
MCHC RBC-ENTMCNC: 34.2 GM/DL — SIGNIFICANT CHANGE UP (ref 32–36)
MCV RBC AUTO: 86.9 FL — SIGNIFICANT CHANGE UP (ref 80–100)
NRBC # BLD: 0 /100 WBCS — SIGNIFICANT CHANGE UP (ref 0–0)
PCO2 BLDA: 46 MMHG — SIGNIFICANT CHANGE UP (ref 35–48)
PCO2 BLDA: 62 MMHG — HIGH (ref 35–48)
PH BLDA: 7.2 — CRITICAL LOW (ref 7.35–7.45)
PH BLDA: 7.26 — LOW (ref 7.35–7.45)
PLATELET # BLD AUTO: 167 K/UL — SIGNIFICANT CHANGE UP (ref 150–400)
PO2 BLDA: 126 MMHG — HIGH (ref 83–108)
PO2 BLDA: 170 MMHG — HIGH (ref 83–108)
POTASSIUM SERPL-MCNC: 4.9 MMOL/L — SIGNIFICANT CHANGE UP (ref 3.5–5.3)
POTASSIUM SERPL-SCNC: 4.9 MMOL/L — SIGNIFICANT CHANGE UP (ref 3.5–5.3)
RBC # BLD: 4.98 M/UL — SIGNIFICANT CHANGE UP (ref 4.2–5.8)
RBC # FLD: 14.6 % — HIGH (ref 10.3–14.5)
SAO2 % BLDA: 98.9 % — HIGH (ref 94–98)
SAO2 % BLDA: 99.5 % — HIGH (ref 94–98)
SODIUM SERPL-SCNC: 137 MMOL/L — SIGNIFICANT CHANGE UP (ref 135–145)
WBC # BLD: 5.1 K/UL — SIGNIFICANT CHANGE UP (ref 3.8–10.5)
WBC # FLD AUTO: 5.1 K/UL — SIGNIFICANT CHANGE UP (ref 3.8–10.5)

## 2022-12-01 PROCEDURE — 22633 ARTHRD CMBN 1NTRSPC LUMBAR: CPT

## 2022-12-01 PROCEDURE — 20937 SP BONE AGRFT MORSEL ADD-ON: CPT | Mod: 82

## 2022-12-01 PROCEDURE — 99223 1ST HOSP IP/OBS HIGH 75: CPT

## 2022-12-01 PROCEDURE — 63047 LAM FACETEC & FORAMOT LUMBAR: CPT | Mod: 82,59

## 2022-12-01 PROCEDURE — 88304 TISSUE EXAM BY PATHOLOGIST: CPT | Mod: 26

## 2022-12-01 PROCEDURE — 22840 INSERT SPINE FIXATION DEVICE: CPT | Mod: 82

## 2022-12-01 PROCEDURE — 22840 INSERT SPINE FIXATION DEVICE: CPT

## 2022-12-01 PROCEDURE — 22853 INSJ BIOMECHANICAL DEVICE: CPT

## 2022-12-01 PROCEDURE — 22853 INSJ BIOMECHANICAL DEVICE: CPT | Mod: 82

## 2022-12-01 PROCEDURE — 63047 LAM FACETEC & FORAMOT LUMBAR: CPT | Mod: 59

## 2022-12-01 PROCEDURE — 88311 DECALCIFY TISSUE: CPT | Mod: 26

## 2022-12-01 PROCEDURE — 22633 ARTHRD CMBN 1NTRSPC LUMBAR: CPT | Mod: 82

## 2022-12-01 PROCEDURE — 20937 SP BONE AGRFT MORSEL ADD-ON: CPT

## 2022-12-01 DEVICE — IMPLANTABLE DEVICE: Type: IMPLANTABLE DEVICE | Status: FUNCTIONAL

## 2022-12-01 DEVICE — BONE WAX 2.5GM: Type: IMPLANTABLE DEVICE | Status: FUNCTIONAL

## 2022-12-01 DEVICE — CHIP CANC ASEP 1.7-10MM 30CC: Type: IMPLANTABLE DEVICE | Status: FUNCTIONAL

## 2022-12-01 DEVICE — SCREW SOLID 6.5X40MM NON STRL: Type: IMPLANTABLE DEVICE | Status: FUNCTIONAL

## 2022-12-01 DEVICE — SCREW HEAD POLY NON STRL: Type: IMPLANTABLE DEVICE | Status: FUNCTIONAL

## 2022-12-01 DEVICE — KIT SURGIFLO HEMOSTATIC MATRIX: Type: IMPLANTABLE DEVICE | Status: FUNCTIONAL

## 2022-12-01 DEVICE — ALLOGRAFT INTEGRO DBM PLUS 10CC STRL: Type: IMPLANTABLE DEVICE | Status: FUNCTIONAL

## 2022-12-01 DEVICE — SET SCREW MARINER SPINE: Type: IMPLANTABLE DEVICE | Status: FUNCTIONAL

## 2022-12-01 DEVICE — SURGIFOAM PAD SZ 100: Type: IMPLANTABLE DEVICE | Status: FUNCTIONAL

## 2022-12-01 RX ORDER — SODIUM CHLORIDE 9 MG/ML
1000 INJECTION, SOLUTION INTRAVENOUS
Refills: 0 | Status: DISCONTINUED | OUTPATIENT
Start: 2022-12-01 | End: 2022-12-02

## 2022-12-01 RX ORDER — CYCLOBENZAPRINE HYDROCHLORIDE 10 MG/1
10 TABLET, FILM COATED ORAL EVERY 8 HOURS
Refills: 0 | Status: DISCONTINUED | OUTPATIENT
Start: 2022-12-01 | End: 2022-12-02

## 2022-12-01 RX ORDER — POLYETHYLENE GLYCOL 3350 17 G/17G
17 POWDER, FOR SOLUTION ORAL AT BEDTIME
Refills: 0 | Status: DISCONTINUED | OUTPATIENT
Start: 2022-12-01 | End: 2022-12-02

## 2022-12-01 RX ORDER — HYDROMORPHONE HYDROCHLORIDE 2 MG/ML
4 INJECTION INTRAMUSCULAR; INTRAVENOUS; SUBCUTANEOUS
Refills: 0 | Status: DISCONTINUED | OUTPATIENT
Start: 2022-12-01 | End: 2022-12-02

## 2022-12-01 RX ORDER — HYDROMORPHONE HYDROCHLORIDE 2 MG/ML
0.5 INJECTION INTRAMUSCULAR; INTRAVENOUS; SUBCUTANEOUS
Refills: 0 | Status: DISCONTINUED | OUTPATIENT
Start: 2022-12-01 | End: 2022-12-02

## 2022-12-01 RX ORDER — APREPITANT 80 MG/1
40 CAPSULE ORAL ONCE
Refills: 0 | Status: COMPLETED | OUTPATIENT
Start: 2022-12-01 | End: 2022-12-01

## 2022-12-01 RX ORDER — DEXTROSE 50 % IN WATER 50 %
25 SYRINGE (ML) INTRAVENOUS ONCE
Refills: 0 | Status: DISCONTINUED | OUTPATIENT
Start: 2022-12-01 | End: 2022-12-02

## 2022-12-01 RX ORDER — GLUCAGON INJECTION, SOLUTION 0.5 MG/.1ML
1 INJECTION, SOLUTION SUBCUTANEOUS ONCE
Refills: 0 | Status: DISCONTINUED | OUTPATIENT
Start: 2022-12-01 | End: 2022-12-02

## 2022-12-01 RX ORDER — OXYCODONE HYDROCHLORIDE 5 MG/1
5 TABLET ORAL ONCE
Refills: 0 | Status: DISCONTINUED | OUTPATIENT
Start: 2022-12-01 | End: 2022-12-02

## 2022-12-01 RX ORDER — HYDROMORPHONE HYDROCHLORIDE 2 MG/ML
0.25 INJECTION INTRAMUSCULAR; INTRAVENOUS; SUBCUTANEOUS
Refills: 0 | Status: DISCONTINUED | OUTPATIENT
Start: 2022-12-01 | End: 2022-12-02

## 2022-12-01 RX ORDER — DEXTROSE 50 % IN WATER 50 %
15 SYRINGE (ML) INTRAVENOUS ONCE
Refills: 0 | Status: DISCONTINUED | OUTPATIENT
Start: 2022-12-01 | End: 2022-12-02

## 2022-12-01 RX ORDER — INSULIN LISPRO 100/ML
VIAL (ML) SUBCUTANEOUS
Refills: 0 | Status: DISCONTINUED | OUTPATIENT
Start: 2022-12-01 | End: 2022-12-02

## 2022-12-01 RX ORDER — DEXTROSE 50 % IN WATER 50 %
12.5 SYRINGE (ML) INTRAVENOUS ONCE
Refills: 0 | Status: DISCONTINUED | OUTPATIENT
Start: 2022-12-01 | End: 2022-12-02

## 2022-12-01 RX ORDER — ACETAMINOPHEN 500 MG
1000 TABLET ORAL ONCE
Refills: 0 | Status: COMPLETED | OUTPATIENT
Start: 2022-12-01 | End: 2022-12-01

## 2022-12-01 RX ORDER — ACETAMINOPHEN 500 MG
1000 TABLET ORAL EVERY 8 HOURS
Refills: 0 | Status: DISCONTINUED | OUTPATIENT
Start: 2022-12-02 | End: 2022-12-02

## 2022-12-01 RX ORDER — SODIUM CHLORIDE 9 MG/ML
1000 INJECTION, SOLUTION INTRAVENOUS
Refills: 0 | Status: DISCONTINUED | OUTPATIENT
Start: 2022-12-01 | End: 2022-12-01

## 2022-12-01 RX ORDER — CEFAZOLIN SODIUM 1 G
2000 VIAL (EA) INJECTION ONCE
Refills: 0 | Status: COMPLETED | OUTPATIENT
Start: 2022-12-01 | End: 2022-12-01

## 2022-12-01 RX ORDER — METFORMIN HYDROCHLORIDE 850 MG/1
500 TABLET ORAL DAILY
Refills: 0 | Status: DISCONTINUED | OUTPATIENT
Start: 2022-12-01 | End: 2022-12-02

## 2022-12-01 RX ORDER — HYDROMORPHONE HYDROCHLORIDE 2 MG/ML
2 INJECTION INTRAMUSCULAR; INTRAVENOUS; SUBCUTANEOUS
Refills: 0 | Status: DISCONTINUED | OUTPATIENT
Start: 2022-12-01 | End: 2022-12-02

## 2022-12-01 RX ORDER — SENNA PLUS 8.6 MG/1
2 TABLET ORAL AT BEDTIME
Refills: 0 | Status: DISCONTINUED | OUTPATIENT
Start: 2022-12-01 | End: 2022-12-02

## 2022-12-01 RX ORDER — CEFAZOLIN SODIUM 1 G
2000 VIAL (EA) INJECTION EVERY 8 HOURS
Refills: 0 | Status: COMPLETED | OUTPATIENT
Start: 2022-12-01 | End: 2022-12-01

## 2022-12-01 RX ORDER — CARVEDILOL PHOSPHATE 80 MG/1
25 CAPSULE, EXTENDED RELEASE ORAL EVERY 12 HOURS
Refills: 0 | Status: DISCONTINUED | OUTPATIENT
Start: 2022-12-01 | End: 2022-12-02

## 2022-12-01 RX ORDER — SACUBITRIL AND VALSARTAN 24; 26 MG/1; MG/1
1 TABLET, FILM COATED ORAL
Refills: 0 | Status: DISCONTINUED | OUTPATIENT
Start: 2022-12-03 | End: 2022-12-02

## 2022-12-01 RX ORDER — TRANEXAMIC ACID 100 MG/ML
2000 INJECTION, SOLUTION INTRAVENOUS ONCE
Refills: 0 | Status: COMPLETED | OUTPATIENT
Start: 2022-12-01 | End: 2022-12-01

## 2022-12-01 RX ORDER — PANTOPRAZOLE SODIUM 20 MG/1
40 TABLET, DELAYED RELEASE ORAL
Refills: 0 | Status: DISCONTINUED | OUTPATIENT
Start: 2022-12-01 | End: 2022-12-02

## 2022-12-01 RX ORDER — ONDANSETRON 8 MG/1
4 TABLET, FILM COATED ORAL EVERY 6 HOURS
Refills: 0 | Status: DISCONTINUED | OUTPATIENT
Start: 2022-12-01 | End: 2022-12-02

## 2022-12-01 RX ORDER — PITAVASTATIN CALCIUM 1.04 MG/1
4 TABLET, FILM COATED ORAL
Qty: 0 | Refills: 0 | DISCHARGE

## 2022-12-01 RX ORDER — ASPIRIN/CALCIUM CARB/MAGNESIUM 324 MG
81 TABLET ORAL DAILY
Refills: 0 | Status: DISCONTINUED | OUTPATIENT
Start: 2022-12-03 | End: 2022-12-02

## 2022-12-01 RX ADMIN — APREPITANT 40 MILLIGRAM(S): 80 CAPSULE ORAL at 07:27

## 2022-12-01 RX ADMIN — SODIUM CHLORIDE 75 MILLILITER(S): 9 INJECTION, SOLUTION INTRAVENOUS at 14:30

## 2022-12-01 RX ADMIN — SENNA PLUS 2 TABLET(S): 8.6 TABLET ORAL at 21:45

## 2022-12-01 RX ADMIN — Medication 100 MILLIGRAM(S): at 23:32

## 2022-12-01 RX ADMIN — Medication 400 MILLIGRAM(S): at 18:35

## 2022-12-01 RX ADMIN — Medication 1000 MILLIGRAM(S): at 21:45

## 2022-12-01 RX ADMIN — Medication 1000 MILLIGRAM(S): at 20:11

## 2022-12-01 RX ADMIN — SODIUM CHLORIDE 50 MILLILITER(S): 9 INJECTION, SOLUTION INTRAVENOUS at 18:44

## 2022-12-01 RX ADMIN — Medication 100 MILLIGRAM(S): at 18:35

## 2022-12-01 RX ADMIN — Medication 1000 MILLIGRAM(S): at 21:30

## 2022-12-01 NOTE — CONSULT NOTE ADULT - PROBLEM SELECTOR RECOMMENDATION 2
no signs of acute heart failure at this time  reduce post op fluids  BP stable  continue beta blocker with hold parameters  restart ARNI POD#2 with hold parameter  Hold spironolactone and farxiga now - probably restart on discharge  Monitor remote tele on 2W

## 2022-12-01 NOTE — CONSULT NOTE ADULT - SUBJECTIVE AND OBJECTIVE BOX
Patient is a 67y old  Male who presents with a chief complaint of back pain    HPI: 67M presented with back pain.  He has Had laminectomy x 2 in past with sever shooting pain radiating to buttocks and to BLE . Last couple of weeks progressively got worst and was advised surgery.  He is now s/p lumbar laminectomy and fusion.  no complaints at this time.         REVIEW OF SYSTEMS:  CONSTITUTIONAL: No fever, weight loss, or fatigue  EYES: No eye pain, visual disturbances, or discharge  ENMT:  No difficulty hearing, tinnitus, vertigo; No sinus or throat pain  NECK: No pain or stiffness  BREASTS: No pain, masses, or nipple discharge  RESPIRATORY: No cough, wheezing, chills or hemoptysis; No shortness of breath  CARDIOVASCULAR: No chest pain, palpitations, dizziness, or leg swelling  GASTROINTESTINAL: No abdominal or epigastric pain. No nausea, vomiting, or hematemesis; No diarrhea or constipation. No melena or hematochezia.  GENITOURINARY: No dysuria, frequency, hematuria, or incontinence  NEUROLOGICAL: No headaches, memory loss, loss of strength, numbness, or tremors  SKIN: No itching, burning, rashes, or lesions   LYMPH NODES: No enlarged glands  ENDOCRINE: No heat or cold intolerance; No hair loss  MUSCULOSKELETAL: No muscle or back pain  PSYCHIATRIC: No depression, anxiety, mood swings, or difficulty sleeping  HEME/LYMPH: No easy bruising, or bleeding gums  ALLERGY AND IMMUNOLOGIC: No hives or eczema    PAST MEDICAL & SURGICAL HISTORY:  Dyslipidemia      Cardiomyopathy  ACC/AHA Stage C, chronic systolic heart failure with reduced EF, s/p AICD      Hypertension      Diverticulosis      GERD (gastroesophageal reflux disease)      Diverticulitis  past history      Lumbar radiculopathy      Hypertension      History of hemochromatosis  ? pt followed by hematologist no treatmnet -lab values is normal as per patient-will f/u labs      Type 2 diabetes mellitus      2019 novel coronavirus disease (COVID-19)  3/2020- no hospitalization      AICD (automatic cardioverter/defibrillator) present  implanted 9/2009  left chest   model # 3207- 36      History of lumbar laminectomy  x2 - 2000, 2001 - s/p accident at work      Injury of right ulnar artery  s/p surgical repair 2009      AICD (automatic cardioverter/defibrillator) present  2009, battery change 9/2015          SOCIAL HISTORY:  Residence: [ ] [facility]  [ ] SNF  [ ] Community  [ ] Substance abuse:   [ ] Tobacco:   [ ] Alcohol use:     Allergies    adhesives (Other)  adhesives (Rash)  fentanyl (Seizure)  seasonal allergies (Rhinitis)  Seasonal Allergies (Rhinorrhea)  Vasotec (Rash)  Vasotec (Swelling)    Intolerances        MEDICATIONS  (STANDING):  lactated ringers. 1000 milliLiter(s) (75 mL/Hr) IV Continuous <Continuous>    MEDICATIONS  (PRN):  HYDROmorphone  Injectable 0.25 milliGRAM(s) IV Push every 10 minutes PRN Moderate Pain (4 - 6)  HYDROmorphone  Injectable 0.5 milliGRAM(s) IV Push every 10 minutes PRN Severe Pain (7 - 10)  oxyCODONE    IR 5 milliGRAM(s) Oral once PRN Moderate Pain (4 - 6)      FAMILY HISTORY:  No pertinent family history in first degree relatives        Vital Signs Last 24 Hrs  T(C): 36.3 (01 Dec 2022 17:30), Max: 36.6 (01 Dec 2022 06:56)  T(F): 97.4 (01 Dec 2022 17:30), Max: 97.9 (01 Dec 2022 06:56)  HR: 66 (01 Dec 2022 17:30) (50 - 79)  BP: 84/52 (01 Dec 2022 16:30) (83/53 - 134/70)  BP(mean): --  RR: 14 (01 Dec 2022 17:30) (13 - 23)  SpO2: 100% (01 Dec 2022 17:30) (95% - 100%)    Parameters below as of 01 Dec 2022 17:30    O2 Flow (L/min): 3      PHYSICAL EXAM:    GENERAL: NAD, well-groomed, well-developed  HEAD:  Atraumatic, Normocephalic  EYES: EOMI, PERRLA, conjunctiva and sclera clear  ENMT: No tonsillar erythema, exudates, or enlargement; Moist mucous membranes, Good dentition, No lesions  NECK: Supple, No JVD, Normal thyroid  NERVOUS SYSTEM:  Alert & Oriented X3, Good concentration; Moving all 4 extremities; No gross sensory deficits  CHEST/LUNG: Clear to auscultation bilaterally; No rales, rhonchi, wheezing, or rubs  HEART: Regular rate and rhythm; No murmurs, rubs, or gallops  ABDOMEN: Soft, Nontender, Nondistended; Bowel sounds present  EXTREMITIES:  2+ Peripheral Pulses, No clubbing, cyanosis, or edema  LYMPH: No lymphadenopathy noted  /RECTAL: Not examined  BREAST: Not examined  SKIN: No rashes or lesions  INCISION:     LABS:                        14.8   5.10  )-----------( 167      ( 01 Dec 2022 13:37 )             43.3     12-01    137  |  103  |  14  ----------------------------<  176<H>  4.9   |  26  |  1.05    Ca    8.5      01 Dec 2022 13:37          CAPILLARY BLOOD GLUCOSE      POCT Blood Glucose.: 125 mg/dL (01 Dec 2022 06:48)      RADIOLOGY & ADDITIONAL STUDIES:    EKG:   Personally Reviewed:  [ ] YES     Imaging:   Personally Reviewed:  [ ] YES     Consultant(s) Notes Reviewed:      Care Discussed with Consultants/Other Providers:                Patient is a 67y old  Male who presents with a chief complaint of back pain    HPI: 67M presented with back pain.  He has Had laminectomy x 2 in past with sever shooting pain radiating to buttocks and to BLE . Last couple of weeks progressively got worst and was advised surgery.  He is now s/p lumbar laminectomy and fusion.  no complaints at this time.  Pain controlled.   BP was low on admission to the PACU but has improved.      REVIEW OF SYSTEMS:  CONSTITUTIONAL: No fever, weight loss, or fatigue  EYES: No eye pain, visual disturbances, or discharge  ENMT:  No difficulty hearing, tinnitus, vertigo; No sinus or throat pain  NECK: No pain or stiffness  BREASTS: No pain, masses, or nipple discharge  RESPIRATORY: No cough, wheezing, chills or hemoptysis; No shortness of breath  CARDIOVASCULAR: No chest pain, palpitations, dizziness, or leg swelling  GASTROINTESTINAL: No abdominal or epigastric pain. No nausea, vomiting, or hematemesis; No diarrhea or constipation. No melena or hematochezia.  GENITOURINARY: No dysuria, frequency, hematuria, or incontinence  NEUROLOGICAL: No headaches, memory loss, loss of strength, numbness, or tremors  SKIN: No itching, burning, rashes, or lesions   LYMPH NODES: No enlarged glands  ENDOCRINE: No heat or cold intolerance; No hair loss  MUSCULOSKELETAL: No muscle or back pain  PSYCHIATRIC: No depression, anxiety, mood swings, or difficulty sleeping  HEME/LYMPH: No easy bruising, or bleeding gums  ALLERGY AND IMMUNOLOGIC: No hives or eczema    PAST MEDICAL & SURGICAL HISTORY:  Dyslipidemia      Cardiomyopathy  ACC/AHA Stage C, chronic systolic heart failure with reduced EF, s/p AICD    Hypertension    Diverticulosis    GERD (gastroesophageal reflux disease)    Diverticulitis  past history    Lumbar radiculopathy    Hypertension    History of hemochromatosis  ? pt followed by hematologist no treatmnet -lab values is normal as per patient-will f/u labs    Type 2 diabetes mellitus    2019 novel coronavirus disease (COVID-19)  3/2020- no hospitalization    AICD (automatic cardioverter/defibrillator) present  implanted 9/2009  left chest   model # 3207- 36    History of lumbar laminectomy  x2 - 2000, 2001 - s/p accident at work    Injury of right ulnar artery  s/p surgical repair 2009    AICD (automatic cardioverter/defibrillator) present  2009, battery change 9/2015        SOCIAL HISTORY:  Residence: [ ] [facility]  [ ] SNF  [ x] Community  [ ] Substance abuse:   [ ] Tobacco:   [ ] Alcohol use:     Allergies    adhesives (Other)  adhesives (Rash)  fentanyl (Seizure)  seasonal allergies (Rhinitis)  Seasonal Allergies (Rhinorrhea)  Vasotec (Rash)  Vasotec (Swelling)    Intolerances        MEDICATIONS  (STANDING):  lactated ringers. 1000 milliLiter(s) (75 mL/Hr) IV Continuous <Continuous>    MEDICATIONS  (PRN):  HYDROmorphone  Injectable 0.25 milliGRAM(s) IV Push every 10 minutes PRN Moderate Pain (4 - 6)  HYDROmorphone  Injectable 0.5 milliGRAM(s) IV Push every 10 minutes PRN Severe Pain (7 - 10)  oxyCODONE    IR 5 milliGRAM(s) Oral once PRN Moderate Pain (4 - 6)      FAMILY HISTORY:  No pertinent family history in first degree relatives        Vital Signs Last 24 Hrs  T(C): 36.3 (01 Dec 2022 17:30), Max: 36.6 (01 Dec 2022 06:56)  T(F): 97.4 (01 Dec 2022 17:30), Max: 97.9 (01 Dec 2022 06:56)  HR: 66 (01 Dec 2022 17:30) (50 - 79)  BP: 84/52 (01 Dec 2022 16:30) (83/53 - 134/70)  BP(mean): --  RR: 14 (01 Dec 2022 17:30) (13 - 23)  SpO2: 100% (01 Dec 2022 17:30) (95% - 100%)    Parameters below as of 01 Dec 2022 17:30    O2 Flow (L/min): 3      PHYSICAL EXAM:    GENERAL: NAD, well-groomed, well-developed  HEAD:  Atraumatic, Normocephalic  EYES: EOMI, PERRLA, conjunctiva and sclera clear  ENMT: No tonsillar erythema, exudates, or enlargement; Moist mucous membranes, Good dentition, No lesions  NECK: Supple, No JVD, Normal thyroid  NERVOUS SYSTEM:  Alert & Oriented X3, Good concentration; Moving all 4 extremities; No gross sensory deficits  CHEST/LUNG: Clear to auscultation bilaterally; No rales, rhonchi, wheezing, or rubs  HEART: Regular rate and rhythm; No murmurs, rubs, or gallops  ABDOMEN: Soft, Nontender, Nondistended; Bowel sounds present  EXTREMITIES:  2+ Peripheral Pulses, No clubbing, cyanosis, or edema  LYMPH: No lymphadenopathy noted  /RECTAL: Not examined  BREAST: Not examined  SKIN: No rashes or lesions  INCISION:     LABS:                        14.8   5.10  )-----------( 167      ( 01 Dec 2022 13:37 )             43.3     12-01    137  |  103  |  14  ----------------------------<  176<H>  4.9   |  26  |  1.05    Ca    8.5      01 Dec 2022 13:37          CAPILLARY BLOOD GLUCOSE      POCT Blood Glucose.: 125 mg/dL (01 Dec 2022 06:48)      RADIOLOGY & ADDITIONAL STUDIES:    EKG:   Personally Reviewed:  [ ] YES     Imaging:   Personally Reviewed:  [ ] YES     Consultant(s) Notes Reviewed:      Care Discussed with Consultants/Other Providers:                Patient is a 67y old  Male who presents with a chief complaint of back pain    HPI: 67M presented with back pain.  He has Had laminectomy x 2 in past with sever shooting pain radiating to buttocks and to BLE . Last couple of weeks progressively got worst and was advised surgery.  He is now s/p lumbar laminectomy and fusion.  no complaints at this time.  Pain controlled.   BP was low on admission to the PACU but has improved.      REVIEW OF SYSTEMS:  CONSTITUTIONAL: No fever, weight loss, or fatigue  EYES: No eye pain, visual disturbances, or discharge  ENMT:  No difficulty hearing, tinnitus, vertigo; No sinus or throat pain  NECK: No pain or stiffness  BREASTS: No pain, masses, or nipple discharge  RESPIRATORY: No cough, wheezing, chills or hemoptysis; No shortness of breath  CARDIOVASCULAR: No chest pain, palpitations, dizziness, or leg swelling  GASTROINTESTINAL: No abdominal or epigastric pain. No nausea, vomiting, or hematemesis; No diarrhea or constipation. No melena or hematochezia.  GENITOURINARY: No dysuria, frequency, hematuria, or incontinence  NEUROLOGICAL: No headaches, memory loss, loss of strength, numbness, or tremors  SKIN: No itching, burning, rashes, or lesions   LYMPH NODES: No enlarged glands  ENDOCRINE: No heat or cold intolerance; No hair loss  MUSCULOSKELETAL: No muscle or back pain  PSYCHIATRIC: No depression, anxiety, mood swings, or difficulty sleeping  HEME/LYMPH: No easy bruising, or bleeding gums  ALLERGY AND IMMUNOLOGIC: No hives or eczema    PAST MEDICAL & SURGICAL HISTORY:  Dyslipidemia      Cardiomyopathy  ACC/AHA Stage C, chronic systolic heart failure with reduced EF, s/p AICD    Hypertension    Diverticulosis    GERD (gastroesophageal reflux disease)    Diverticulitis  past history    Lumbar radiculopathy    Hypertension    History of hemochromatosis  ? pt followed by hematologist no treatmnet -lab values is normal as per patient-will f/u labs    Type 2 diabetes mellitus    2019 novel coronavirus disease (COVID-19)  3/2020- no hospitalization    AICD (automatic cardioverter/defibrillator) present  implanted 9/2009  left chest   model # 3207- 36    History of lumbar laminectomy  x2 - 2000, 2001 - s/p accident at work    Injury of right ulnar artery  s/p surgical repair 2009    AICD (automatic cardioverter/defibrillator) present  2009, battery change 9/2015        SOCIAL HISTORY:  Residence: [ ] [facility]  [ ] SNF  [ x] Community  [ ] Substance abuse: denies  [ ] Tobacco: denies  [ ] Alcohol use: occ beer.      Allergies  adhesives (Other)  adhesives (Rash)  fentanyl (Seizure)  seasonal allergies (Rhinitis)  Seasonal Allergies (Rhinorrhea)  Vasotec (Rash)  Vasotec (Swelling)      MEDICATIONS  (STANDING):  lactated ringers. 1000 milliLiter(s) (75 mL/Hr) IV Continuous <Continuous>    MEDICATIONS  (PRN):  HYDROmorphone  Injectable 0.25 milliGRAM(s) IV Push every 10 minutes PRN Moderate Pain (4 - 6)  HYDROmorphone  Injectable 0.5 milliGRAM(s) IV Push every 10 minutes PRN Severe Pain (7 - 10)  oxyCODONE    IR 5 milliGRAM(s) Oral once PRN Moderate Pain (4 - 6)      FAMILY HISTORY:  No pertinent family history in first degree relatives        Vital Signs Last 24 Hrs  T(C): 36.3 (01 Dec 2022 17:30), Max: 36.6 (01 Dec 2022 06:56)  T(F): 97.4 (01 Dec 2022 17:30), Max: 97.9 (01 Dec 2022 06:56)  HR: 66 (01 Dec 2022 17:30) (50 - 79)  BP: 84/52 (01 Dec 2022 16:30) (83/53 - 134/70)  BP(mean): --  RR: 14 (01 Dec 2022 17:30) (13 - 23)  SpO2: 100% (01 Dec 2022 17:30) (95% - 100%)    Parameters below as of 01 Dec 2022 17:30    O2 Flow (L/min): 3      PHYSICAL EXAM:    GENERAL: NAD, well-groomed, well-developed  HEAD:  Atraumatic, Normocephalic  EYES: conjunctiva and sclera clear  ENMT: Moist mucous membranes  NECK: Supple, No JVD  NERVOUS SYSTEM:  Alert & Oriented X3, Good concentration; Moving all 4 extremities; No gross sensory deficits  CHEST/LUNG: Clear to auscultation bilaterally; No rales, rhonchi, wheezing, or rubs  HEART: Regular rate and rhythm;   ABDOMEN: Soft, Nontender, Nondistended; Bowel sounds present  EXTREMITIES:  2+ Peripheral Pulses, No clubbing, cyanosis, or edema  LYMPH: No lymphadenopathy noted  /RECTAL: Not examined  BREAST: Not examined  SKIN: No rashes or lesions  INCISION:     LABS:                        14.8   5.10  )-----------( 167      ( 01 Dec 2022 13:37 )             43.3     12-01    137  |  103  |  14  ----------------------------<  176<H>  4.9   |  26  |  1.05    Ca    8.5      01 Dec 2022 13:37          CAPILLARY BLOOD GLUCOSE      POCT Blood Glucose.: 125 mg/dL (01 Dec 2022 06:48)      RADIOLOGY & ADDITIONAL STUDIES:    EKG: paced  Personally Reviewed:  [x ] YES     Imaging:   Personally Reviewed:  [ ] YES     Consultant(s) Notes Reviewed:  pre-op cardio clearance reviewed    Care Discussed with Consultants/Other Providers:

## 2022-12-01 NOTE — CONSULT NOTE ADULT - ASSESSMENT
67M DM2, HTN, HLD, HFrEF, last EF 40-45%, cardiomyopathy, PPM/AICD in place s/p lumbar laminectomy and fusion

## 2022-12-01 NOTE — BRIEF OPERATIVE NOTE - NSICDXBRIEFPROCEDURE_GEN_ALL_CORE_FT
PROCEDURES:  Fusion, spine, lumbar, TLIF, 1-2 levels 01-Dec-2022 13:03:18 L4-5, L5-S1 left hemilaminectomy Terry Santiago

## 2022-12-01 NOTE — PHYSICAL THERAPY INITIAL EVALUATION ADULT - DID THE PATIENT HAVE SURGERY?
REVISION DECOMPRESSION LAMINECTOMY L4-5 TRANSFORMINAL LUMBAR INTERBODY FUSION L4-5 POSTERIOR SPINAL FUSION L4-5 SPINAL INSTRUMENTATION L4-5 ILIAC CREST BONE GRAFT ALLOGRAFT/yes

## 2022-12-01 NOTE — CHART NOTE - NSCHARTNOTEFT_GEN_A_CORE
Order received from ortho PA to fit patient with LSO to be used after surgery.  Pt measured and fit with LSO  Pt stood with assist   Pt comfortable with brace in place  Written instructions provided and office contact info also provided for reference  Follow up as needed      Vlad SERNA  [phone]

## 2022-12-01 NOTE — CONSULT NOTE ADULT - PROBLEM SELECTOR RECOMMENDATION 9
Pain management per ortho  PT/OT  Pharmacological DVT proph contraindicated in spine surgery patient

## 2022-12-01 NOTE — BRIEF OPERATIVE NOTE - NSICDXBRIEFPREOP_GEN_ALL_CORE_FT
PRE-OP DIAGNOSIS:  Degenerative lumbar disc 01-Dec-2022 13:06:19 L4-5 Terry Santiago  Lumbar foraminal stenosis 01-Dec-2022 13:06:46 L5-S1 Left Terry Santiago

## 2022-12-01 NOTE — CHART NOTE - NSCHARTNOTEFT_GEN_A_CORE
asked by Dr. Arce to remove patient's left radial vinod.  line removed, pressure held until hemostasis achieved, dressing placed.

## 2022-12-01 NOTE — PHYSICAL THERAPY INITIAL EVALUATION ADULT - GAIT DEVIATIONS NOTED, PT EVAL
decreased vinay/decreased velocity of limb motion/decreased step length/decreased weight-shifting ability

## 2022-12-01 NOTE — PHYSICAL THERAPY INITIAL EVALUATION ADULT - ADDITIONAL COMMENTS
Pt lives in private home with wife. Home has no steps to enter and full flight inside. Pt was independent prior, occasionally used cane.

## 2022-12-01 NOTE — PHYSICAL THERAPY INITIAL EVALUATION ADULT - PERTINENT HX OF CURRENT PROBLEM, REHAB EVAL
81M with Parkinson's, DM2 on insulin, HTN, HLD, BPH who presents with slurred speech.  Patient reportedly started to have symptoms around 1:50pm.  No overt numbness or weakness (though said his right arm has been more weak for the past 1-2 days).  No chest pain, SOB, HA, dizziness.  Worker who was with him also mentioned that he was not coherent.  However, patient does recall episode.  Patient said that recently for the past 1-2 week he also has been feeling more tired.  No fevers, no acute illness.  No recent travel or sick contacts.  Patient brought here for further evaluation.

## 2022-12-01 NOTE — BRIEF OPERATIVE NOTE - NSICDXBRIEFPOSTOP_GEN_ALL_CORE_FT
POST-OP DIAGNOSIS:  Lumbar foraminal stenosis 01-Dec-2022 13:07:15 L5-S1 Left Terry Santiago  Degenerative lumbar disc 01-Dec-2022 13:07:08 L4-5 Terry Santiago

## 2022-12-02 ENCOUNTER — TRANSCRIPTION ENCOUNTER (OUTPATIENT)
Age: 67
End: 2022-12-02

## 2022-12-02 VITALS
TEMPERATURE: 99 F | SYSTOLIC BLOOD PRESSURE: 99 MMHG | OXYGEN SATURATION: 97 % | HEART RATE: 82 BPM | DIASTOLIC BLOOD PRESSURE: 59 MMHG | RESPIRATION RATE: 16 BRPM

## 2022-12-02 LAB
ANION GAP SERPL CALC-SCNC: 8 MMOL/L — SIGNIFICANT CHANGE UP (ref 5–17)
BASOPHILS # BLD AUTO: 0.01 K/UL — SIGNIFICANT CHANGE UP (ref 0–0.2)
BASOPHILS # BLD AUTO: 0.02 K/UL
BASOPHILS NFR BLD AUTO: 0.1 % — SIGNIFICANT CHANGE UP (ref 0–2)
BASOPHILS NFR BLD AUTO: 0.6 %
BUN SERPL-MCNC: 15 MG/DL — SIGNIFICANT CHANGE UP (ref 7–23)
CALCIUM SERPL-MCNC: 8.6 MG/DL — SIGNIFICANT CHANGE UP (ref 8.4–10.5)
CHLORIDE SERPL-SCNC: 105 MMOL/L — SIGNIFICANT CHANGE UP (ref 96–108)
CO2 SERPL-SCNC: 27 MMOL/L — SIGNIFICANT CHANGE UP (ref 22–31)
CREAT SERPL-MCNC: 1.19 MG/DL — SIGNIFICANT CHANGE UP (ref 0.5–1.3)
EGFR: 67 ML/MIN/1.73M2 — SIGNIFICANT CHANGE UP
EOSINOPHIL # BLD AUTO: 0 K/UL — SIGNIFICANT CHANGE UP (ref 0–0.5)
EOSINOPHIL # BLD AUTO: 0.07 K/UL
EOSINOPHIL NFR BLD AUTO: 0 % — SIGNIFICANT CHANGE UP (ref 0–6)
EOSINOPHIL NFR BLD AUTO: 2 %
GLUCOSE BLDC GLUCOMTR-MCNC: 111 MG/DL — HIGH (ref 70–99)
GLUCOSE BLDC GLUCOMTR-MCNC: 129 MG/DL — HIGH (ref 70–99)
GLUCOSE SERPL-MCNC: 137 MG/DL — HIGH (ref 70–99)
HCT VFR BLD CALC: 42.1 % — SIGNIFICANT CHANGE UP (ref 39–50)
HCT VFR BLD CALC: 48.6 %
HGB BLD-MCNC: 14 G/DL — SIGNIFICANT CHANGE UP (ref 13–17)
HGB BLD-MCNC: 16 G/DL
IMM GRANULOCYTES NFR BLD AUTO: 0 %
IMM GRANULOCYTES NFR BLD AUTO: 0.3 % — SIGNIFICANT CHANGE UP (ref 0–0.9)
LYMPHOCYTES # BLD AUTO: 0.68 K/UL — LOW (ref 1–3.3)
LYMPHOCYTES # BLD AUTO: 1.21 K/UL
LYMPHOCYTES # BLD AUTO: 6.6 % — LOW (ref 13–44)
LYMPHOCYTES NFR BLD AUTO: 34.8 %
MAN DIFF?: NORMAL
MCHC RBC-ENTMCNC: 28.8 PG — SIGNIFICANT CHANGE UP (ref 27–34)
MCHC RBC-ENTMCNC: 29.6 PG
MCHC RBC-ENTMCNC: 32.9 GM/DL
MCHC RBC-ENTMCNC: 33.3 GM/DL — SIGNIFICANT CHANGE UP (ref 32–36)
MCV RBC AUTO: 86.6 FL — SIGNIFICANT CHANGE UP (ref 80–100)
MCV RBC AUTO: 89.8 FL
MONOCYTES # BLD AUTO: 0.61 K/UL
MONOCYTES # BLD AUTO: 1.17 K/UL — HIGH (ref 0–0.9)
MONOCYTES NFR BLD AUTO: 11.4 % — SIGNIFICANT CHANGE UP (ref 2–14)
MONOCYTES NFR BLD AUTO: 17.5 %
NEUTROPHILS # BLD AUTO: 1.57 K/UL
NEUTROPHILS # BLD AUTO: 8.36 K/UL — HIGH (ref 1.8–7.4)
NEUTROPHILS NFR BLD AUTO: 45.1 %
NEUTROPHILS NFR BLD AUTO: 81.6 % — HIGH (ref 43–77)
NRBC # BLD: 0 /100 WBCS — SIGNIFICANT CHANGE UP (ref 0–0)
PLATELET # BLD AUTO: 160 K/UL — SIGNIFICANT CHANGE UP (ref 150–400)
PLATELET # BLD AUTO: 215 K/UL
POTASSIUM SERPL-MCNC: 4.1 MMOL/L — SIGNIFICANT CHANGE UP (ref 3.5–5.3)
POTASSIUM SERPL-SCNC: 4.1 MMOL/L — SIGNIFICANT CHANGE UP (ref 3.5–5.3)
RBC # BLD: 4.86 M/UL — SIGNIFICANT CHANGE UP (ref 4.2–5.8)
RBC # BLD: 5.41 M/UL
RBC # FLD: 14.4 % — SIGNIFICANT CHANGE UP (ref 10.3–14.5)
RBC # FLD: 15.2 %
SODIUM SERPL-SCNC: 140 MMOL/L — SIGNIFICANT CHANGE UP (ref 135–145)
WBC # BLD: 10.25 K/UL — SIGNIFICANT CHANGE UP (ref 3.8–10.5)
WBC # FLD AUTO: 10.25 K/UL — SIGNIFICANT CHANGE UP (ref 3.8–10.5)
WBC # FLD AUTO: 3.48 K/UL

## 2022-12-02 PROCEDURE — C1889: CPT

## 2022-12-02 PROCEDURE — 72100 X-RAY EXAM L-S SPINE 2/3 VWS: CPT | Mod: 26

## 2022-12-02 PROCEDURE — 97161 PT EVAL LOW COMPLEX 20 MIN: CPT

## 2022-12-02 PROCEDURE — 97165 OT EVAL LOW COMPLEX 30 MIN: CPT

## 2022-12-02 PROCEDURE — 82803 BLOOD GASES ANY COMBINATION: CPT

## 2022-12-02 PROCEDURE — 88304 TISSUE EXAM BY PATHOLOGIST: CPT

## 2022-12-02 PROCEDURE — 88311 DECALCIFY TISSUE: CPT

## 2022-12-02 PROCEDURE — 85025 COMPLETE CBC W/AUTO DIFF WBC: CPT

## 2022-12-02 PROCEDURE — 76000 FLUOROSCOPY <1 HR PHYS/QHP: CPT

## 2022-12-02 PROCEDURE — C1713: CPT

## 2022-12-02 PROCEDURE — 97530 THERAPEUTIC ACTIVITIES: CPT

## 2022-12-02 PROCEDURE — 85027 COMPLETE CBC AUTOMATED: CPT

## 2022-12-02 PROCEDURE — 80048 BASIC METABOLIC PNL TOTAL CA: CPT

## 2022-12-02 PROCEDURE — 82962 GLUCOSE BLOOD TEST: CPT

## 2022-12-02 PROCEDURE — 99233 SBSQ HOSP IP/OBS HIGH 50: CPT

## 2022-12-02 PROCEDURE — 36415 COLL VENOUS BLD VENIPUNCTURE: CPT

## 2022-12-02 PROCEDURE — 97535 SELF CARE MNGMENT TRAINING: CPT

## 2022-12-02 PROCEDURE — 97116 GAIT TRAINING THERAPY: CPT

## 2022-12-02 PROCEDURE — 72100 X-RAY EXAM L-S SPINE 2/3 VWS: CPT

## 2022-12-02 RX ORDER — HYDROMORPHONE HYDROCHLORIDE 2 MG/ML
1 INJECTION INTRAMUSCULAR; INTRAVENOUS; SUBCUTANEOUS
Qty: 42 | Refills: 0
Start: 2022-12-02 | End: 2022-12-08

## 2022-12-02 RX ORDER — ACETAMINOPHEN 500 MG
2 TABLET ORAL
Qty: 0 | Refills: 0 | DISCHARGE
Start: 2022-12-02 | End: 2022-12-15

## 2022-12-02 RX ORDER — TIZANIDINE 4 MG/1
1 TABLET ORAL
Qty: 42 | Refills: 0
Start: 2022-12-02 | End: 2022-12-15

## 2022-12-02 RX ORDER — DICLOFENAC SODIUM 30 MG/G
1 GEL TOPICAL
Qty: 0 | Refills: 0 | DISCHARGE

## 2022-12-02 RX ADMIN — Medication 1000 MILLIGRAM(S): at 14:01

## 2022-12-02 RX ADMIN — PANTOPRAZOLE SODIUM 40 MILLIGRAM(S): 20 TABLET, DELAYED RELEASE ORAL at 06:27

## 2022-12-02 RX ADMIN — Medication 1000 MILLIGRAM(S): at 06:27

## 2022-12-02 RX ADMIN — CARVEDILOL PHOSPHATE 25 MILLIGRAM(S): 80 CAPSULE, EXTENDED RELEASE ORAL at 06:28

## 2022-12-02 RX ADMIN — Medication 1000 MILLIGRAM(S): at 06:29

## 2022-12-02 RX ADMIN — METFORMIN HYDROCHLORIDE 500 MILLIGRAM(S): 850 TABLET ORAL at 12:19

## 2022-12-02 NOTE — DISCHARGE NOTE NURSING/CASE MANAGEMENT/SOCIAL WORK - PATIENT PORTAL LINK FT
You can access the FollowMyHealth Patient Portal offered by [facility] by registering at the following website: [email or web address]. By joining [facility]’s FollowMyHealth portal, you will also be able to view your health information using other applications (apps) compatible with our system.

## 2022-12-02 NOTE — DISCHARGE NOTE PROVIDER - NSDCFUSCHEDAPPT_GEN_ALL_CORE_FT
Kailey Kay  [facility] Physician [facility]  HEARTFAIL [address]  Scheduled Appointment: 12/14/2022    Keven Sneed  [facility]  ORTHOSURG [address]  Scheduled Appointment: 12/19/2022    Terry Grady  [facility]  CARDIOLOGY [address]   Scheduled Appointment: 12/30/2022

## 2022-12-02 NOTE — OCCUPATIONAL THERAPY INITIAL EVALUATION ADULT - RANGE OF MOTION EXAMINATION, UPPER EXTREMITY
b/l shoulder flexion held 0-90* 2* spinal precautions/bilateral UE Active ROM was WFL  (within functional limits)

## 2022-12-02 NOTE — PROGRESS NOTE ADULT - PROBLEM SELECTOR PLAN 1
Pain management per ortho  PT/OT  Pharmacological DVT proph contraindicated in spine surgery patient.    no medical objection to dc planning

## 2022-12-02 NOTE — DISCHARGE NOTE NURSING/CASE MANAGEMENT/SOCIAL WORK - NSDCPEFALRISK_GEN_ALL_CORE
For information on Fall & Injury Prevention, visit: [email or web address] OR  [email or web address] OR  https://www.cdc.gov/steadi/patient.html

## 2022-12-02 NOTE — DISCHARGE NOTE PROVIDER - HOSPITAL COURSE
This patient was admitted to [facility] with a history of lumbar DDD, foraminal stenosis.  Patient underwent Pre-Surgical Testing and was medically cleared to undergo elective procedure. Patient underwent L5/S1 hemilaminectomy, L4/5 TLIF, L4/5, PSF L4/5 by Dr. Keven Sneed on 12/1/22. Procedure was well tolerated.  No operative or jeana-operative complications arose during patient's hospital course.  Patient received antibiotic according to SCIP guidelines for infection prevention.  SCDs were used for DVT prophylaxis. Anesthesia, Medical Hospitalist, Physical Therapy and Occupational Therapy were consulted. Patient is stable for discharge with a good prognosis.  Appropriate discharge instructions and medications are provided in this document.

## 2022-12-02 NOTE — DISCHARGE NOTE PROVIDER - CARE PROVIDER_API CALL
Keven Snede (MD)  Orthopaedic Surgery  [address], [address]  [place] [address]  Phone: [phone]  Fax: [phone]  Established Patient  Scheduled Appointment: 12/19/2022 08:15 AM

## 2022-12-02 NOTE — OCCUPATIONAL THERAPY INITIAL EVALUATION ADULT - ASSISTIVE DEVICE:SUPINE/SIT, REHAB EVAL
"              After Visit Summary   11/20/2017    Cary Clements    MRN: [id number]           Patient Information     Date Of Birth          1980        Visit Information        Provider Department      11/20/2017 5:05 PM Stacy Powers MD [facility]        Today's Diagnoses     Numbness and tingling of left lower extremity    -  1    Acute left-sided low back pain with left-sided sciatica          Care Instructions      * Sciatica    Sciatica (\"Lumbar Radiculopathy\") causes a pain that spreads from the lower back down into the buttock, hip and leg. Sometimes leg pain can occur without any back pain. Sciatica is due to irritation or pressure on a spinal nerve as it comes out of the spinal canal. This is most often due to a bulge or rupture of a nearby spinal disk (the cartilage cushion between each spinal bone), which presses on a nearby nerve. Other causes include spinal stenosis (narrowing of the spinal canal) and spasm of the piriformis muscle (a muscle in the buttocks that the sciatic nerve passes through).  Sciatica may begin after a sudden twisting/bending force (such as in a car accident), or sometimes after a simple awkward movement. In either case, muscle spasm is commonly present and contributes to the pain.  The diagnosis of sciatica is made from the symptoms and physical exam. Unless you had a physical injury (such as a car accident or fall), X-rays are usually not ordered for the initial evaluation of sciatica because the nerves and disks cannot be seen on an X-ray. Most sciatica (80-90%) gets better with time.  What can I do about my low back pain?  There are three main things you can do to ease low back pain and help it go away.    Use heat or cold packs.    Take medicine as directed.    Use positions, movements and exercises. Stay active! Too much rest can make your symptoms worse.  Using heat or cold packs  Try cold packs or gentle heat to ease your pain. Use " whichever gives the most relief. Apply the cold pack or heat for 15 minutes at a time, as often as needed.  Taking medicine  If taking over-the-counter medicine:    Take ibuprofen (Advil, Motrin) 600 mg. three times a day as needed for pain.  OR    Take Aleve (naproxen sodium) 220 to 440 mg. two times a day as needed for pain  If your doctor prescribed a muscle relaxant (cyclobenzapine 10 mg.):    Take one half ( ) to 1 tablet at bedtime    Do not drive when taking this medicine. This drug may make you sleepy.  Using positions, movements and exercises  Research tells us that moving your joints and muscles can help you recover from back pain. Such activity should be simple and gentle.  Use the positions below as well as walking to help relieve your discomfort. Try taking a short walk every 3 to 4 hours during the day. Walk for a few minutes inside your home or take longer walks outside, on a treadmill or at a mall. Slowly increase the amount of time you walk. Expect discomfort when you begin, but it should lessen as your back starts to recover.  Finding a position that is comfortable  When your back pain is new, you may find that certain positions will ease your pain. Gently try each of the following positions until you find one that eases your pain. Once you find a position of comfort, use it as often as you like while you recover. Return to your daily routine as soon as possible.     Lie on your back with your legs bent. You can do this by placing a pillow under your knees or lie on the floor and rest your lower legs on the seat of a chair.    Lie on your side with your knees bent and place a pillow between your knees.    Lie on your stomach over pillows.  When should I call my doctor?  Your back pain should improve over the first couple of weeks. As it improves, you should be able to return to your normal activities. But call your doctor if:    You have a sudden change in your ability to control? your bladder or  "bowels.    You begin to feel tingling in your groin or legs.    The pain spreads down your leg and into your foot.    Your toes, feet or leg muscles begin to feel weak.    You feel generally unwell or sick.    Your pain gets worse.    5966-8697 [employer]. [address] [place] [address]. All rights reserved. This information is not intended as a substitute for professional medical care. Always follow your healthcare professional's instructions.  This information has been modified by your health care provider with permission from the publisher.                Follow-ups after your visit        Who to contact     If you have questions or need follow up information about today's clinic visit or your schedule please contact [facility] directly at [phone].  Normal or non-critical lab and imaging results will be communicated to you by [employer], letter or phone within 4 business days after the clinic has received the results. If you do not hear from us within 7 days, please contact the clinic through [employer] or phone. If you have a critical or abnormal lab result, we will notify you by phone as soon as possible.  Submit refill requests through [employer] or call your pharmacy and they will forward the refill request to us. Please allow 3 business days for your refill to be completed.          Additional Information About Your Visit        [employer] Information     [employer] lets you send messages to your doctor, view your test results, renew your prescriptions, schedule appointments and more. To sign up, go to [email or web address] . Click on \"Log in\" on the left side of the screen, which will take you to the Welcome page. Then click on \"Sign up Now\" on the right side of the page.     You will be asked to enter the access code listed below, as well as some personal information. Please follow the directions to create your username and password.     Your access code is: " [email or web address]  Expires: 2017  3:07 PM     Your access code will  in 90 days. If you need help or a new code, please call your [facility] clinic or [phone].        Care EveryWhere ID     This is your Care EveryWhere ID. This could be used by other organizations to access your [facility] medical records  [id number]        Your Vitals Were     Pulse Temperature Pulse Oximetry BMI (Body Mass Index)          79 98.5  F (36.9  C) (Oral) 98% 42.17 kg/m2         Blood Pressure from Last 3 Encounters:   17 128/86   09/15/17 (!) 126/91    Weight from Last 3 Encounters:   17 245 lb 11.2 oz (111.4 kg)   09/15/17 237 lb (107.5 kg)              Today, you had the following     No orders found for display         Today's Medication Changes          These changes are accurate as of: 17  6:38 PM.  If you have any questions, ask your nurse or doctor.               Start taking these medicines.        Dose/Directions    methylPREDNISolone 4 MG tablet   Commonly known as:  MEDROL DOSEPAK   Used for:  Numbness and tingling of left lower extremity, Acute left-sided low back pain with left-sided sciatica   Started by:  Stacy Powers MD        Follow package instructions   Quantity:  21 tablet   Refills:  0            Where to get your medicines      Some of these will need a paper prescription and others can be bought over the counter.  Ask your nurse if you have questions.     Bring a paper prescription for each of these medications     methylPREDNISolone 4 MG tablet                Primary Care Provider Office Phone # Fax #    Mavis Leach [phone] [address] [address]        Equal Access to Services     [facility] Center[facility] : Hadkhalif Lindsay, rose marie fournier, maria isabel salas. So [facility] [phone].    ATENCIÓN: Si habla español, tiene a ramírez disposición servicios gratuitos de asistencia lingüística.  Víctor oliveira [phone].    We comply with applicable federal civil rights laws and [place] laws. We do not discriminate on the basis of race, color, national origin, age, disability, sex, sexual orientation, or gender identity.            Thank you!     Thank you for choosing [facility]  for your care. Our goal is always to provide you with excellent care. Hearing back from our patients is one way we can continue to improve our services. Please take a few minutes to complete the written survey that you may receive in the mail after your visit with us. Thank you!             Your Updated Medication List - Protect others around you: Learn how to safely use, store and throw away your medicines at www.disposemymeds.org.          This list is accurate as of: 11/20/17  6:38 PM.  Always use your most recent med list.                   Brand Name Dispense Instructions for use Diagnosis    citalopram 20 MG tablet    celeXA     TAKE 1 TABLET BY MOUTH DAILY        HYDROcodone-acetaminophen 5-325 MG per tablet    NORCO    10 tablet    Take 1-2 tablets by mouth every 4 hours as needed for moderate to severe pain        methylPREDNISolone 4 MG tablet    MEDROL DOSEPAK    21 tablet    Follow package instructions    Numbness and tingling of left lower extremity, Acute left-sided low back pain with left-sided sciatica          bed rails

## 2022-12-02 NOTE — DISCHARGE NOTE PROVIDER - NSDCMRMEDTOKEN_GEN_ALL_CORE_FT
acetaminophen 500 mg oral tablet: 2 tab(s) orally every 8 hours  aspirin 81 mg oral delayed release tablet: 1 tab(s) orally once a day (may start Saturday 12/3)  calcium carbonate-magnesium chloride: 1 tab daily  carvedilol 25 mg oral tablet: 1 tab(s) orally every 12 hours  Dexilant 60 mg oral delayed release capsule: 1 cap(s) orally once a day, As Needed  Entresto 97 mg-103 mg oral tablet: 1 tab(s) orally 2 times a day  Farxiga 10 mg oral tablet: 1 tab(s) orally once a day  HYDROmorphone 2 mg oral tablet: 1-2  tab orally every 4 hours, As Needed for pain    Ref# [id number]  MDD:6  Lumbar Brace: Wear when out of bed for support and comfort. s/p L4-5 TLIF (lumbar interbody fusion)  and L5-S1 hemilaminectomy.    metFORMIN 750 mg oral tablet, extended release: 1 tab(s) orally once a day  SPIRONOLACT 25MG TAB: tab(s) orally once a day  Vitamin D3 2000 intl units oral tablet: 1 tab(s) orally once a day  Zanaflex 2 mg oral capsule: 1 cap(s) orally 3 times a day, As Needed -for muscle spasm

## 2022-12-02 NOTE — DISCHARGE NOTE PROVIDER - INSTRUCTIONS
Upper Endoscopy   WHAT YOU NEED TO KNOW:   An upper endoscopy is also called an upper gastrointestinal (GI) endoscopy, or an esophagogastroduodenoscopy (EGD)  It is a procedure to examine the inside of your esophagus, stomach, and duodenum (first part of the small intestine) with a scope  You may feel bloated, gassy, or have some abdominal discomfort after your procedure  Your throat may be sore for 24 to 36 hours  You may burp or pass gas from air that is still inside your body  DISCHARGE INSTRUCTIONS:   Seek care immediately if:    You have sudden, severe abdominal pain   You have problems swallowing   You have a large amount of black, sticky bowel movements or blood in your bowel movements   You have sudden trouble breathing   You feel weak, lightheaded, or faint or your heart beats faster than normal for you  Contact your healthcare provider if:    You have a fever and chills   You have nausea or are vomiting   Your abdomen is bloated or feels full and hard   You have abdominal pain   You have black, sticky bowel movements or blood in your bowel movements   You have not had a bowel movement for 3 days after your procedure   You have rash or hives   You have questions or concerns about your procedure  Activity:    Do not lift, strain, or run for 24 hours after your procedure   Rest after your procedure  You have been given medicine to relax you  Do not drive or make important decisions until the day after your procedure  Return to your normal activity as directed   Relieve gas and discomfort from bloating by lying on your right side with a heating pad on your abdomen  You may need to take short walks to help the gas move out  Eat small meals until bloating is relieved  Follow up with your healthcare provider as directed: Write down your questions so you remember to ask them during your visits       If you take a blood thinner, please review the specific instructions from your endoscopist about when you should resume it  These can be found in the Recommendation and Your Medication list sections of this After Visit Summary  Pain medicine has been prescribed for you, as needed, and it often causes constipation.  Take docusate sodium (Colace) 100mg 3x daily, while taking narcotic pain medication.   For Constipation :   • Increase your water intake. Drink at least 8 glasses of water daily.  • Try adding fiber to your diet by eating fruits, vegetables and foods that are rich in grains.  • If you do experience constipation, you may take an over-the-counter laxative such as, Senokot, Miralax or  Milk of Magnesia.

## 2022-12-02 NOTE — OCCUPATIONAL THERAPY INITIAL EVALUATION ADULT - LIVES WITH, PROFILE
Pt lives with his wife in a private home, 0 steps to enter, 12-13 steps inside with a tub. Pt occasionally used a SAC outside of his home./spouse

## 2022-12-02 NOTE — DISCHARGE NOTE PROVIDER - PROVIDER TOKENS
PROVIDER:[TOKEN:[[id number]],SCHEDULEDAPPT:[12/19/2022],SCHEDULEDAPPTTIME:[08:15 AM],ESTABLISHEDPATIENT:[T]]

## 2022-12-02 NOTE — OCCUPATIONAL THERAPY INITIAL EVALUATION ADULT - LIGHT TOUCH SENSATION, LUE, REHAB EVAL
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 Early proliferative vitreoretinopathy     Plan:   - consider Pars plana vitrectomy (PPV)  Silicone Oil removal; subretinal fluid Drainage, endolaser, and Silicone Oil placement right eye   - will obtain second opinion with [facility] in 1-2 weeks   - poor visual prognosis because of  Severe trauma involving macula   - Position: any  - Retina detachment and endophthalmitis precautions were discussed with the patient and was asked to return if any of the those occur  - Meds  Discontinue ointment  Taper Maxitrol three times a day 1 week, twice a day 1 week, daily 1 week then stop  Stop Atropine daily    patient complains of neck and head pain sstill since injury. Suggest he return to his MD and have recheck with possible scans, ie: CT/MRI    Sushil Wright M.D.  PGY-3, Ophthalmology    ~~~~~~~~~~~~~~~~~~~~~~~~~~~~~~~~~~   Complete documentation of historical and exam elements from today's encounter can be found in the full encounter summary report (not reduplicated in this progress note).  I personally obtained the chief complaint(s) and history of present illness.  I confirmed and edited as necessary the review of systems, past medical/surgical history, family history, social history, and  examination findings as documented by others; and I examined the patient myself.  I personally reviewed the relevant tests, images, and reports as documented above.  I formulated and edited as necessary the assessment and plan and discussed the findings and management plan with the patient and family    Norma Aleman MD  .  Retina Service   Department of Ophthalmology and Visual Neurosciences   [facility]  Phone: [phone]   Fax: [phone]            within normal limits

## 2022-12-02 NOTE — PROGRESS NOTE ADULT - SUBJECTIVE AND OBJECTIVE BOX
POST OPERATIVE DAY #:  1  STATUS POST: L5/S1 hemilaminectomy, L4/5 TLIF, L4/5, PSF L4/5    SUBJECTIVE: Patient seen and examined. Feeling well. Leg pain mostly resolved. Has some incisional pain which is tolerable.    Pain (0-10): 4      OBJECTIVE:     Vital Signs Last 24 Hrs  T(C): 37 (02 Dec 2022 08:37), Max: 37 (02 Dec 2022 08:37)  T(F): 98.6 (02 Dec 2022 08:37), Max: 98.6 (02 Dec 2022 08:37)  HR: 82 (02 Dec 2022 08:37) (50 - 82)  BP: 99/59 (02 Dec 2022 08:37) (83/53 - 134/70)  RR: 16 (02 Dec 2022 08:37) (13 - 23)  SpO2: 97% (02 Dec 2022 08:37) (95% - 100%)    Parameters below as of 02 Dec 2022 08:37  Patient On (Oxygen Delivery Method): room air               Lumbar Spine          Dressing removed: incision clean/dry/intact, functioning Hemovac drain in place          Sensation:  intact to light touch           Motor exam:                  Lower extremity                   HF         HAb       HAd       KF          KE         DF     PF     Ev       Inv     EHL                                               R   5/5        5/5          5/5       5/5         5/5       5/5   5/5    5/5     5/5     5/5                                               L    5/5        5/5          5/5       5/5         5/5       5/5   5/5    5/5     5/5     5/5           Calves supple and NT                                              2+ DP pulses          SCDs in place             LABS:                        14.0   10.25 )-----------( 160      ( 02 Dec 2022 06:00 )             42.1     12-02    140  |  105  |  15  ----------------------------<  137<H>  4.1   |  27  |  1.19    Ca    8.6      02 Dec 2022 06:00    A/P :  67y Male, stable,  s/p L5/S1 hemilaminectomy, L4/5 TLIF, L4/5, PSF L4/5 POD # 1  -    Pain control  -    DVT ppx: SCDs    -    Encourage Incentive Spirometry  -    Physical Therapy  -    Occupational Therapy  -    Weight bearing status: WBAT  -    Discharge Plan: home today  
Patient is a 67y old  Male who presents with a chief complaint of     INTERVAL HPI/OVERNIGHT EVENTS:  feeling great today,  didnt sleep well last night but pain in leg has improved.  surgical pain present but not bothering him.   looking forward to going home.     MEDICATIONS  (STANDING):  acetaminophen     Tablet .. 1000 milliGRAM(s) Oral every 8 hours  carvedilol 25 milliGRAM(s) Oral every 12 hours  insulin lispro (ADMELOG) corrective regimen sliding scale   SubCutaneous Before meals and at bedtime  lactated ringers. 1000 milliLiter(s) (50 mL/Hr) IV Continuous <Continuous>  metFORMIN 500 milliGRAM(s) Oral daily  pantoprazole    Tablet 40 milliGRAM(s) Oral before breakfast  polyethylene glycol 3350 17 Gram(s) Oral at bedtime  senna 2 Tablet(s) Oral at bedtime    MEDICATIONS  (PRN):  aluminum hydroxide/magnesium hydroxide/simethicone Suspension 30 milliLiter(s) Oral every 12 hours PRN Indigestion  cyclobenzaprine 10 milliGRAM(s) Oral every 8 hours PRN Muscle Spasm  dextrose Oral Gel 15 Gram(s) Oral once PRN Blood Glucose LESS THAN 70 milliGRAM(s)/deciliter  HYDROmorphone   Tablet 2 milliGRAM(s) Oral every 3 hours PRN Moderate Pain (4 - 6)  HYDROmorphone   Tablet 4 milliGRAM(s) Oral every 3 hours PRN Severe Pain (7 - 10)  HYDROmorphone  Injectable 0.5 milliGRAM(s) IV Push every 3 hours PRN Breakthrough pain  ondansetron Injectable 4 milliGRAM(s) IV Push every 6 hours PRN Nausea and/or Vomiting      Allergies  [This allergen will not trigger allergy alert] Seasonal Allergies (Rhinorrhea)  adhesives (Other)  fentanyl (Seizure)  Vasotec (Rash; Swelling)    REVIEW OF SYSTEMS:  CONSTITUTIONAL: No fever, weight loss, or fatigue  EYES: No eye pain, visual disturbances, or discharge  ENMT:  No difficulty hearing, tinnitus, vertigo; No sinus or throat pain  NECK: No pain or stiffness  BREASTS: No pain, masses, or nipple discharge  RESPIRATORY: No cough, wheezing, chills or hemoptysis; No shortness of breath  CARDIOVASCULAR: No chest pain, palpitations, lightheadedness, or leg swelling  GASTROINTESTINAL: No abdominal or epigastric pain. No nausea, vomiting, or hematemesis; No diarrhea or constipation. No melena or hematochezia.  GENITOURINARY: No dysuria, frequency, hematuria, or incontinence  NEUROLOGICAL: No headaches, memory loss, vertigo, loss of strength, numbness, or tremors  SKIN: No itching, burning, rashes, or lesions   LYMPH NODES: No enlarged glands  ENDOCRINE: No heat or cold intolerance; No hair loss; No polydipsia or polyuria  MUSCULOSKELETAL: see hpi  PSYCHIATRIC: No depression, anxiety, or mood swings  HEME/LYMPH: No easy bruising, or bleeding gums  ALLERGY AND IMMUNOLOGIC: No hives or eczema    Vital Signs Last 24 Hrs  T(C): 37 (02 Dec 2022 08:37), Max: 37 (02 Dec 2022 08:37)  T(F): 98.6 (02 Dec 2022 08:37), Max: 98.6 (02 Dec 2022 08:37)  HR: 82 (02 Dec 2022 08:37) (50 - 82)  BP: 99/59 (02 Dec 2022 08:37) (83/53 - 134/70)  BP(mean): --  RR: 16 (02 Dec 2022 08:37) (13 - 23)  SpO2: 97% (02 Dec 2022 08:37) (95% - 100%)    Parameters below as of 02 Dec 2022 08:37  Patient On (Oxygen Delivery Method): room air        PHYSICAL EXAM:  GENERAL: NAD, well-groomed, well-developed  HEAD:  Atraumatic, Normocephalic  EYES:  conjunctiva and sclera clear  ENMT: Moist mucous membranes  NECK: Supple, No JVD  NERVOUS SYSTEM:  Alert & Oriented X3, Good concentration; All 4 extremities mobile, no gross sensory deficits.   CHEST/LUNG: Clear to auscultation bilaterally; No rales, rhonchi, wheezing, or rubs  HEART: Regular rate and rhythm; No murmurs, rubs, or gallops  ABDOMEN: Soft, Nontender, Nondistended; Bowel sounds present  EXTREMITIES:  2+ Peripheral Pulses, No clubbing, cyanosis, or edema  LYMPH: No lymphadenopathy noted  SKIN: No rashes or lesions    LABS:                        14.0   10.25 )-----------( 160      ( 02 Dec 2022 06:00 )             42.1     02 Dec 2022 06:00    140    |  105    |  15     ----------------------------<  137    4.1     |  27     |  1.19     Ca    8.6        02 Dec 2022 06:00          CAPILLARY BLOOD GLUCOSE      POCT Blood Glucose.: 129 mg/dL (02 Dec 2022 07:28)  POCT Blood Glucose.: 189 mg/dL (01 Dec 2022 21:07)      RADIOLOGY & ADDITIONAL TESTS:    Imaging Personally Reviewed:  [ ] YES     Consultant(s) Notes Reviewed:      Care Discussed with Consultants/Other Providers:    Advanced Directives: [ ] DNR  [ ] No feeding tube  [ ] MOLST in chart  [ ] MOLST completed today  [ ] Unknown

## 2022-12-02 NOTE — DISCHARGE NOTE PROVIDER - NSDCFUADDINST_GEN_ALL_CORE_FT
- Call your doctor if you experience:  • An increase in pain not controlled by pain medication or change in activity or  position.  • Temperature greater than 101° F.  • Redness, increased swelling or foul smelling drainage from or around the  incision.  • Numbness, tingling or a change in color or temperature of the lower extremities.  • Call your doctor immediately if you experience chest pain, shortness of breath or calf pain.

## 2022-12-02 NOTE — DISCHARGE NOTE PROVIDER - NSDCCPTREATMENT_GEN_ALL_CORE_FT
PRINCIPAL PROCEDURE  Procedure: Fusion, spine, lumbar, TLIF, 1-2 levels  Findings and Treatment: L4-5; L5-S1 left hemilaminectomy

## 2022-12-02 NOTE — DISCHARGE NOTE PROVIDER - NSDCCPCAREPLAN_GEN_ALL_CORE_FT
PRINCIPAL DISCHARGE DIAGNOSIS  Diagnosis: Lumbar radiculopathy  Assessment and Plan of Treatment: No bending, lifting or twisting (NO BLT).   Do NOT drive a car.  You may be driven in a car.  You may shower if the dressing is the clear plastic type or if you cover the existing dressing with plastic and tape to prevent it from getting wet.  Change dressing with dry, sterile gauze and tape if it becomes loose, wet or soiled.   Observe low back precautions as described by the Physical Therapist.  Walking is your best exercise.  It is best not to remain in one position for long periods such as long car rides.

## 2022-12-02 NOTE — PROGRESS NOTE ADULT - PROBLEM SELECTOR PLAN 2
no signs of acute heart failure at this time  reduce post op fluids  BP stable  continue beta blocker with hold parameters  restart ARNI POD#2 with hold parameter  Hold spironolactone and farxiga now - restart at home.   Monitor remote tele while on 2W.

## 2022-12-02 NOTE — OCCUPATIONAL THERAPY INITIAL EVALUATION ADULT - PERTINENT HX OF CURRENT PROBLEM, REHAB EVAL
67 y.o. M p/w back pain. He has dad laminectomy x 2 in past with severe shooting pain radiating to buttocks and to BLE. Last couple of weeks progressively got worst and was advised surgery. He is now s/p L4-L5 TLIF and L%-S1 hemilaminectomy 12/1/22.

## 2022-12-07 ENCOUNTER — APPOINTMENT (OUTPATIENT)
Dept: ELECTROPHYSIOLOGY | Facility: CLINIC | Age: 67
End: 2022-12-07

## 2022-12-11 NOTE — OCCUPATIONAL THERAPY INITIAL EVALUATION ADULT - ORIENTATION, REHAB EVAL
Addended by: EMILY GLOVER on: 12/11/2022 09:52 AM     Modules accepted: Orders    
oriented to person, place, time and situation

## 2022-12-14 ENCOUNTER — APPOINTMENT (OUTPATIENT)
Dept: ORTHOPEDIC SURGERY | Facility: CLINIC | Age: 67
End: 2022-12-14
Payer: OTHER MISCELLANEOUS

## 2022-12-14 ENCOUNTER — APPOINTMENT (OUTPATIENT)
Dept: HEART FAILURE | Facility: CLINIC | Age: 67
End: 2022-12-14

## 2022-12-14 PROCEDURE — 99024 POSTOP FOLLOW-UP VISIT: CPT

## 2022-12-14 PROCEDURE — 72100 X-RAY EXAM L-S SPINE 2/3 VWS: CPT

## 2022-12-30 ENCOUNTER — NON-APPOINTMENT (OUTPATIENT)
Age: 67
End: 2022-12-30

## 2022-12-30 ENCOUNTER — LABORATORY RESULT (OUTPATIENT)
Age: 67
End: 2022-12-30

## 2022-12-30 ENCOUNTER — APPOINTMENT (OUTPATIENT)
Dept: CARDIOLOGY | Facility: CLINIC | Age: 67
End: 2022-12-30
Payer: MEDICARE

## 2022-12-30 VITALS
TEMPERATURE: 98.3 F | DIASTOLIC BLOOD PRESSURE: 60 MMHG | SYSTOLIC BLOOD PRESSURE: 120 MMHG | HEART RATE: 79 BPM | WEIGHT: 171 LBS | OXYGEN SATURATION: 95 % | BODY MASS INDEX: 27.48 KG/M2 | HEIGHT: 66 IN

## 2022-12-30 PROCEDURE — 93000 ELECTROCARDIOGRAM COMPLETE: CPT

## 2022-12-30 PROCEDURE — 99214 OFFICE O/P EST MOD 30 MIN: CPT | Mod: 25

## 2022-12-30 NOTE — HISTORY OF PRESENT ILLNESS
[FreeTextEntry1] : This is a 67 year male with a Pmhx of DM HTN HLD CM +AICD, systolic heart failure recent  revision laminectomy with Dr. Keven Curry on dec 1 who presents to the office for routine follow up\par pt reports feeling well post surgery. pt to start PT soon. reports pain is under control. Denies any chest pain or SOB dizziness or lightheadedness

## 2023-01-05 ENCOUNTER — OUTPATIENT (OUTPATIENT)
Dept: OUTPATIENT SERVICES | Facility: HOSPITAL | Age: 68
LOS: 1 days | End: 2023-01-05
Payer: MEDICARE

## 2023-01-05 ENCOUNTER — APPOINTMENT (OUTPATIENT)
Dept: CT IMAGING | Facility: CLINIC | Age: 68
End: 2023-01-05
Payer: MEDICARE

## 2023-01-05 DIAGNOSIS — I77.810 THORACIC AORTIC ECTASIA: ICD-10-CM

## 2023-01-05 DIAGNOSIS — Z95.810 PRESENCE OF AUTOMATIC (IMPLANTABLE) CARDIAC DEFIBRILLATOR: Chronic | ICD-10-CM

## 2023-01-05 DIAGNOSIS — S55.001A UNSPECIFIED INJURY OF ULNAR ARTERY AT FOREARM LEVEL, RIGHT ARM, INITIAL ENCOUNTER: Chronic | ICD-10-CM

## 2023-01-05 DIAGNOSIS — Z98.89 OTHER SPECIFIED POSTPROCEDURAL STATES: Chronic | ICD-10-CM

## 2023-01-05 DIAGNOSIS — Z98.890 OTHER SPECIFIED POSTPROCEDURAL STATES: Chronic | ICD-10-CM

## 2023-01-05 PROCEDURE — 71250 CT THORAX DX C-: CPT

## 2023-01-05 PROCEDURE — 71250 CT THORAX DX C-: CPT | Mod: 26

## 2023-01-19 NOTE — ASSESSMENT
[Indicate if, in your opinion, the incident that the patient described was the competent medical cause of this injury/illness.] : The incident that the patient described was the competent medical cause of this injury/illness: Yes [Indicate if the patient's complaints are consistent with his/her history of the injury/illness.] : Indicate if the patient's complaints are consistent with his/her history of the injury/illness: Yes [Yes] : Yes, it is consistent [Can the patient return to usual work activities as indicated? If yes, indicate date___] : The patient cannot return to usual work activities as indicated. [FreeTextEntry7] : Patient is retired [Physical Disability Permanent Partial] : permanently partial disabled

## 2023-01-19 NOTE — DISCUSSION/SUMMARY
[Medication Risks Reviewed] : Medication risks reviewed [de-identified] : Patient is pleased with the outcome of surgery with improved preoperative leg pain.  His low back pain is improving as well.  He is using Tylenol for pain relief.  At this point he will continue his current pain medications and I recommended follow-up in 1 month.  Repeat x-rays can be obtained at that time.  Overall he is pleased with the outcome of surgery and has no new complaints.  He will follow-up with his cardiologist as well.\par \par The patient was educated regarding their condition, treatment options as well as prescribed course of treatment. \par Risks and benefits as well as alternatives to the proposed treatment were also provided to the patient \par They were given the opportunity to have all their questions answered to their satisfaction.\par \par Vital signs were reviewed with the patient and the patient was instructed to followup with their primary care provider for further management. There were no PAs or scribes used in the evaluation, exam or treatment plan discussion. The surgeon was the primary evaluating or treating physician as noted above.

## 2023-01-19 NOTE — HISTORY OF PRESENT ILLNESS
[FreeTextEntry1] : Patient is here today for his first post operative office visit on his TLIF 12/1/22. Patient states everyday less pain. Patient states pain level is a 5  worse with sitting to standing and bending. Patient is taking tylenol.

## 2023-01-19 NOTE — PHYSICAL EXAM
[Stooped] : stooped [Cane] : ambulates with cane [SLR] : negative straight leg raise [LE] : Sensory: Intact in bilateral lower extremities [1+] : left ankle jerk 1+ [Plantar Reflex Right Only] : absent on the right [Plantar Reflex Left Only] : absent on the left [DTR Reflexes Clonus Of Right Ankle (___ Beats)] : absent on the right [DTR Reflexes Clonus Of Left Ankle (___ Beats)] : absent on the left [de-identified] : Seen today with his wife.  \par Incision is clean dry intact with no dehiscence or drainage. [de-identified] : AP and lateral lumbar spine demonstrate pedicle screw and bhupinder construct at the L4-5 level in good position.  Interbody cages seen at L4-5.  No implant loosening or migration.  Minimal straightening of lumbar lordosis.  Posterolateral bone graft noted.  Calcification anteriorly with ossification of the ala of noted which was noted preop as well.

## 2023-01-19 NOTE — REASON FOR VISIT
[Post Operative Visit] : a post operative visit for [Workers' Comp: Date of Injury: _______] : This visit is related to worker's compensation. Date of Injury: [unfilled] [Back Pain] : back pain [FreeTextEntry2] : TLIF  12/1/22

## 2023-01-25 ENCOUNTER — APPOINTMENT (OUTPATIENT)
Dept: ORTHOPEDIC SURGERY | Facility: CLINIC | Age: 68
End: 2023-01-25
Payer: OTHER MISCELLANEOUS

## 2023-01-25 VITALS — SYSTOLIC BLOOD PRESSURE: 116 MMHG | HEART RATE: 73 BPM | DIASTOLIC BLOOD PRESSURE: 71 MMHG

## 2023-01-25 DIAGNOSIS — Z98.1 ARTHRODESIS STATUS: ICD-10-CM

## 2023-01-25 DIAGNOSIS — M54.9 DORSALGIA, UNSPECIFIED: ICD-10-CM

## 2023-01-25 PROCEDURE — 99024 POSTOP FOLLOW-UP VISIT: CPT

## 2023-01-25 PROCEDURE — 72100 X-RAY EXAM L-S SPINE 2/3 VWS: CPT

## 2023-01-25 NOTE — DISCUSSION/SUMMARY
[Medication Risks Reviewed] : Medication risks reviewed [de-identified] : Patient is doing well following his decompression fusion surgery at the L4-5 level.  He has reported some occasional discomfort in his left leg.  He has been doing a lot of bending and stretching exercises and I recommend caution with activity modification.  Physical therapy was prescribed for him.  He is scheduled to travel to [place] tomorrow and will back next week.  Recommend start physical therapy then.  Recommended use of a lumbosacral orthosis during his travels.  He can continue use of muscle relaxants on an as-needed basis as well as Tylenol.\par \par Overall he is pleased with the outcome of surgery.  No new complaints today.\par \par The patient was educated regarding their condition, treatment options as well as prescribed course of treatment. \par Risks and benefits as well as alternatives to the proposed treatment were also provided to the patient \par They were given the opportunity to have all their questions answered to their satisfaction.\par \par Vital signs were reviewed with the patient and the patient was instructed to followup with their primary care provider for further management. There were no PAs or scribes used in the evaluation, exam or treatment plan discussion. The surgeon was the primary evaluating or treating physician as noted above.

## 2023-01-25 NOTE — ASSESSMENT
[Indicate if, in your opinion, the incident that the patient described was the competent medical cause of this injury/illness.] : The incident that the patient described was the competent medical cause of this injury/illness: Yes [Indicate if the patient's complaints are consistent with his/her history of the injury/illness.] : Indicate if the patient's complaints are consistent with his/her history of the injury/illness: Yes [Yes] : Yes, it is consistent [Can the patient return to usual work activities as indicated? If yes, indicate date___] : The patient cannot return to usual work activities as indicated. [FreeTextEntry7] : n/a [Physical Disability Permanent Partial] : permanently partial disabled

## 2023-01-25 NOTE — PHYSICAL EXAM
[Normal] : Gait: normal [SLR] : negative straight leg raise [LE] : Sensory: Intact in bilateral lower extremities [1+] : left ankle jerk 1+ [Plantar Reflex Right Only] : absent on the right [Plantar Reflex Left Only] : absent on the left [DTR Reflexes Clonus Of Right Ankle (___ Beats)] : absent on the right [DTR Reflexes Clonus Of Left Ankle (___ Beats)] : absent on the left [DP] : dorsalis pedis 2+ and symmetric bilaterally [de-identified] : Well-healed lumbar incision with minimal subcutaneous edema.  No erythema or drainage noted.  Ambulates with a non-ataxic nonantalgic gait.  He is able to forward flex to his ankles and extend 30 degrees without pain. [de-identified] : AP and lateral image lumbar spine demonstrates pedicle screw and bhupinder construct at the L4-5 level in good position.  No implant loosening or migration.  Interbody cages seen at L4-5.  Posterolateral bone graft and interbody fusion graft noted.  No significant scoliosis and normal lumbar lordosis.

## 2023-01-25 NOTE — REASON FOR VISIT
[Post Operative Visit] : a post operative visit for [Workers' Comp: Date of Injury: _______] : This visit is related to worker's compensation. Date of Injury: [unfilled] [FreeTextEntry2] : TLIF 12/1/2022, DOI 9/21/1998 Retired

## 2023-01-25 NOTE — HISTORY OF PRESENT ILLNESS
[FreeTextEntry1] : Patient is here for S/P TLIF 12/1/2022\par Patient notes that pain has decreased since last visit to 3/10 \par Mild radiation of pain to leg and tightness with prolonged sitting \par Has been treating pain with exercises at home such as walking.  [Has the patient missed work because of the injury/illness?] : The patient has missed work because of the injury/illness. [No] : The patient is currently not working.

## 2023-02-07 ENCOUNTER — NON-APPOINTMENT (OUTPATIENT)
Age: 68
End: 2023-02-07

## 2023-02-28 ENCOUNTER — APPOINTMENT (OUTPATIENT)
Dept: SURGERY | Facility: CLINIC | Age: 68
End: 2023-02-28
Payer: MEDICARE

## 2023-02-28 DIAGNOSIS — Z78.9 OTHER SPECIFIED HEALTH STATUS: ICD-10-CM

## 2023-02-28 PROCEDURE — 99203 OFFICE O/P NEW LOW 30 MIN: CPT

## 2023-02-28 RX ORDER — PITAVASTATIN CALCIUM 4.18 MG/1
4 TABLET, FILM COATED ORAL DAILY
Qty: 90 | Refills: 1 | Status: DISCONTINUED | COMMUNITY
Start: 2020-08-31 | End: 2023-02-28

## 2023-02-28 RX ORDER — ASCORBIC ACID 500 MG
TABLET ORAL
Refills: 0 | Status: ACTIVE | COMMUNITY

## 2023-02-28 RX ORDER — METFORMIN ER 750 MG 750 MG/1
750 TABLET ORAL DAILY
Qty: 90 | Refills: 2 | Status: DISCONTINUED | COMMUNITY
Start: 2021-01-29 | End: 2023-02-28

## 2023-02-28 NOTE — PHYSICAL EXAM
[Normal Breath Sounds] : Normal breath sounds [Normal Heart Sounds] : normal heart sounds [No Rash or Lesion] : No rash or lesion [Alert] : alert [Oriented to Person] : oriented to person [Oriented to Place] : oriented to place [Oriented to Time] : oriented to time [Calm] : calm [No HSM] : no hepatosplenomegaly [de-identified] : WNL [de-identified] : WNL [de-identified] : CHRISTOPHERL [de-identified] : Soft, nontender, small reducible right inguinal hernia.  No palpable left inguinal hernia.  Tiny fat-containing umbilical hernia. [de-identified] : WNL ROM [de-identified] : WNL

## 2023-02-28 NOTE — CONSULT LETTER
[Dear  ___] : Dear  [unfilled], [Consult Letter:] : I had the pleasure of evaluating your patient, [unfilled]. [Please see my note below.] : Please see my note below. [Consult Closing:] : Thank you very much for allowing me to participate in the care of this patient.  If you have any questions, please do not hesitate to contact me. [Sincerely,] : Sincerely, [FreeTextEntry3] : Sameer Thurman M.D., F.A.C.S, F.A.S.C.R.S [DrSai  ___] : Dr. GAYTAN

## 2023-02-28 NOTE — ASSESSMENT
[FreeTextEntry1] : In summary the patient has a minimally symptomatic small reducible right inguinal hernia.  He is recovering from back surgery.  I reassured him that there is no indication for urgent surgery to fix his hernia.  I will see him in 6 months once he is recovered from back surgery and if his hernia is symptomatic we will recommend elective repair with mesh.  I explained the risks benefits and alternatives including the risks of bleeding infection recurrent spermatic cord injury and prolonged postoperative pain and numbness.

## 2023-02-28 NOTE — HISTORY OF PRESENT ILLNESS
[de-identified] : Yash is a 67 y/o male here for a consultation visit, possible abdominal hernia. \par \par Today pt reports no pain. Normal BMs, denies constipation or diarrhea. Denies nausea and vomiting. Good appetite. Not taking any anticoagulants. Doesnt see a bulge but does feel something in right groin area since August 2022 after pt picked up something heavy, denies hearing gurgling noises, and does have discomfort from area when sneezing or coughing. Takes baby aspirin, has an ICD.  He recently had back surgery.

## 2023-03-08 ENCOUNTER — FORM ENCOUNTER (OUTPATIENT)
Age: 68
End: 2023-03-08

## 2023-03-08 ENCOUNTER — APPOINTMENT (OUTPATIENT)
Dept: ELECTROPHYSIOLOGY | Facility: CLINIC | Age: 68
End: 2023-03-08

## 2023-03-09 ENCOUNTER — APPOINTMENT (OUTPATIENT)
Dept: ELECTROPHYSIOLOGY | Facility: CLINIC | Age: 68
End: 2023-03-09
Payer: MEDICARE

## 2023-03-09 ENCOUNTER — NON-APPOINTMENT (OUTPATIENT)
Age: 68
End: 2023-03-09

## 2023-03-09 PROCEDURE — 93295 DEV INTERROG REMOTE 1/2/MLT: CPT

## 2023-03-09 PROCEDURE — 93296 REM INTERROG EVL PM/IDS: CPT

## 2023-03-22 ENCOUNTER — APPOINTMENT (OUTPATIENT)
Dept: HEART FAILURE | Facility: CLINIC | Age: 68
End: 2023-03-22
Payer: MEDICARE

## 2023-03-22 VITALS
HEIGHT: 66 IN | OXYGEN SATURATION: 98 % | HEART RATE: 77 BPM | TEMPERATURE: 98.4 F | WEIGHT: 178 LBS | BODY MASS INDEX: 28.61 KG/M2 | SYSTOLIC BLOOD PRESSURE: 127 MMHG | DIASTOLIC BLOOD PRESSURE: 75 MMHG

## 2023-03-22 DIAGNOSIS — I50.22 CHRONIC SYSTOLIC (CONGESTIVE) HEART FAILURE: ICD-10-CM

## 2023-03-22 DIAGNOSIS — I42.8 OTHER CARDIOMYOPATHIES: ICD-10-CM

## 2023-03-22 DIAGNOSIS — Z95.810 PRESENCE OF AUTOMATIC (IMPLANTABLE) CARDIAC DEFIBRILLATOR: ICD-10-CM

## 2023-03-22 DIAGNOSIS — I42.0 DILATED CARDIOMYOPATHY: ICD-10-CM

## 2023-03-22 PROCEDURE — 99214 OFFICE O/P EST MOD 30 MIN: CPT

## 2023-04-02 PROBLEM — I42.8 NONISCHEMIC CARDIOMYOPATHY: Status: ACTIVE | Noted: 2022-06-11

## 2023-04-02 PROBLEM — Z95.810 CARDIAC RESYNCHRONIZATION THERAPY DEFIBRILLATOR (CRT-D) IN PLACE: Status: ACTIVE | Noted: 2022-06-11

## 2023-04-02 PROBLEM — I42.0 DILATED CARDIOMYOPATHY: Status: ACTIVE | Noted: 2021-04-05

## 2023-04-02 PROBLEM — I50.22 CHRONIC SYSTOLIC HEART FAILURE, ACC/AHA STAGE C: Status: ACTIVE | Noted: 2022-06-11

## 2023-04-02 NOTE — CARDIOLOGY SUMMARY
[de-identified] : \par 5/27/22 SR. V-paced\par  [de-identified] : \par 12/2/22 TTE: LVIDd 5.4 cm, LVEF 40-45%, mild LVH (septum 1.44, PW 1.18), Grade 1 DD, normal RV size and function, mild LAE, normal RA, trace MR, trivial TR, mild AI, est RVSP 22.7 mmHg , IVC normal size with respiratory size variation greater than 50%\par \par 5/2022 TTE: LVEF 38% LVIDd 4.98cm, Grade I DD, mild AI, RV nl size/function, RVSP 27mmHg, dilated Asc Ao 3.9cm\par \par 4/6/21 TTE: LVIDd 5.3, LVEF 41%, increased LV wall thickness, mild global LV hypokenesis, grade I diastolic dysfunction, normal RVSF, mild LA enlargement, Mild MR, mild AR,  mild TR,  [de-identified] : \par 5/27/22 Device:  no arrhythmia, no therapies. AP <1%, BiV pacing 99%. Bi-V optimization done ot the best as LV epicardial lead is not in optimal position. \par  [de-identified] : \par [facility] 5/6/11: Normal Coronaries\par

## 2023-04-02 NOTE — END OF VISIT
[FreeTextEntry3] :  IKailey MD independently performed a history and physical examination of the patient, and formulated the management plan.\par I have reviewed the note by NIGEL Dunn and agree with the documented findings and plan of care.\par  [Time Spent: ___ minutes] : I have spent [unfilled] minutes of time on the encounter.

## 2023-04-02 NOTE — REASON FOR VISIT
[Cardiac Failure] : cardiac failure [FreeTextEntry1] : \par Cards: Terry Grady MD\par EP: Terry Carbone MD \par Endo: Santo Chou MD

## 2023-04-02 NOTE — HISTORY OF PRESENT ILLNESS
[FreeTextEntry1] : Mr. Camara is a 68 year old man with a PMH chronic systolic HF ACC/AHA stage C, NICM (LVEF improved to 40-45% with GDMT, LVIDd 5.4 cm) s/p CRT-D (with LV epicardial lead)  in 7/2019, covid infection '20, HLD, HTN, DM (A1c 7.2%), lung Nodules, Adrenal Nodule, proteinuria, VitamiN D Deficiency, and Neutropenia, who presents for routine follow-up of his cardiomyopathy \par \par He has followed with Dr. Grady for about 20 years when he was 1st told he had a LBBB. In 2009 was on vacation and felt generalized fatigued and ?arrhythmia. At that time had reduced LVEF of 20% so  and underwent Biv ICD  implant. He was started on BB and baby ASA. He has had longstanding follow up and management of his HF with Dr. Grady. It seems he was on Benicar but at some point it was discontinued due to lightheadedness.  He's had serial  TTEs in [employer] which revealed LVEF ~40-45%. His most recent TTE was done on 5/26 and it showed LVEF 38%. He has been started on Entresto recently [facility] then. He also underwent nuclear stress testing and prelim report indicates mildly dilated LV with normal perfusion. Pt denies any recent HF hospitalizations or ED visits. His physical activity is limited by sciatica pain. No h/o ICD shocks. \par \par Since last seen in August 2022, doing well and reports feeling excellent. He has been following with Dr. Grady in interim, underwent TTE in December 2022 revealing mild improvement of LVEF to 40-45% from 38%. He then underwent elective revision laminectomy with Dr. Curry with notable improvement in function capacity and mobility. Continue to participate in PT. Recently walked few miles without SOB and has no troubles with inclines or stairs. He has not visited the ED or been hospitalized for HF. He denies LH/dizziness, CP, palpitations, orthopnea, PND,  ABD discomfort and LE swelling. \par \par Of note, he recently established care with surgery, Dr. Thurman for evaluation of reducible inguinal hernia and is consider mesh repair in future. \par \par \par \par

## 2023-04-02 NOTE — ASSESSMENT
[FreeTextEntry1] : # Chronic systolic HF ACC/AHA stage C, NIYHA I-II symptoms\par - In setting of NICMP, LVEF improved to 40-45% from prior ~20% in 2009 with medical therapies\par - GDMT: Continue Farxiga 10mg daily, Coreg 25mg BID, Entresto  mg BID and Spironolactone 25 mg QD\par - Diuretics: euvolemic off loop diuretics\par - Device: s/p CRT-D (LV epicardial lead, not in ideal position) Bi-v 99%. No shocks. \par - Does not qualify for cardiac rehab given improved LVEF\par - Labs: from 12/30/22: K 4.1, Cr 0.98 and normal pro-BNP of 67\par \par # HTN\par - well controlled with above GDMT\par \par # Nonobstructive CAD\par - no signs of ischemia\par - ASA/Livalo\par \par # DMT2\par - A1c 7.4\par - on Metformin and SGLT2-i\par \par # R inguinal hernia, reducible\par - following surgery, Dr. Estrella and considering mesh repair in future\par \par Follow-up with Dr. Kay on an annual basis and in interim, continue to follow Dr. Grady \par

## 2023-04-02 NOTE — DISCUSSION/SUMMARY
[FreeTextEntry1] : 69 y/o man with ACC/AHA stage C, NICM (LVEF improved to 40-45% with GDMT, LVIDd 5.4 cm) s/p CRT-D (with LV epicardial lead)  in 7/2019, covid infection '20, HLD, HTN, DM (A1c 7.2%), lung Nodules, Adrenal Nodule, proteinuria, VitamiN D Deficiency, and Neutropenia, who has done well tolerating excellent dosages of neurohormonal blockade with improvement in cardiac function. He is euvolemic and well compensated, endorsing NYHA Class I. Plan as above. \par

## 2023-04-02 NOTE — PHYSICAL EXAM
[No Rash] : no rash [Moves all extremities] : moves all extremities [Alert and Oriented] : alert and oriented [No Xanthelasma] : no xanthelasma [Normal] : no edema, no cyanosis, no clubbing, no varicosities [de-identified] : JVP 6-8 cm of H2O, no HJR [de-identified] : warm peripherally

## 2023-04-18 RX ORDER — EPINEPHRINE 0.3 MG/.3ML
0.3 INJECTION INTRAMUSCULAR
Qty: 1 | Refills: 0 | Status: ACTIVE | COMMUNITY
Start: 2022-01-28 | End: 1900-01-01

## 2023-05-11 NOTE — PATIENT PROFILE ADULT - FUNCTIONAL SCREEN CURRENT LEVEL: SWALLOWING (IF SCORE 2 OR MORE FOR ANY ITEM, CONSULT REHAB SERVICES), MLM)
CHIEF COMPLAINT:  Office Visit and Consultation by Dr. Dover      HISTORY OF PRESENT ILLNESS: Patient is here for eye exam. Patient states a couple weeks ago he started getting crusting on both of his eyes. PT states he was rubbing both eyes and scratched his right eye. PT then saw Dr. Trejo, was started on abx drops every 2 hours, ointment at bedtime , then saw DR. Crandall, added oral abx, then saw dr. dover again who called here and advised to increase abx to qid.   PT states eye is slightly better today. PT states light is bothering hm, vision is blurry.     Tech notes reviewed.    ASSESSMENT/PLAN  1. Corneal ulcers OD  - natural history of disease discussed with patient  - recommend cont emycin qid  - dec vigamox qid  - monitor    2.  Dry Eyes  - natural history of disease discussed with patient  - recommend tears QID  - monitor    Patient's assessment and plan discussed in detail with patient.  All questions answered.    Return in about 5 days (around 5/16/2023) for follow up. or sooner as needed.       0 = swallows foods/liquids without difficulty

## 2023-05-31 ENCOUNTER — OUTPATIENT (OUTPATIENT)
Dept: OUTPATIENT SERVICES | Facility: HOSPITAL | Age: 68
LOS: 1 days | End: 2023-05-31
Payer: MEDICARE

## 2023-05-31 VITALS
HEIGHT: 67 IN | WEIGHT: 179.9 LBS | SYSTOLIC BLOOD PRESSURE: 127 MMHG | HEART RATE: 68 BPM | RESPIRATION RATE: 15 BRPM | DIASTOLIC BLOOD PRESSURE: 76 MMHG | TEMPERATURE: 98 F | OXYGEN SATURATION: 96 %

## 2023-05-31 DIAGNOSIS — S55.001A UNSPECIFIED INJURY OF ULNAR ARTERY AT FOREARM LEVEL, RIGHT ARM, INITIAL ENCOUNTER: Chronic | ICD-10-CM

## 2023-05-31 DIAGNOSIS — Z95.810 PRESENCE OF AUTOMATIC (IMPLANTABLE) CARDIAC DEFIBRILLATOR: Chronic | ICD-10-CM

## 2023-05-31 DIAGNOSIS — Z98.89 OTHER SPECIFIED POSTPROCEDURAL STATES: Chronic | ICD-10-CM

## 2023-05-31 DIAGNOSIS — Z98.890 OTHER SPECIFIED POSTPROCEDURAL STATES: Chronic | ICD-10-CM

## 2023-05-31 DIAGNOSIS — Z01.818 ENCOUNTER FOR OTHER PREPROCEDURAL EXAMINATION: ICD-10-CM

## 2023-05-31 DIAGNOSIS — Z12.11 ENCOUNTER FOR SCREENING FOR MALIGNANT NEOPLASM OF COLON: ICD-10-CM

## 2023-05-31 LAB
A1C WITH ESTIMATED AVERAGE GLUCOSE RESULT: 7.3 % — HIGH (ref 4–5.6)
ANION GAP SERPL CALC-SCNC: 15 MMOL/L — SIGNIFICANT CHANGE UP (ref 5–17)
BUN SERPL-MCNC: 19 MG/DL — SIGNIFICANT CHANGE UP (ref 7–23)
CALCIUM SERPL-MCNC: 9.6 MG/DL — SIGNIFICANT CHANGE UP (ref 8.4–10.5)
CHLORIDE SERPL-SCNC: 101 MMOL/L — SIGNIFICANT CHANGE UP (ref 96–108)
CO2 SERPL-SCNC: 21 MMOL/L — LOW (ref 22–31)
CREAT SERPL-MCNC: 1.04 MG/DL — SIGNIFICANT CHANGE UP (ref 0.5–1.3)
EGFR: 78 ML/MIN/1.73M2 — SIGNIFICANT CHANGE UP
ESTIMATED AVERAGE GLUCOSE: 163 MG/DL — HIGH (ref 68–114)
GLUCOSE SERPL-MCNC: 150 MG/DL — HIGH (ref 70–99)
HCT VFR BLD CALC: 47.6 % — SIGNIFICANT CHANGE UP (ref 39–50)
HGB BLD-MCNC: 16 G/DL — SIGNIFICANT CHANGE UP (ref 13–17)
MCHC RBC-ENTMCNC: 28.7 PG — SIGNIFICANT CHANGE UP (ref 27–34)
MCHC RBC-ENTMCNC: 33.6 GM/DL — SIGNIFICANT CHANGE UP (ref 32–36)
MCV RBC AUTO: 85.3 FL — SIGNIFICANT CHANGE UP (ref 80–100)
NRBC # BLD: 0 /100 WBCS — SIGNIFICANT CHANGE UP (ref 0–0)
PLATELET # BLD AUTO: 199 K/UL — SIGNIFICANT CHANGE UP (ref 150–400)
POTASSIUM SERPL-MCNC: 4.3 MMOL/L — SIGNIFICANT CHANGE UP (ref 3.5–5.3)
POTASSIUM SERPL-SCNC: 4.3 MMOL/L — SIGNIFICANT CHANGE UP (ref 3.5–5.3)
RBC # BLD: 5.58 M/UL — SIGNIFICANT CHANGE UP (ref 4.2–5.8)
RBC # FLD: 16.6 % — HIGH (ref 10.3–14.5)
SODIUM SERPL-SCNC: 137 MMOL/L — SIGNIFICANT CHANGE UP (ref 135–145)
WBC # BLD: 2.44 K/UL — LOW (ref 3.8–10.5)
WBC # FLD AUTO: 2.44 K/UL — LOW (ref 3.8–10.5)

## 2023-05-31 PROCEDURE — 80048 BASIC METABOLIC PNL TOTAL CA: CPT

## 2023-05-31 PROCEDURE — 85027 COMPLETE CBC AUTOMATED: CPT

## 2023-05-31 PROCEDURE — 83036 HEMOGLOBIN GLYCOSYLATED A1C: CPT

## 2023-05-31 PROCEDURE — G0463: CPT

## 2023-05-31 NOTE — H&P PST ADULT - PATIENT'S PREFERRED PRONOUN
Keep dressing dry, clean, and intact  Minimal weightbearing to right lower extremity   Elevate right lower extremity at all times  Ice behind knee as needed  Text Dr. Gina Mendoza [phone] with questions/concerns DISCHARGE SUMMARY from your Nurse    The following personal items collected during your admission are returned to you:   Dental Appliance: Dental Appliances: Uppers, With patient  Vision: Visual Aid: None  Hearing Aid:    Jewelry:    Clothing: Clothing: Pants, Shirt, Undergarments, Footwear, Jacket/Coat (to locker)  Other Valuables:    Valuables sent to safe:      PATIENT INSTRUCTIONS:    After general anesthesia or intravenous sedation, for 24 hours or while taking prescription Narcotics:  Limit your activities  Do not drive and operate hazardous machinery  Do not make important personal or business decisions  Do  not drink alcoholic beverages  If you have not urinated within 8 hours after discharge, please contact your surgeon on call. Report the following to your surgeon:  Excessive pain, swelling, redness or odor of or around the surgical area  Temperature over 100.5  Nausea and vomiting lasting longer than 4 hours or if unable to take medications  Any signs of decreased circulation or nerve impairment to extremity: change in color, persistent  numbness, tingling, coldness or increase pain  Any questions    COUGH AND DEEP BREATHE    Breathing deep and coughing are very important exercises to do after surgery. Deep breathing and coughing open the little air tubes and air sacks in your lungs. You take deep breaths every day. You may not even notice - it is just something you do when you sigh or yawn. It is a natural exercise you do to keep these air passages open. After surgery, take deep breaths and cough, on purpose. Coughing and deep breathing help prevent bronchitis and pneumonia after surgery.   If you had chest or belly surgery, use a pillow as a \"hug buddy\" and hold it tightly to your chest or belly when you cough. DIRECTIONS:  Take 10 to 15 slow deep breaths every hour while awake. Breathe in deeply, and hold it for 2 seconds. Exhale slowly through puckered lips, like blowing up a balloon. After every 4th or 5th deep breath, hug your pillow to your chest or belly and give a hard, deep cough. Yes, it will probably hurt. But doing this exercise is very important part of healing after surgery. Take your pain medicine to help you do this exercise without too much pain. IF YOU HAVE BEEN DIAGNOSED WITH SLEEP APNEA, PLEASE USE YOUR SLEEP APNEA DEVICE OR CPAP MACHINE WHEN YOU INTEND TO NAP AFTER TAKING PAIN MEDICATION. Ankle Pumps    Ankle pumps increase the circulation of oxygenated blood to your lower extremities and decrease your risk for circulation problems such as blood clots. They also stretch the muscles, tendons and ligaments in your foot and ankle, and prevent joint contracture in the ankle and foot, especially after surgeries on the legs. It is important to do ankle pump exercises regularly after surgery because immobility increases your risk for developing a blood clot. Your doctor may also have you take an Aspirin for the next few days as well. If your doctor did not ask you to take an Aspirin, consult with him before starting Aspirin therapy on your own. Slowly point your foot forward, feeling the muscles on the top of your lower leg stretch, and hold this position for 5 seconds. Next, pull your foot back toward you as far as possible, stretching the calf muscles, and hold that position for 5 seconds. Repeat with the other foot. Perform 10 repetitions every hour while awake for both ankles if possible (down and then up with the foot once is one repetition). You should feel gentle stretching of the muscles in your lower leg when doing this exercise.   If you feel pain, or your range of motion is limited, don't  Push too hard. Only go the limit your joint and muscles will let you go. If you have increasing pain, progressively worsening leg warmth or swelling, STOP the exercise and call your doctor. Below is information about the medications your doctor is prescribing after your visit:    Other information in your discharge envelope:  []     PRESCRIPTIONS  []     SCHOOL/WORK NOTE  []     INFECTION PREVENTION  []     [address]  []     REGIONAL NERVE BLOCK ON QUE PAMPHLET   []     EXPAREL  []     HANDICAP APPLICATION         These are general instructions for a healthy lifestyle:    *  Please give a list of your current medications to your Primary Care Provider. *  Please update this list whenever your medications are discontinued, doses are      changed, or new medications (including over-the-counter products) are added. *  Please carry medication information at all times in case of emergency situations. About Smoking  No smoking / No tobacco products / Avoid exposure to second hand smoke    Surgeon General's Warning:  Quitting smoking now greatly reduces serious risk to your health. Obesity, smoking, and sedentary lifestyle greatly increases your risk for illness and disease. A healthy diet, regular physical exercise & weight monitoring are important for maintaining a healthy lifestyle. Congestive Heart Failure  You may be retaining fluid if you have a history of heart failure or if you experience any of the following symptoms:  Weight gain of 3 pounds or more overnight or 5 pounds in a week, increased swelling in our hands or feet or shortness of breath while lying flat in bed. Please call your doctor as soon as you notice any of these symptoms; do not wait until your next office visit.     Recognize signs and symptoms of STROKE:  F - face looks uneven  A - arms unable to move or move even  S - speech slurred or non-existent  T - time-call 911 as soon as signs and symptoms begin-DO NOT go Back to bed or wait to see if you get better-TIME IS BRAIN. Warning signs of HEART ATTACK  Call 911 if you have these symptoms    Chest discomfort. Most heart attacks involve discomfort in the center of the chest that lasts more than a few minutes, or that goes away and comes back. It can feel like uncomfortable pressure, squeezing, fullness, or pain. Discomfort in other areas of the upper body. Symptoms can include pain or discomfort in one or both        Arms, the back, neck, jaw, or stomach. Shortness of breath with or without chest discomfort. Other signs may include breaking out in a cold sweat, nausea, or lightheadedness    Don't wait more than five minutes to call 911 - MINUTES MATTER! Fast action can save your life. Calling 911 is almost always the fastest way to get lifesaving treatment. Emergency Medical Services staff can begin treatment when they arrive - up to an hour sooner than if someone gets to the hospital by car. FRANCES[facility] PEREZ MEDICATION AND SIDE EFFECT GUIDE    The [facility] MEDICATION AND SIDE EFFECT GUIDE was provided to the PATIENT AND CARE PROVIDER. Information provided includes instruction about drug purpose and common side effects for the following medications:                Going Home After A  Peripheral Nerve Block    Patient Information    The anesthesiology team has provided for your pain control through a technique called regional anesthesia. As the name implies, anesthesia (decreased or no pain, sensation, or motor control) has been provided to a specific region, whether that be an arm or a leg. How does this happen?  you might ask.     While you were sleepy, the anesthesia provider provided medicine to a specific group of nerves either in the neck/shoulder region or in the groin and/or buttock region, similar to the way a dentist might numb a tooth (teeth.)  They typically use an ultrasound machine to know where the medicine is placed in relation to the nerves they wish to numb up or block.   What this means is that for the next few hours, you should expect to have a numb limb. Below are some things we wish for you to read and be familiar with concerning your anesthetized limb. Caring For a Blocked Body Part    General Considerations: The numbness may last up to 24 hours  You must protect your arm or leg. The blocked extremity is numb, weak, and difficult for your brain to locate and communicate with. To do this you should:  Pay attention to the position of the blocked limb at all times. Be very careful when placing hot or cod items on a numb limb. You could cause tissue damage like burns or frostbite if you are unable to feel temperature. Carefully follow your discharge instructions regarding the use of these therapies in you post-operative care. Carefully pad the affected limb. Normally we continually move and adjust the position of our bodies without thinking about it. This movement and continuous repositioning helps to prevent injuries from immobility. When a limb is numb it still requires this care  Be extremely careful not to bump or hit the numb body part. This can result in an unrecognized injury, at lease until the blocked limb wakes up. It can also result in worse pain of your already post-surgical limb. Arm/Shoulder Blocks: You may experience a droopy eyelid, nasal stuffiness, and redness of the eye after receiving an arm/shoulder block. This is called Kalis Syndrome, and is very common. There is no need for concern. You may also experience mild hoarseness, but all of these symptoms should resolve within 24 hours. Some patients may experience mild shortness of breath. A sitting position will help alleviate this and it should resolve within 24 hours. If you experience significant or progressive worsening of the shortness of breath, seek medical attention immediately.     Knee/Foot Blocks:  DO NOT use the blocked leg to walk, balance or support yourself. Your leg will not be able to balance your weight properly while any part of your leg is numb. You are at a RISK for [address]. Be careful not to drag your foot along the ground or stub your toes. Contact Phone Numbers    CALL 911 IN CASE OF AN EMERGENCY. For all other non-emergency inquiries call the [facility]  at [phone] and ask for the anesthesiologist on call to be paged. Him/He

## 2023-05-31 NOTE — H&P PST ADULT - HISTORY OF PRESENT ILLNESS
69 y/o M with PMHx of HLD, HTN, DM, lung nodules, adrenal nodule, proteinuria, Vit D deficiency, neutropenia (work up negative years ago, follows with Dr. Castillo at [facility]), chronic systolic HF ACC/AHA stage C, NICM (LVEF improved to 40-45% with GDMT, LVIDd 5.4 cm) s/p CRT-D (with LV epicardial lead) in 7/2019, longstanding follow up and management of his HF with Dr. Grady, underwent TTE in December 2022 revealing mild improvement of LVEF to 40-45% from 38%. Patient scheduled for Screening EGD/Colonoscopy 6/6/23.   Had laminectomy 12/2022 at [facility].  Patient denies SOB, CP, palpitation, blood in the stool or urine, N/V/D/C, HA, dizziness, seizures. Denies clotting or bleeding disorders.

## 2023-05-31 NOTE — H&P PST ADULT - OTHER CARE PROVIDERS
Kailey Pearson () [facility]     Dr. Castillo ([facility] [phone] months ago)

## 2023-05-31 NOTE — H&P PST ADULT - FUNCTIONAL STATUS
DASI score-7.68-Able to walk 3 miles, 1 flight of stairs a home multiple times per day, Denies SOB/4-10 METS

## 2023-05-31 NOTE — H&P PST ADULT - HEIGHT IN CM
Headache Monitoring: I recommended monitoring the headaches for now. There is no evidence of increased intracranial pressure. They were instructed to call if the headaches are worsening. 170.18

## 2023-05-31 NOTE — H&P PST ADULT - PROBLEM SELECTOR PLAN 1
Procedure: EGD/Colonoscopy 6/6/23  PST labs pending  AC: aspirin- may continue  Medication changes: Farxiga- last dose 6/2/23, Metformin last dose-6/5/23-AM  Cardiac evaluation pending-will call Dr. Bush for clearance

## 2023-05-31 NOTE — H&P PST ADULT - NSICDXPASTSURGICALHX_GEN_ALL_CORE_FT
PAST SURGICAL HISTORY:  AICD (automatic cardioverter/defibrillator) present implanted 9/2009  left chest   model # 3207- 36    AICD (automatic cardioverter/defibrillator) present 2009, battery change 9/2015    H/O laminectomy     History of lumbar laminectomy x2 - 2000, 2001 - s/p accident at work    Injury of right ulnar artery s/p surgical repair 2009

## 2023-06-02 ENCOUNTER — NON-APPOINTMENT (OUTPATIENT)
Age: 68
End: 2023-06-02

## 2023-06-02 ENCOUNTER — APPOINTMENT (OUTPATIENT)
Dept: CARDIOLOGY | Facility: CLINIC | Age: 68
End: 2023-06-02
Payer: MEDICARE

## 2023-06-02 ENCOUNTER — LABORATORY RESULT (OUTPATIENT)
Age: 68
End: 2023-06-02

## 2023-06-02 VITALS
HEART RATE: 70 BPM | OXYGEN SATURATION: 98 % | DIASTOLIC BLOOD PRESSURE: 80 MMHG | SYSTOLIC BLOOD PRESSURE: 130 MMHG | BODY MASS INDEX: 29.41 KG/M2 | WEIGHT: 183 LBS | TEMPERATURE: 98.2 F | HEIGHT: 66 IN

## 2023-06-02 DIAGNOSIS — M51.37 OTHER INTERVERTEBRAL DISC DEGENERATION, LUMBOSACRAL REGION: ICD-10-CM

## 2023-06-02 DIAGNOSIS — E55.9 VITAMIN D DEFICIENCY, UNSPECIFIED: ICD-10-CM

## 2023-06-02 DIAGNOSIS — Z12.11 ENCOUNTER FOR SCREENING FOR MALIGNANT NEOPLASM OF COLON: ICD-10-CM

## 2023-06-02 PROCEDURE — 93000 ELECTROCARDIOGRAM COMPLETE: CPT | Mod: NC

## 2023-06-02 PROCEDURE — 99214 OFFICE O/P EST MOD 30 MIN: CPT | Mod: 25

## 2023-06-02 NOTE — HISTORY OF PRESENT ILLNESS
[Preoperative Visit] : for a medical evaluation prior to surgery [Scheduled Procedure ___] : a [unfilled] [Date of Surgery ___] : on [unfilled] [Surgeon Name ___] : surgeon: [unfilled] [Good] : Good [Diabetes] : diabetes [Cardiovascular Disease] : cardiovascular disease [Anti-Platelet Agents] : anti-platelet agents [Frequent Aspirin Use] : frequent aspirin use [Prior Anesthesia] : Prior anesthesia [Electrocardiogram] : ~T an ECG ~C was performed [Echocardiogram] : ~T an echocardiogram ~C was performed [Prev Anesthesia Reaction] : previous anesthesia reaction [Fever] : no fever [Chills] : no chills [Fatigue] : no fatigue [Chest Pain] : no chest pain [Cough] : no cough [Dyspnea] : no dyspnea [Dysuria] : no dysuria [Urinary Frequency] : no urinary frequency [Nausea] : no nausea [Vomiting] : no vomiting [Diarrhea] : no diarrhea [Abdominal Pain] : no abdominal pain [Easy Bruising] : no easy bruising [Lower Extremity Swelling] : no lower extremity swelling [Poor Exercise Tolerance] : no poor exercise tolerance [Pulmonary Disease] : no pulmonary disease [Nicotine Dependence] : no nicotine dependence [Alcohol Use] : no  alcohol use [Renal Disease] : no renal disease [GI Disease] : no gastrointestinal disease [Sleep Apnea] : no sleep apnea [Thromboembolic Problems] : no thromboembolic problems [Clotting Disorder] : no clotting disorder [Frequent use of NSAIDs] : no use of NSAIDs [Bleeding Disorder] : no bleeding disorder [Impaired Immunity] : no impaired immunity [Transfusion Reaction] : no transfusion reaction [Steroid Use in Last 6 Months] : no steroid use in the last six months [FreeTextEntry1] : This is a 67 y/o male with a pmhx of DM HTN HLD CM +AICD, systolic heart failure here today for a pre op clearance for EGD and colonoscopy 6/6/23 with Dr. Church. Pt feels well and and has no acute issues. Of note, Pt reports he gets seizures with fentanyl. Pt is undergoing PT for back s/p spinal fusion 12/2022 \par Patient denies chest pain, dyspnea, palpitations, dizziness, syncope, changes in bowel/bladder habits or appetite.

## 2023-06-02 NOTE — PHYSICAL EXAM
[General Appearance - Well Developed] : well developed [Normal Appearance] : normal appearance [Well Groomed] : well groomed [General Appearance - Well Nourished] : well nourished [No Deformities] : no deformities [General Appearance - In No Acute Distress] : no acute distress [Normal Conjunctiva] : the conjunctiva exhibited no abnormalities [Eyelids - No Xanthelasma] : the eyelids demonstrated no xanthelasmas [Normal Oral Mucosa] : normal oral mucosa [No Oral Pallor] : no oral pallor [No Oral Cyanosis] : no oral cyanosis [Normal Jugular Venous A Waves Present] : normal jugular venous A waves present [Normal Jugular Venous V Waves Present] : normal jugular venous V waves present [No Jugular Venous Nguyễn A Waves] : no jugular venous nguyễn A waves [Respiration, Rhythm And Depth] : normal respiratory rhythm and effort [Auscultation Breath Sounds / Voice Sounds] : lungs were clear to auscultation bilaterally [Exaggerated Use Of Accessory Muscles For Inspiration] : no accessory muscle use [Heart Rate And Rhythm] : heart rate and rhythm were normal [Heart Sounds] : normal S1 and S2 [Murmurs] : no murmurs present [Abdomen Soft] : soft [Abdomen Tenderness] : non-tender [Abdomen Mass (___ Cm)] : no abdominal mass palpated [Abnormal Walk] : normal gait [Gait - Sufficient For Exercise Testing] : the gait was sufficient for exercise testing [Cyanosis, Localized] : no localized cyanosis [Nail Clubbing] : no clubbing of the fingernails [Petechial Hemorrhages (___cm)] : no petechial hemorrhages [] : no rash [Skin Color & Pigmentation] : normal skin color and pigmentation [No Venous Stasis] : no venous stasis [Skin Lesions] : no skin lesions [No Skin Ulcers] : no skin ulcer [No Xanthoma] : no  xanthoma was observed [Oriented To Time, Place, And Person] : oriented to person, place, and time [Affect] : the affect was normal [Mood] : the mood was normal [No Anxiety] : not feeling anxious

## 2023-06-06 ENCOUNTER — OUTPATIENT (OUTPATIENT)
Dept: OUTPATIENT SERVICES | Facility: HOSPITAL | Age: 68
LOS: 1 days | End: 2023-06-06
Payer: MEDICARE

## 2023-06-06 ENCOUNTER — NON-APPOINTMENT (OUTPATIENT)
Age: 68
End: 2023-06-06

## 2023-06-06 ENCOUNTER — TRANSCRIPTION ENCOUNTER (OUTPATIENT)
Age: 68
End: 2023-06-06

## 2023-06-06 VITALS
HEIGHT: 67 IN | OXYGEN SATURATION: 100 % | DIASTOLIC BLOOD PRESSURE: 81 MMHG | HEART RATE: 68 BPM | TEMPERATURE: 97 F | RESPIRATION RATE: 12 BRPM | WEIGHT: 177.91 LBS | SYSTOLIC BLOOD PRESSURE: 134 MMHG

## 2023-06-06 VITALS
HEART RATE: 65 BPM | DIASTOLIC BLOOD PRESSURE: 78 MMHG | RESPIRATION RATE: 20 BRPM | SYSTOLIC BLOOD PRESSURE: 119 MMHG | OXYGEN SATURATION: 97 %

## 2023-06-06 DIAGNOSIS — Z98.890 OTHER SPECIFIED POSTPROCEDURAL STATES: Chronic | ICD-10-CM

## 2023-06-06 DIAGNOSIS — Z95.810 PRESENCE OF AUTOMATIC (IMPLANTABLE) CARDIAC DEFIBRILLATOR: Chronic | ICD-10-CM

## 2023-06-06 DIAGNOSIS — Z12.11 ENCOUNTER FOR SCREENING FOR MALIGNANT NEOPLASM OF COLON: ICD-10-CM

## 2023-06-06 DIAGNOSIS — S55.001A UNSPECIFIED INJURY OF ULNAR ARTERY AT FOREARM LEVEL, RIGHT ARM, INITIAL ENCOUNTER: Chronic | ICD-10-CM

## 2023-06-06 DIAGNOSIS — Z98.89 OTHER SPECIFIED POSTPROCEDURAL STATES: Chronic | ICD-10-CM

## 2023-06-06 LAB
25(OH)D3 SERPL-MCNC: 48.6 NG/ML
ALBUMIN SERPL ELPH-MCNC: 4.3 G/DL
ALP BLD-CCNC: 51 U/L
ALT SERPL-CCNC: 9 U/L
ANION GAP SERPL CALC-SCNC: 14 MMOL/L
APPEARANCE: CLEAR
AST SERPL-CCNC: 17 U/L
BACTERIA: NEGATIVE /HPF
BILIRUB DIRECT SERPL-MCNC: 0.1 MG/DL
BILIRUB INDIRECT SERPL-MCNC: 0.3 MG/DL
BILIRUB SERPL-MCNC: 0.4 MG/DL
BILIRUBIN URINE: NEGATIVE
BLOOD URINE: NEGATIVE
BUN SERPL-MCNC: 29 MG/DL
CALCIUM SERPL-MCNC: 10.4 MG/DL
CAST: 0 /LPF
CHLORIDE SERPL-SCNC: 104 MMOL/L
CHOLEST SERPL-MCNC: 198 MG/DL
CO2 SERPL-SCNC: 20 MMOL/L
COLOR: YELLOW
CREAT SERPL-MCNC: 1.33 MG/DL
EGFR: 58 ML/MIN/1.73M2
EPITHELIAL CELLS: 0 /HPF
ESTIMATED AVERAGE GLUCOSE: 163 MG/DL
GLUCOSE BLDC GLUCOMTR-MCNC: 134 MG/DL — HIGH (ref 70–99)
GLUCOSE QUALITATIVE U: >=1000 MG/DL
GLUCOSE SERPL-MCNC: 149 MG/DL
HBA1C MFR BLD HPLC: 7.3 %
HDLC SERPL-MCNC: 75 MG/DL
KETONES URINE: NEGATIVE MG/DL
LDLC SERPL CALC-MCNC: 106 MG/DL
LEUKOCYTE ESTERASE URINE: NEGATIVE
MAGNESIUM SERPL-MCNC: 2.3 MG/DL
MICROSCOPIC-UA: NORMAL
NITRITE URINE: NEGATIVE
NONHDLC SERPL-MCNC: 123 MG/DL
NT-PROBNP SERPL-MCNC: <36 PG/ML
PH URINE: 6
POTASSIUM SERPL-SCNC: 4.6 MMOL/L
PROT SERPL-MCNC: 7 G/DL
PROTEIN URINE: NEGATIVE MG/DL
RED BLOOD CELLS URINE: 0 /HPF
SODIUM SERPL-SCNC: 138 MMOL/L
SPECIFIC GRAVITY URINE: 1.03
T4 FREE SERPL-MCNC: 1.2 NG/DL
TRIGL SERPL-MCNC: 83 MG/DL
TSH SERPL-ACNC: 1.93 UIU/ML
UROBILINOGEN URINE: 0.2 MG/DL
WHITE BLOOD CELLS URINE: 0 /HPF

## 2023-06-06 PROCEDURE — 88305 TISSUE EXAM BY PATHOLOGIST: CPT | Mod: 26

## 2023-06-06 PROCEDURE — 45385 COLONOSCOPY W/LESION REMOVAL: CPT | Mod: PT

## 2023-06-06 PROCEDURE — 82962 GLUCOSE BLOOD TEST: CPT

## 2023-06-06 PROCEDURE — C1889: CPT

## 2023-06-06 PROCEDURE — 94640 AIRWAY INHALATION TREATMENT: CPT

## 2023-06-06 PROCEDURE — 43239 EGD BIOPSY SINGLE/MULTIPLE: CPT

## 2023-06-06 PROCEDURE — 88305 TISSUE EXAM BY PATHOLOGIST: CPT

## 2023-06-06 DEVICE — CLIP RESOLUTION 360 235CM: Type: IMPLANTABLE DEVICE | Status: FUNCTIONAL

## 2023-06-06 RX ORDER — SODIUM CHLORIDE 9 MG/ML
500 INJECTION INTRAMUSCULAR; INTRAVENOUS; SUBCUTANEOUS
Refills: 0 | Status: COMPLETED | OUTPATIENT
Start: 2023-06-06 | End: 2023-06-06

## 2023-06-06 RX ORDER — LIDOCAINE HCL 20 MG/ML
4 VIAL (ML) INJECTION ONCE
Refills: 0 | Status: COMPLETED | OUTPATIENT
Start: 2023-06-06 | End: 2023-06-06

## 2023-06-06 RX ADMIN — SODIUM CHLORIDE 30 MILLILITER(S): 9 INJECTION INTRAMUSCULAR; INTRAVENOUS; SUBCUTANEOUS at 08:30

## 2023-06-06 RX ADMIN — Medication 4 MILLILITER(S): at 08:30

## 2023-06-06 NOTE — ASU DISCHARGE PLAN (ADULT/PEDIATRIC) - NS MD DC FALL RISK RISK
For information on Fall & Injury Prevention, visit: [email or web address] OR  [email or web address] OR  https://www.cdc.gov/steadi/patient.html

## 2023-06-06 NOTE — ASU PREOP CHECKLIST - SURGICAL CONSENT
[address] INTERNAL MEDICINE AND MEDICATION MANAGEMENT  Carlie Arcos  Dept: [phone]  Dept Fax: [phone]: [phone]     Visit Date:  6/3/2021    Patient:  Karlene Johnson  YOB: 1996    HPI:     Chief Complaint   Patient presents with    Drug Problem     Ivette Long presents today for medical evaluation of severe opioid use disorder and right foot injury. MAT patient of Dr. Nam Victoria. Last seen him 6 weeks ago. States that he had been working in [place]. Was able to see a physician in [facility], and get a refill on his Suboxone. Urine positive for alcohol, buprenorphine, and THC. Denies any use. Complains of a right foot injury months ago, that has caused progressive loss of sensation to the right foot. Requesting referral to podiatry. Medications    Current Outpatient Medications:     cloNIDine (CATAPRES) 0.1 MG tablet, Take 1 tablet by mouth nightly, Disp: 30 tablet, Rfl: 0    buPROPion (WELLBUTRIN XL) 150 MG extended release tablet, Take 1 tablet by mouth every morning, Disp: 30 tablet, Rfl: 3    doxyLAMINE succinate (GNP SLEEP AID) 25 MG tablet, Take 1 tablet by mouth nightly as needed for Sleep, Disp: 30 tablet, Rfl: 0    ibuprofen (ADVIL;MOTRIN) 200 MG tablet, Take 400 mg by mouth every 6 hours as needed for Pain, Disp: , Rfl:     SUBOXONE 8-2 MG FILM SL film, Place 1 Film under the tongue 2 times daily for 3 days. , Disp: 6 Film, Rfl: 0    The patient has No Known Allergies. Past Medical History  Ivette Long  has a past medical history of Psychiatric problem. Past Surgical History  The patient  has no past surgical history on file. Family History  This patient's family history is not on file. He was adopted. Social History  Ivette Long  reports that he has been smoking cigarettes. He started smoking about 6 years ago. He has been smoking about 0.50 packs per day.  He quit smokeless tobacco use about 5 years ago. He reports current drug use. Frequency: 7.00 times per week. Drug: Marijuana. He reports that he does not drink alcohol. Health Maintenance:    Health Maintenance   Topic Date Due    Varicella vaccine (1 of 2 - 2-dose childhood series) Never done    Pneumococcal 0-64 years Vaccine (1 of 2 - PPSV23) Never done    HPV vaccine (1 - Male 2-dose series) Never done    COVID-19 Vaccine (1) Never done    Flu vaccine (Season Ended) 09/01/2021    DTaP/Tdap/Td vaccine (3 - Td or Tdap) 05/31/2030    Hepatitis C screen  Completed    HIV screen  Completed    Hepatitis A vaccine  Aged Out    Hepatitis B vaccine  Aged Out    Hib vaccine  Aged Out    Meningococcal (ACWY) vaccine  Aged Out       Subjective:      Review of Systems   Constitutional: Negative for chills, fatigue and fever. HENT: Negative for congestion, rhinorrhea, sinus pressure, sinus pain, sore throat, tinnitus and trouble swallowing. Eyes: Negative for photophobia and visual disturbance. Respiratory: Negative for cough, shortness of breath and wheezing. Cardiovascular: Negative for chest pain, palpitations and leg swelling. Gastrointestinal: Negative for abdominal distention, abdominal pain, blood in stool, constipation, diarrhea, nausea and vomiting. Endocrine: Negative for polydipsia, polyphagia and polyuria. Genitourinary: Negative for difficulty urinating, dysuria, frequency, hematuria and urgency. Musculoskeletal: Positive for arthralgias (right foot). Negative for myalgias. Skin: Negative for rash and wound. Neurological: Negative for dizziness, light-headedness and headaches. Psychiatric/Behavioral: Negative for dysphoric mood and sleep disturbance. The patient is not nervous/anxious. Objective:     BP (!) 154/83   Pulse 107   Temp 98.9 °F (37.2 °C) (Temporal)   Ht 6' 1\" (1.854 m)   Wt 166 lb (75.3 kg)   BMI 21.90 kg/m²     Physical Exam  Vitals reviewed.    Constitutional: General: He is not in acute distress. Appearance: Normal appearance. He is not ill-appearing. HENT:      Head: Normocephalic and atraumatic. Right Ear: External ear normal.      Left Ear: External ear normal.      Nose: Nose normal. No congestion or rhinorrhea. Mouth/Throat:      Mouth: Mucous membranes are moist.   Eyes:      Extraocular Movements: Extraocular movements intact. Conjunctiva/sclera: Conjunctivae normal.      Pupils: Pupils are equal, round, and reactive to light. Pulmonary:      Effort: Pulmonary effort is normal. No respiratory distress. Musculoskeletal:         General: Normal range of motion. Cervical back: Normal range of motion and neck supple. Right lower leg: No edema. Left lower leg: No edema. Skin:     General: Skin is warm and dry. Neurological:      General: No focal deficit present. Mental Status: He is alert and oriented to person, place, and time. Psychiatric:         Mood and Affect: Mood normal.         Behavior: Behavior normal.         Thought Content: Thought content normal.         Judgment: Judgment normal.         Labs Reviewed 6/3/2021:    Lab Results   Component Value Date    WBC 11.9 (H) 01/04/2017    HGB 14.4 01/04/2017    HCT 43.0 01/04/2017     01/04/2017    ALT 19 10/27/2016    AST 16 10/27/2016     01/04/2017    K 3.8 01/04/2017     01/04/2017    CREATININE 0.8 01/04/2017    BUN 14 01/04/2017    CO2 26 01/04/2017    TSH 0.046 (L) 09/16/2016       Assessment/Plan      1. Severe opioid use disorder (HCC)    - POCT Rapid Drug Screen  - Continue Suboxone 8 mg BID  - OARRS reviewed, no discrepancies  - Narcan at home  - Sustain from illicit drug use. Discussed potential for precipitated withdrawal and/or overdose. Understanding voiced. - Attend NA/AA meetings and/or arrange for individualized counseling     2.  Injury of right foot, subsequent encounter    - AFL - [place], JULIAN Eaton, [address], [facility] ERIN JAY II.WESLEY done

## 2023-06-06 NOTE — PRE PROCEDURE NOTE - PRE PROCEDURE EVALUATION
Pre-Endoscopy Evaluation    Referring Physician:  Toribio Church M.D.                          Procedure:  EGD/Colonoscopy    Indication for Procedure:  GERD, Screening    Sedation by Anesthesia [x]    PAST MEDICAL & SURGICAL HISTORY:  Dyslipidemia      Cardiomyopathy  ACC/AHA Stage C, chronic systolic heart failure with reduced EF, s/p AICD      Hypertension      Diverticulosis      GERD (gastroesophageal reflux disease)      Diverticulitis  past history      Lumbar radiculopathy      Hypertension      History of hemochromatosis  ? pt followed by hematologist no treatmnet -lab values is normal as per patient-will f/u labs      Type 2 diabetes mellitus      2019 novel coronavirus disease (COVID-19)  3/2020- no hospitalization      AICD (automatic cardioverter/defibrillator) present  implanted 9/2009  left chest   model # 3207- 36      History of lumbar laminectomy  x2 - 2000, 2001 - s/p accident at work      Injury of right ulnar artery  s/p surgical repair 2009      AICD (automatic cardioverter/defibrillator) present  2009, battery change 9/2015      H/O laminectomy          PMH of Gastroparesis [ ]  Gastric Surgery [ ]  Gastric Outlet Obstruction [ ]  None [x]    Allergies    [This allergen will not trigger allergy alert] Seasonal Allergies (Rhinorrhea)  Vasotec (Rash; Swelling)  adhesives (Other)  fentanyl (Seizure)    Latex allergy: [ ] yes [x] no    Medications:MEDICATIONS  (STANDING):  sodium chloride 0.9%. 500 milliLiter(s) (30 mL/Hr) IV Continuous <Continuous>    MEDICATIONS  (PRN):    Home Medications:  aspirin 81 mg oral delayed release tablet: 1 tab(s) orally once a day (may start Saturday 12/3) (06 Jun 2023 08:04)  calcium carbonate-magnesium chloride: 1 tab daily (06 Jun 2023 08:04)  carvedilol 25 mg oral tablet: 1 tab(s) orally every 12 hours (06 Jun 2023 08:04)  Cialis 5 mg oral tablet: 1 tab(s) orally once a day as needed for AS needed (06 Jun 2023 08:04)  Dexilant 60 mg oral delayed release capsule: 1 cap(s) orally once a day, As Needed (06 Jun 2023 08:04)  Entresto 97 mg-103 mg oral tablet: 1 tab(s) orally 2 times a day (06 Jun 2023 08:04)  Farxiga 10 mg oral tablet: 1 tab(s) orally once a day (06 Jun 2023 08:04)  metFORMIN 750 mg oral tablet, extended release: 1 tab(s) orally once a day (06 Jun 2023 08:04)  SPIRONOLACT 25MG TAB: tab(s) orally once a day (06 Jun 2023 08:04)  Vitamin D3 2000 intl units oral tablet: 1 tab(s) orally once a day (06 Jun 2023 08:04)      Smoking: [ ] yes  [x] no    AICD/PPM: [x] yes   [ ] no    Physical Examination:  Daily Height in cm: 170.18 (06 Jun 2023 07:51)    Daily   Vital Signs Last 24 Hrs  T(C): 36.3 (06 Jun 2023 07:51), Max: 36.3 (06 Jun 2023 07:51)  T(F): 97.4 (06 Jun 2023 07:51), Max: 97.4 (06 Jun 2023 07:51)  HR: 68 (06 Jun 2023 07:51) (68 - 68)  BP: 134/81 (06 Jun 2023 07:51) (134/81 - 134/81)  BP(mean): --  RR: 12 (06 Jun 2023 07:51) (12 - 12)  SpO2: 100% (06 Jun 2023 07:51) (100% - 100%)    Parameters below as of 06 Jun 2023 07:51  Patient On (Oxygen Delivery Method): room air    Constitutional: NAD    HEENT: PERRLA, EOMI,       Neck:  No JVD    Respiratory: CTAB/L    Cardiovascular: S1 and S2    Gastrointestinal: BS+, soft, NT/ND    Extremities: No peripheral edema    Neurological: A/O x 3, no focal deficits    Psychiatric: Normal mood, normal affect    : No Matta    Skin: No rashes    Comments:    ASA Class: I [ ]  II [ ]  III [x]  IV [ ]

## 2023-06-06 NOTE — ASU DISCHARGE PLAN (ADULT/PEDIATRIC) - CARE PROVIDER_API CALL
Toribio Church [facility]  Gastroenterology  [address], [address]  [place], [place] [id number]  Phone: [phone]  Fax: [phone]  Established Patient  Follow Up Time:

## 2023-06-06 NOTE — PRE PROCEDURE NOTE - TIME BILLING
Toribio Church MD, FACP, FACG, AGAF  [facility]  [phone]     After hours and weekend coverage GI service : [phone]

## 2023-06-07 ENCOUNTER — NON-APPOINTMENT (OUTPATIENT)
Age: 68
End: 2023-06-07

## 2023-06-07 NOTE — HISTORY OF PRESENT ILLNESS
[FreeTextEntry1] : This is a 65 year old gentlemen with a PMH of cardiomyopathy, hyperlipidemia, hypertension, AICD, DM, and Neutropenia presents today for follow up. Patient was positive with COVID in March, no residual symptoms at this time. Patient does note a soft, moveable lump to the side of his back. Patient denies dyspnea, palpitations, chest pain, nausea, vomiting, dizziness and lightheadedness.\par \par  PT presents to er with complaints of right ear pain, states he felt a bug go into his ear.

## 2023-06-08 LAB — SURGICAL PATHOLOGY STUDY: SIGNIFICANT CHANGE UP

## 2023-06-09 ENCOUNTER — NON-APPOINTMENT (OUTPATIENT)
Age: 68
End: 2023-06-09

## 2023-06-09 ENCOUNTER — APPOINTMENT (OUTPATIENT)
Dept: ELECTROPHYSIOLOGY | Facility: CLINIC | Age: 68
End: 2023-06-09
Payer: MEDICARE

## 2023-06-09 PROCEDURE — 93295 DEV INTERROG REMOTE 1/2/MLT: CPT

## 2023-06-09 PROCEDURE — 93296 REM INTERROG EVL PM/IDS: CPT

## 2023-06-13 ENCOUNTER — APPOINTMENT (OUTPATIENT)
Dept: CARDIOLOGY | Facility: CLINIC | Age: 68
End: 2023-06-13

## 2023-06-15 NOTE — H&P PST ADULT - BMI (KG/M2)
28.2 Partial Purse String (Simple) Text: Given the location of the defect and the characteristics of the surrounding skin a simple purse string closure was deemed most appropriate.  Undermining was performed circumferentially around the surgical defect.  A purse string suture was then placed and tightened. Wound tension only allowed a partial closure of the circular defect.

## 2023-07-17 ENCOUNTER — RX RENEWAL (OUTPATIENT)
Age: 68
End: 2023-07-17

## 2023-08-01 ENCOUNTER — APPOINTMENT (OUTPATIENT)
Dept: SURGERY | Facility: CLINIC | Age: 68
End: 2023-08-01
Payer: MEDICARE

## 2023-08-01 VITALS
SYSTOLIC BLOOD PRESSURE: 120 MMHG | HEART RATE: 73 BPM | TEMPERATURE: 97.1 F | DIASTOLIC BLOOD PRESSURE: 77 MMHG | RESPIRATION RATE: 17 BRPM | OXYGEN SATURATION: 98 %

## 2023-08-01 DIAGNOSIS — K42.0 UMBILICAL HERNIA WITH OBSTRUCTION, W/OUT GANGRENE: ICD-10-CM

## 2023-08-01 PROCEDURE — 99213 OFFICE O/P EST LOW 20 MIN: CPT

## 2023-08-01 NOTE — CONSULT LETTER
[Dear  ___] : Dear  [unfilled], [Consult Letter:] : I had the pleasure of evaluating your patient, [unfilled]. [Please see my note below.] : Please see my note below. [Consult Closing:] : Thank you very much for allowing me to participate in the care of this patient.  If you have any questions, please do not hesitate to contact me. [Sincerely,] : Sincerely, [FreeTextEntry3] : Sameer Thurman M.D., F.A.C.S, F.A.S.C.R.S

## 2023-08-01 NOTE — PHYSICAL EXAM
[Normal Breath Sounds] : Normal breath sounds [Normal Rate and Rhythm] : normal rate and rhythm [No HSM] : no hepatosplenomegaly [No Rash or Lesion] : No rash or lesion [Alert] : alert [Calm] : calm [de-identified] : nad [de-identified] : Soft, nontender, chronically incarcerated fat-containing umbilical hernia, moderate reducible nontender right inguinal hernia.  No palpable left inguinal hernia. [de-identified] : nl

## 2023-08-01 NOTE — ASSESSMENT
[FreeTextEntry1] : In summary the patient has a symptomatic reducible right inguinal hernia and a chronically incarcerated fat-containing umbilical hernia.  I recommended that both be electively repaired with mesh.  I explained the risks benefits and alternatives of surgery including the risks of bleeding infection recurrent spermatic cord injury and prolonged postoperative pain and numbness.  He has an AICD and will need cardiology clearance preoperatively.

## 2023-08-01 NOTE — HISTORY OF PRESENT ILLNESS
[de-identified] : Yash is a 69 y/o male here for a follow up visit, if RIH is symptomatic  Last seen on 2/28/23 - In summary the patient has a minimally symptomatic small reducible right inguinal hernia. He is recovering from back surgery. I reassured him that there is no indication for urgent surgery to fix his hernia. I will see him in 6 months once he is recovered from back surgery and if his hernia is symptomatic we will recommend elective repair with mesh. I explained the risks benefits and alternatives including the risks of bleeding infection recurrent spermatic cord injury and prolonged postoperative pain and numbness.  Today pt reports no pain. Does have bulge of right groin, has gotten slightly bigger, has discomfort occasionally with certain positions. Denies hearing gurgling noises from area. Denies constipation or diarrhea. Denies nausea or vomiting. Normal appetite. Taking baby aspirin, has a defibrillator.

## 2023-08-03 ENCOUNTER — NON-APPOINTMENT (OUTPATIENT)
Age: 68
End: 2023-08-03

## 2023-08-03 ENCOUNTER — APPOINTMENT (OUTPATIENT)
Dept: ELECTROPHYSIOLOGY | Facility: CLINIC | Age: 68
End: 2023-08-03
Payer: MEDICARE

## 2023-08-03 VITALS — SYSTOLIC BLOOD PRESSURE: 140 MMHG | DIASTOLIC BLOOD PRESSURE: 81 MMHG | HEART RATE: 63 BPM | OXYGEN SATURATION: 97 %

## 2023-08-03 PROCEDURE — 93000 ELECTROCARDIOGRAM COMPLETE: CPT | Mod: 59

## 2023-08-03 PROCEDURE — 93284 PRGRMG EVAL IMPLANTABLE DFB: CPT

## 2023-09-15 ENCOUNTER — OUTPATIENT (OUTPATIENT)
Dept: OUTPATIENT SERVICES | Facility: HOSPITAL | Age: 68
LOS: 1 days | End: 2023-09-15
Payer: MEDICARE

## 2023-09-15 VITALS
WEIGHT: 175.93 LBS | HEIGHT: 66 IN | OXYGEN SATURATION: 96 % | TEMPERATURE: 98 F | HEART RATE: 71 BPM | RESPIRATION RATE: 18 BRPM | SYSTOLIC BLOOD PRESSURE: 146 MMHG | DIASTOLIC BLOOD PRESSURE: 84 MMHG

## 2023-09-15 DIAGNOSIS — E11.9 TYPE 2 DIABETES MELLITUS WITHOUT COMPLICATIONS: ICD-10-CM

## 2023-09-15 DIAGNOSIS — K42.9 UMBILICAL HERNIA WITHOUT OBSTRUCTION OR GANGRENE: ICD-10-CM

## 2023-09-15 DIAGNOSIS — Z95.810 PRESENCE OF AUTOMATIC (IMPLANTABLE) CARDIAC DEFIBRILLATOR: Chronic | ICD-10-CM

## 2023-09-15 DIAGNOSIS — S55.001A UNSPECIFIED INJURY OF ULNAR ARTERY AT FOREARM LEVEL, RIGHT ARM, INITIAL ENCOUNTER: Chronic | ICD-10-CM

## 2023-09-15 DIAGNOSIS — K42.0 UMBILICAL HERNIA WITH OBSTRUCTION, WITHOUT GANGRENE: ICD-10-CM

## 2023-09-15 DIAGNOSIS — Z98.89 OTHER SPECIFIED POSTPROCEDURAL STATES: Chronic | ICD-10-CM

## 2023-09-15 DIAGNOSIS — Z01.818 ENCOUNTER FOR OTHER PREPROCEDURAL EXAMINATION: ICD-10-CM

## 2023-09-15 DIAGNOSIS — Z98.890 OTHER SPECIFIED POSTPROCEDURAL STATES: Chronic | ICD-10-CM

## 2023-09-15 LAB
A1C WITH ESTIMATED AVERAGE GLUCOSE RESULT: 6.9 % — HIGH (ref 4–5.6)
ANION GAP SERPL CALC-SCNC: 11 MMOL/L — SIGNIFICANT CHANGE UP (ref 5–17)
BLD GP AB SCN SERPL QL: NEGATIVE — SIGNIFICANT CHANGE UP
BUN SERPL-MCNC: 15 MG/DL — SIGNIFICANT CHANGE UP (ref 7–23)
CALCIUM SERPL-MCNC: 9.6 MG/DL — SIGNIFICANT CHANGE UP (ref 8.4–10.5)
CHLORIDE SERPL-SCNC: 105 MMOL/L — SIGNIFICANT CHANGE UP (ref 96–108)
CO2 SERPL-SCNC: 22 MMOL/L — SIGNIFICANT CHANGE UP (ref 22–31)
CREAT SERPL-MCNC: 1.01 MG/DL — SIGNIFICANT CHANGE UP (ref 0.5–1.3)
EGFR: 81 ML/MIN/1.73M2 — SIGNIFICANT CHANGE UP
ESTIMATED AVERAGE GLUCOSE: 151 MG/DL — HIGH (ref 68–114)
GLUCOSE SERPL-MCNC: 141 MG/DL — HIGH (ref 70–99)
HCT VFR BLD CALC: 51.1 % — HIGH (ref 39–50)
HGB BLD-MCNC: 17 G/DL — SIGNIFICANT CHANGE UP (ref 13–17)
MCHC RBC-ENTMCNC: 29.6 PG — SIGNIFICANT CHANGE UP (ref 27–34)
MCHC RBC-ENTMCNC: 33.3 GM/DL — SIGNIFICANT CHANGE UP (ref 32–36)
MCV RBC AUTO: 88.9 FL — SIGNIFICANT CHANGE UP (ref 80–100)
MRSA PCR RESULT.: SIGNIFICANT CHANGE UP
NRBC # BLD: 0 /100 WBCS — SIGNIFICANT CHANGE UP (ref 0–0)
PLATELET # BLD AUTO: 165 K/UL — SIGNIFICANT CHANGE UP (ref 150–400)
POTASSIUM SERPL-MCNC: 4.2 MMOL/L — SIGNIFICANT CHANGE UP (ref 3.5–5.3)
POTASSIUM SERPL-SCNC: 4.2 MMOL/L — SIGNIFICANT CHANGE UP (ref 3.5–5.3)
RBC # BLD: 5.75 M/UL — SIGNIFICANT CHANGE UP (ref 4.2–5.8)
RBC # FLD: 15 % — HIGH (ref 10.3–14.5)
RH IG SCN BLD-IMP: POSITIVE — SIGNIFICANT CHANGE UP
S AUREUS DNA NOSE QL NAA+PROBE: SIGNIFICANT CHANGE UP
SODIUM SERPL-SCNC: 138 MMOL/L — SIGNIFICANT CHANGE UP (ref 135–145)
WBC # BLD: 2.71 K/UL — LOW (ref 3.8–10.5)
WBC # FLD AUTO: 2.71 K/UL — LOW (ref 3.8–10.5)

## 2023-09-15 PROCEDURE — 86900 BLOOD TYPING SEROLOGIC ABO: CPT

## 2023-09-15 PROCEDURE — 85027 COMPLETE CBC AUTOMATED: CPT

## 2023-09-15 PROCEDURE — 36415 COLL VENOUS BLD VENIPUNCTURE: CPT

## 2023-09-15 PROCEDURE — G0463: CPT

## 2023-09-15 PROCEDURE — 87640 STAPH A DNA AMP PROBE: CPT

## 2023-09-15 PROCEDURE — 83036 HEMOGLOBIN GLYCOSYLATED A1C: CPT

## 2023-09-15 PROCEDURE — 87641 MR-STAPH DNA AMP PROBE: CPT

## 2023-09-15 PROCEDURE — 80048 BASIC METABOLIC PNL TOTAL CA: CPT

## 2023-09-15 PROCEDURE — 86850 RBC ANTIBODY SCREEN: CPT

## 2023-09-15 PROCEDURE — 86901 BLOOD TYPING SEROLOGIC RH(D): CPT

## 2023-09-15 RX ORDER — CEFOTETAN DISODIUM 1 G
2 VIAL (EA) INJECTION ONCE
Refills: 0 | Status: DISCONTINUED | OUTPATIENT
Start: 2023-10-05 | End: 2023-10-19

## 2023-09-15 NOTE — H&P PST ADULT - NS MD HP INPLANTS MED DEV
[employer] Model CD 3357-40C CRT-D, spine hardware/Automatic Implantable Cardioverter Defibrillator

## 2023-09-15 NOTE — H&P PST ADULT - MUSCULOSKELETAL
negative ROM intact/normal gait/strength 5/5 bilateral upper extremities/strength 5/5 bilateral lower extremities/back exam

## 2023-09-15 NOTE — H&P PST ADULT - PROBLEM SELECTOR PLAN 1
Plan for umbilical hernia repair and right inguinal hernia repair with Dr. Thurman on 10/5/23.   PST labs sent   Pre procedure surgical scrub and incentive spirometer instructions discussed

## 2023-09-15 NOTE — H&P PST ADULT - NSICDXPASTMEDICALHX_GEN_ALL_CORE_FT
PAST MEDICAL HISTORY:  2019 novel coronavirus disease (COVID-19) 3/2020- no hospitalization    Cardiomyopathy ACC/AHA Stage C, chronic systolic heart failure with reduced EF, s/p AICD    Diverticulitis past history    Diverticulosis     Dyslipidemia     GERD (gastroesophageal reflux disease)     History of hemochromatosis ? pt followed by hematologist no treatmnet -lab values is normal as per patient-will f/u labs    Hypertension     Hypertension     Inguinal hernia     Lumbar radiculopathy     Type 2 diabetes mellitus     Umbilical hernia

## 2023-09-15 NOTE — H&P PST ADULT - ASSESSMENT
DASI score: DASI score-8.97  DASI activity: Able to walk 3 miles, climbs steps multiple times per day, Denies SOB  Loose teeth or dentures: Denies   Airway: MP1

## 2023-09-15 NOTE — H&P PST ADULT - HISTORY OF PRESENT ILLNESS
67 y/o M with PMHx of HLD, HTN, DM, lung nodules, adrenal nodule, proteinuria, Vit D deficiency, neutropenia (work up negative years ago, follows with Dr. Castillo at [facility]), chronic systolic HF ACC/AHA stage C, NICM (LVEF improved to 40-45% with GDMT) s/p CRT-D (with LV epicardial lead) in 7/2019, longstanding follow up and management of his HF with Dr. Grady and Dr. Granados, under went TTE in December 2022 revealing mild improvement of LVEF to 40-45% from 38%.  Pt presents to PST with worsening right inguinal pain.  Plan for umbilical hernia repair and right inguinal hernia repair with Dr. Thurman on 10/5/23.

## 2023-09-15 NOTE — H&P PST ADULT - OTHER CARE PROVIDERS
Kailey Pearson () [facility]-3 months ago     Dr. Castillo-onc [facility] [phone] last visit 4 months ago

## 2023-09-20 ENCOUNTER — APPOINTMENT (OUTPATIENT)
Dept: CARDIOLOGY | Facility: CLINIC | Age: 68
End: 2023-09-20
Payer: MEDICARE

## 2023-09-20 VITALS
OXYGEN SATURATION: 98 % | HEART RATE: 68 BPM | HEIGHT: 66 IN | BODY MASS INDEX: 28.61 KG/M2 | TEMPERATURE: 96.7 F | SYSTOLIC BLOOD PRESSURE: 136 MMHG | DIASTOLIC BLOOD PRESSURE: 80 MMHG | WEIGHT: 178 LBS

## 2023-09-20 DIAGNOSIS — K40.90 UNILATERAL INGUINAL HERNIA, W/OUT OBSTRUCTION OR GANGRENE, NOT SPECIFIED AS RECURRENT: ICD-10-CM

## 2023-09-20 DIAGNOSIS — K42.9 UMBILICAL HERNIA W/OUT OBSTRUCTION OR GANGRENE: ICD-10-CM

## 2023-09-20 DIAGNOSIS — Z01.818 ENCOUNTER FOR OTHER PREPROCEDURAL EXAMINATION: ICD-10-CM

## 2023-09-20 PROCEDURE — 99214 OFFICE O/P EST MOD 30 MIN: CPT | Mod: 25

## 2023-09-20 PROCEDURE — 93000 ELECTROCARDIOGRAM COMPLETE: CPT | Mod: NC

## 2023-09-22 ENCOUNTER — NON-APPOINTMENT (OUTPATIENT)
Age: 68
End: 2023-09-22

## 2023-09-25 NOTE — PATIENT PROFILE ADULT - FAMILY NEEDS
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] Treatment Diagnosis:  I89.0     Lymphedema, not elsewhere classified and I97.2     Post-Mastectomy lymphedema syndrome    Referral Source:  Philip Rodriguez, [address]:   Payor: Clarke Ready / Plan: Clarke Ready / Product Type: *No Product type* /                     Patient  verified yes     Visit #   Current  / Total 6 20   Time   In / Out 3:20 PM 4:15 PM   Total Treatment Time 55   Total Timed Codes 55         SUBJECTIVE      Pain Level (0-10 scale):  0/10    Any medication changes, allergies to medications, adverse drug reactions, diagnosis change, or new procedure performed?: [x] No    [] Yes (see summary sheet for update)  Medications: Verified on Patient Summary List    Subjective functional status/changes:   \"I have movement in this shoulder that I didn't have before. \"     OBJECTIVE    Therapeutic Procedures: Tx Min Billable or 1:1 Min (if diff from Tx Min) Procedure, Rationale, Specifics   [id number] [phone] [id number] Manual Therapy (timed):  decrease pain, increase ROM, increase tissue extensibility, decrease edema, and decrease trigger points to improve patient's ability to progress to PLOF and address remaining functional goals. The manual therapy interventions were performed at a separate and distinct time from the therapeutic activities interventions . (see flow sheet as applicable)    Details if applicable:    Therapist performed soft tissue mobilization of R UE from elbow to breast and around R breast area to provide relief from R axillary web syndrome. Therapist also focused on an area of adhesion around the scar where her sentinel axillary node was removed.       10 10 [id number] Self Care/Home Management (timed):  improve patient knowledge and understanding of pain
no

## 2023-10-04 ENCOUNTER — TRANSCRIPTION ENCOUNTER (OUTPATIENT)
Age: 68
End: 2023-10-04

## 2023-10-05 ENCOUNTER — APPOINTMENT (OUTPATIENT)
Dept: SURGERY | Facility: HOSPITAL | Age: 68
End: 2023-10-05
Payer: MEDICARE

## 2023-10-05 ENCOUNTER — OUTPATIENT (OUTPATIENT)
Dept: INPATIENT UNIT | Facility: HOSPITAL | Age: 68
LOS: 1 days | End: 2023-10-05
Payer: MEDICARE

## 2023-10-05 ENCOUNTER — TRANSCRIPTION ENCOUNTER (OUTPATIENT)
Age: 68
End: 2023-10-05

## 2023-10-05 ENCOUNTER — APPOINTMENT (OUTPATIENT)
Dept: CARDIOLOGY | Facility: CLINIC | Age: 68
End: 2023-10-05

## 2023-10-05 ENCOUNTER — RESULT REVIEW (OUTPATIENT)
Age: 68
End: 2023-10-05

## 2023-10-05 VITALS
DIASTOLIC BLOOD PRESSURE: 75 MMHG | TEMPERATURE: 97 F | HEART RATE: 72 BPM | RESPIRATION RATE: 18 BRPM | SYSTOLIC BLOOD PRESSURE: 132 MMHG | OXYGEN SATURATION: 100 %

## 2023-10-05 VITALS
HEART RATE: 65 BPM | DIASTOLIC BLOOD PRESSURE: 80 MMHG | WEIGHT: 175.93 LBS | HEIGHT: 66 IN | OXYGEN SATURATION: 96 % | SYSTOLIC BLOOD PRESSURE: 131 MMHG | RESPIRATION RATE: 16 BRPM | TEMPERATURE: 98 F

## 2023-10-05 DIAGNOSIS — S55.001A UNSPECIFIED INJURY OF ULNAR ARTERY AT FOREARM LEVEL, RIGHT ARM, INITIAL ENCOUNTER: Chronic | ICD-10-CM

## 2023-10-05 DIAGNOSIS — K42.0 UMBILICAL HERNIA WITH OBSTRUCTION, WITHOUT GANGRENE: ICD-10-CM

## 2023-10-05 DIAGNOSIS — Z98.890 OTHER SPECIFIED POSTPROCEDURAL STATES: Chronic | ICD-10-CM

## 2023-10-05 DIAGNOSIS — Z98.89 OTHER SPECIFIED POSTPROCEDURAL STATES: Chronic | ICD-10-CM

## 2023-10-05 DIAGNOSIS — Z95.810 PRESENCE OF AUTOMATIC (IMPLANTABLE) CARDIAC DEFIBRILLATOR: Chronic | ICD-10-CM

## 2023-10-05 LAB
GLUCOSE BLDC GLUCOMTR-MCNC: 133 MG/DL — HIGH (ref 70–99)
GLUCOSE BLDC GLUCOMTR-MCNC: 145 MG/DL — HIGH (ref 70–99)

## 2023-10-05 PROCEDURE — 88302 TISSUE EXAM BY PATHOLOGIST: CPT | Mod: 26

## 2023-10-05 PROCEDURE — 49505 PRP I/HERN INIT REDUC >5 YR: CPT | Mod: RT

## 2023-10-05 PROCEDURE — C9399: CPT

## 2023-10-05 PROCEDURE — 49591 RPR AA HRN 1ST < 3 CM RDC: CPT

## 2023-10-05 PROCEDURE — 88302 TISSUE EXAM BY PATHOLOGIST: CPT

## 2023-10-05 PROCEDURE — C1781: CPT

## 2023-10-05 PROCEDURE — 82962 GLUCOSE BLOOD TEST: CPT

## 2023-10-05 DEVICE — MESH HERNIA TISS REINF 8X8CM: Type: IMPLANTABLE DEVICE | Site: RIGHT | Status: FUNCTIONAL

## 2023-10-05 DEVICE — MESH HERNIA VENTRAL PROCEED CIRCLE 6.4CM: Type: IMPLANTABLE DEVICE | Site: RIGHT | Status: FUNCTIONAL

## 2023-10-05 DEVICE — MESH HERNIA MARLEX 3 X 6": Type: IMPLANTABLE DEVICE | Site: RIGHT | Status: FUNCTIONAL

## 2023-10-05 RX ORDER — SPIRONOLACTONE 25 MG/1
0 TABLET, FILM COATED ORAL
Qty: 0 | Refills: 0 | DISCHARGE

## 2023-10-05 RX ORDER — CHLORHEXIDINE GLUCONATE 213 G/1000ML
1 SOLUTION TOPICAL ONCE
Refills: 0 | Status: DISCONTINUED | OUTPATIENT
Start: 2023-10-05 | End: 2023-10-05

## 2023-10-05 RX ORDER — METFORMIN HYDROCHLORIDE 850 MG/1
1 TABLET ORAL
Qty: 0 | Refills: 0 | DISCHARGE

## 2023-10-05 RX ORDER — HYDROMORPHONE HYDROCHLORIDE 2 MG/ML
0.5 INJECTION INTRAMUSCULAR; INTRAVENOUS; SUBCUTANEOUS
Refills: 0 | Status: DISCONTINUED | OUTPATIENT
Start: 2023-10-05 | End: 2023-10-05

## 2023-10-05 RX ORDER — CHOLECALCIFEROL (VITAMIN D3) 125 MCG
1 CAPSULE ORAL
Qty: 0 | Refills: 0 | DISCHARGE

## 2023-10-05 RX ORDER — LANOLIN ALCOHOL/MO/W.PET/CERES
0 CREAM (GRAM) TOPICAL
Qty: 0 | Refills: 0 | DISCHARGE

## 2023-10-05 RX ORDER — SACUBITRIL AND VALSARTAN 24; 26 MG/1; MG/1
1 TABLET, FILM COATED ORAL
Qty: 0 | Refills: 0 | DISCHARGE

## 2023-10-05 RX ORDER — OXYCODONE HYDROCHLORIDE 5 MG/1
1 TABLET ORAL
Qty: 10 | Refills: 0
Start: 2023-10-05

## 2023-10-05 RX ORDER — DAPAGLIFLOZIN 10 MG/1
1 TABLET, FILM COATED ORAL
Qty: 0 | Refills: 0 | DISCHARGE

## 2023-10-05 RX ORDER — TADALAFIL 10 MG/1
1 TABLET, FILM COATED ORAL
Refills: 0 | DISCHARGE

## 2023-10-05 RX ORDER — SODIUM CHLORIDE 9 MG/ML
3 INJECTION INTRAMUSCULAR; INTRAVENOUS; SUBCUTANEOUS EVERY 8 HOURS
Refills: 0 | Status: DISCONTINUED | OUTPATIENT
Start: 2023-10-05 | End: 2023-10-05

## 2023-10-05 RX ORDER — ACETAMINOPHEN 500 MG
975 TABLET ORAL EVERY 6 HOURS
Refills: 0 | Status: DISCONTINUED | OUTPATIENT
Start: 2023-10-05 | End: 2023-10-19

## 2023-10-05 RX ORDER — EVOLOCUMAB 140 MG/ML
140 INJECTION, SOLUTION SUBCUTANEOUS
Refills: 0 | DISCHARGE

## 2023-10-05 RX ORDER — IBUPROFEN 200 MG
400 TABLET ORAL EVERY 6 HOURS
Refills: 0 | Status: DISCONTINUED | OUTPATIENT
Start: 2023-10-05 | End: 2023-10-19

## 2023-10-05 RX ORDER — ACETAMINOPHEN 500 MG
3 TABLET ORAL
Qty: 0 | Refills: 0 | DISCHARGE
Start: 2023-10-05

## 2023-10-05 RX ORDER — OXYCODONE HYDROCHLORIDE 5 MG/1
5 TABLET ORAL EVERY 4 HOURS
Refills: 0 | Status: DISCONTINUED | OUTPATIENT
Start: 2023-10-05 | End: 2023-10-05

## 2023-10-05 RX ORDER — LIDOCAINE HCL 20 MG/ML
0.2 VIAL (ML) INJECTION ONCE
Refills: 0 | Status: DISCONTINUED | OUTPATIENT
Start: 2023-10-05 | End: 2023-10-05

## 2023-10-05 RX ORDER — IBUPROFEN 200 MG
1 TABLET ORAL
Qty: 0 | Refills: 0 | DISCHARGE
Start: 2023-10-05

## 2023-10-05 RX ORDER — ONDANSETRON 8 MG/1
4 TABLET, FILM COATED ORAL ONCE
Refills: 0 | Status: DISCONTINUED | OUTPATIENT
Start: 2023-10-05 | End: 2023-10-05

## 2023-10-05 RX ADMIN — HYDROMORPHONE HYDROCHLORIDE 0.5 MILLIGRAM(S): 2 INJECTION INTRAMUSCULAR; INTRAVENOUS; SUBCUTANEOUS at 10:00

## 2023-10-05 RX ADMIN — HYDROMORPHONE HYDROCHLORIDE 0.5 MILLIGRAM(S): 2 INJECTION INTRAMUSCULAR; INTRAVENOUS; SUBCUTANEOUS at 10:15

## 2023-10-05 NOTE — BRIEF OPERATIVE NOTE - NSICDXBRIEFPOSTOP_GEN_ALL_CORE_FT
POST-OP DIAGNOSIS:  Umbilical hernia 05-Oct-2023 12:46:50  Shawna Guerrero  Right inguinal hernia 05-Oct-2023 12:46:57  Shawna Guerrero

## 2023-10-05 NOTE — PATIENT PROFILE ADULT - FALL HARM RISK - UNIVERSAL INTERVENTIONS
Bed in lowest position, wheels locked, appropriate side rails in place/Call bell, personal items and telephone in reach/Instruct patient to call for assistance before getting out of bed or chair/Non-slip footwear when patient is out of bed/Bow to call system/Physically safe environment - no spills, clutter or unnecessary equipment/Purposeful Proactive Rounding/Room/bathroom lighting operational, light cord in reach

## 2023-10-05 NOTE — ASU DISCHARGE PLAN (ADULT/PEDIATRIC) - NS MD DC FALL RISK RISK
For information on Fall & Injury Prevention, visit: [email or web address] OR  [email or web address] OR  https://www.cdc.gov/steadi/patient.html MEDICINE, PROGRESS NOTE [phone]    JUWAN DOMÍNGUEZ 58y [id number]    Patient seen and examined.  Patient is a 58y old  Male who presents with a chief complaint of Chest pain, confusion (22 Feb 2018 03:00)  Pt feels much better, pain is better controlled with pca.    PAST MEDICAL & SURGICAL HISTORY:  Sciatica of right side  Essential hypertension  No significant past surgical history    MEDICATIONS  (STANDING):  ALBUTerol/ipratropium for Nebulization 3 milliLiter(s) Nebulizer every 6 hours  folic acid 1 milliGRAM(s) Oral daily  heparin  Injectable 5000 Unit(s) SubCutaneous every 8 hours  HYDROmorphone PCA (1 mG/mL) 30 milliLiter(s) PCA Continuous PCA Continuous  imipenem/cilastatin  IVPB      imipenem/cilastatin  IVPB 1000 milliGRAM(s) IV Intermittent every 8 hours  lactobacillus acidophilus 1 Tablet(s) Oral three times a day with meals  lidocaine   Patch 1 Patch Transdermal daily  losartan 50 milliGRAM(s) Oral daily  multivitamin 1 Tablet(s) Oral daily  sodium chloride 0.9%. 1000 milliLiter(s) (10 mL/Hr) IV Continuous <Continuous>  thiamine 100 milliGRAM(s) Oral daily    MEDICATIONS  (PRN):  HYDROmorphone PCA (1 mG/mL) Rescue Clinician Bolus 0.5 milliGRAM(s) IV Push every 15 minutes PRN for Pain Scale GREATER THAN 6  naloxone Injectable 0.1 milliGRAM(s) IV Push every 3 minutes PRN For ANY of the following changes in patient status:  A. RR LESS THAN 10 breaths per minute, B. Oxygen saturation LESS THAN 90%, C. Sedation score of 6    Allergies    No Known Allergies    Intolerances        PHYSICAL EXAM:  Constitutional: NAD  HEENT: Normocephalic, EOMI  Neck:  No JVD  Respiratory: CTA B/L, No wheezes  Cardiovascular: S1, S2, RRR, + systolic murmur  Gastrointestinal: BS+, soft, NT/ND, obese  Extremities: dec peripheral edema Right LE, irrigation VAC in place on right foot  Neurological: AAOX3, no focal deficits  Psychiatric: Normal mood, normal affect  : + Sosa    Vital Signs Last 24 Hrs  T(C): 36.6 (03 Mar 2018 13:56), Max: 38.1 (03 Mar 2018 00:35)  T(F): 97.8 (03 Mar 2018 13:56), Max: 100.6 (03 Mar 2018 00:35)  HR: 94 (03 Mar 2018 13:56) (68 - 95)  BP: 110/64 (03 Mar 2018 13:56) (103/62 - 129/78)  BP(mean): --  RR: 18 (03 Mar 2018 13:56) (18 - 19)  SpO2: 96% (03 Mar 2018 13:56) (96% - 98%)  I&O's Summary    02 Mar 2018 07:01  -  03 Mar 2018 07:00  --------------------------------------------------------  IN: 1420 mL / OUT: 6100 mL / NET: -4680 mL    03 Mar 2018 07:01  -  03 Mar 2018 16:51  --------------------------------------------------------  IN: 960 mL / OUT: 1200 mL / NET: -240 mL        LABS:                        11.4   12.01 )-----------( 520      ( 03 Mar 2018 07:55 )             36.2     03-03    136  |  97  |  15  ----------------------------<  121<H>  4.1   |  29  |  0.85    Ca    8.7      03 Mar 2018 06:52  Phos  3.2     03-03  Mg     2.5     03-03    TPro  7.8  /  Alb  2.4<L>  /  TBili  0.4  /  DBili  x   /  AST  47<H>  /  ALT  57<H>  /  AlkPhos  78  03-03        Magnesium, Serum: 2.5 mg/dL (03-03 @ 06:52)    Urinalysis Basic - ( 03 Mar 2018 08:11 )    Color: x / Appearance: x / SG: x / pH: x  Gluc: x / Ketone: x  / Bili: x / Urobili: x   Blood: x / Protein: x / Nitrite: x   Leuk Esterase: x / RBC: 16 /HPF / WBC 3 /HPF   Sq Epi: x / Non Sq Epi: 0 /HPF / Bacteria: Negative      EXAM:  FOOT COMPLETE RIGHT (MIN 3 VIEW)                          PROCEDURE DATE:  03/02/2018      INTERPRETATION:  EXAMINATION: 3 views of the right foot    CLINICAL INFORMATION: nec fasc, 2nd debridement    Comparison: 2/25/2018.    IMPRESSION:     There is a defect in the soft tissues along the dorsum of the forefoot   and along the lateral aspect of the midfoot. There are adjacent foci of   air in the soft tissues which may be related to postsurgical change or   necrotizing infection.     There are no areas of cortical destruction or periosteal reaction to   suggest osteomyelitis.    JUWAN BROOKS M.D., ATTENDING RADIOLOGIST  This document has been electronically signed. Mar  3 2018  1:54PM

## 2023-10-05 NOTE — ASU DISCHARGE PLAN (ADULT/PEDIATRIC) - CARE PROVIDER_API CALL
Sameer Thurman  Colon/Rectal Surgery  [address] [place] [address], [address]  [place] [address]  Phone: [phone]  Fax: [phone]  Follow Up Time: 2 weeks

## 2023-10-05 NOTE — BRIEF OPERATIVE NOTE - NSICDXBRIEFPREOP_GEN_ALL_CORE_FT
PRE-OP DIAGNOSIS:  Umbilical hernia 05-Oct-2023 12:46:23  Shawna Guerrero  Right inguinal hernia 05-Oct-2023 12:46:42  Shawna Guerrero

## 2023-10-05 NOTE — BRIEF OPERATIVE NOTE - NSICDXBRIEFPROCEDURE_GEN_ALL_CORE_FT
PROCEDURES:  Repair, hernia, umbilical, with lipectomy 05-Oct-2023 12:46:06  Shawna Guerrero  Open repair of inguinal hernia using mesh in adult 05-Oct-2023 12:46:15  Shawna Guerrero

## 2023-10-11 ENCOUNTER — RX RENEWAL (OUTPATIENT)
Age: 68
End: 2023-10-11

## 2023-10-11 PROBLEM — K42.9 UMBILICAL HERNIA WITHOUT OBSTRUCTION OR GANGRENE: Chronic | Status: ACTIVE | Noted: 2023-09-15

## 2023-10-16 LAB — SURGICAL PATHOLOGY STUDY: SIGNIFICANT CHANGE UP

## 2023-10-16 RX ORDER — DAPAGLIFLOZIN 10 MG/1
10 TABLET, FILM COATED ORAL
Qty: 270 | Refills: 1 | Status: ACTIVE | COMMUNITY
Start: 2022-06-08 | End: 1900-01-01

## 2023-10-20 ENCOUNTER — APPOINTMENT (OUTPATIENT)
Dept: SURGERY | Facility: CLINIC | Age: 68
End: 2023-10-20
Payer: MEDICARE

## 2023-10-20 VITALS
TEMPERATURE: 96.9 F | WEIGHT: 176 LBS | HEART RATE: 83 BPM | OXYGEN SATURATION: 98 % | SYSTOLIC BLOOD PRESSURE: 131 MMHG | BODY MASS INDEX: 28.28 KG/M2 | HEIGHT: 66 IN | DIASTOLIC BLOOD PRESSURE: 83 MMHG | RESPIRATION RATE: 18 BRPM

## 2023-10-20 DIAGNOSIS — Z09 ENCOUNTER FOR FOLLOW-UP EXAMINATION AFTER COMPLETED TREATMENT FOR CONDITIONS OTHER THAN MALIGNANT NEOPLASM: ICD-10-CM

## 2023-10-20 PROCEDURE — 99024 POSTOP FOLLOW-UP VISIT: CPT

## 2023-10-27 ENCOUNTER — APPOINTMENT (OUTPATIENT)
Dept: ORTHOPEDIC SURGERY | Facility: CLINIC | Age: 68
End: 2023-10-27
Payer: MEDICARE

## 2023-10-27 DIAGNOSIS — S61.211A LACERATION W/OUT FOREIGN BODY OF LEFT INDEX FINGER W/OUT DAMAGE TO NAIL, INITIAL ENCOUNTER: ICD-10-CM

## 2023-10-27 PROCEDURE — 99213 OFFICE O/P EST LOW 20 MIN: CPT

## 2023-11-03 ENCOUNTER — APPOINTMENT (OUTPATIENT)
Dept: ELECTROPHYSIOLOGY | Facility: CLINIC | Age: 68
End: 2023-11-03
Payer: MEDICARE

## 2023-11-03 ENCOUNTER — NON-APPOINTMENT (OUTPATIENT)
Age: 68
End: 2023-11-03

## 2023-11-03 PROCEDURE — 93295 DEV INTERROG REMOTE 1/2/MLT: CPT

## 2023-11-03 PROCEDURE — 93296 REM INTERROG EVL PM/IDS: CPT

## 2023-12-10 ENCOUNTER — RX RENEWAL (OUTPATIENT)
Age: 68
End: 2023-12-10

## 2023-12-18 RX ORDER — SACUBITRIL AND VALSARTAN 97; 103 MG/1; MG/1
97-103 TABLET, FILM COATED ORAL
Qty: 180 | Refills: 2 | Status: ACTIVE | COMMUNITY
Start: 2022-05-26 | End: 1900-01-01

## 2024-01-02 ENCOUNTER — RX RENEWAL (OUTPATIENT)
Age: 69
End: 2024-01-02

## 2024-01-23 NOTE — ED ADULT NURSE NOTE - NSFALLRSKASSESSDT_ED_ALL_ED
Problem: NORMAL   Goal: Experiences normal transition  Description: INTERVENTIONS:  - Assess and monitor vital signs and lab values.  - Encourage skin-to-skin with caregiver for thermoregulation  - Assess signs, symptoms and risk factors for hypoglycemia and follow protocol as needed.  - Assess signs, symptoms and risk factors for jaundice risk and follow protocol as needed.  - Utilize standard precautions and use personal protective equipment as indicated. Wash hands properly before and after each patient care activity.   - Ensure proper skin care and diapering and educate caregiver.  - Follow proper infant identification and infant security measures (secure access to the unit, provider ID, visiting policy, Aviate and Kisses system), and educate caregiver.  - Ensure proper circumcision care and instruct/demonstrate to caregiver.  Outcome: Progressing  Goal: Total weight loss less than 10% of birth weight  Description: INTERVENTIONS:  - Initiate breastfeeding within first hour after birth.   - Encourage rooming-in.  - Assess infant feedings.  - Monitor intake and output and daily weight.  - Encourage maternal fluid intake for breastfeeding mother.  - Encourage feeding on-demand or as ordered per pediatrician.  - Educate caregiver on proper bottle-feeding technique as needed.  - Provide information about early infant feeding cues (e.g., rooting, lip smacking, sucking fingers/hand) versus late cue of crying.  - Review techniques for breastfeeding moms for expression (breast pumping) and storage of breast milk.  Outcome: Progressing      26-Jul-2019 21:14

## 2024-02-05 ENCOUNTER — NON-APPOINTMENT (OUTPATIENT)
Age: 69
End: 2024-02-05

## 2024-02-05 ENCOUNTER — APPOINTMENT (OUTPATIENT)
Dept: ELECTROPHYSIOLOGY | Facility: CLINIC | Age: 69
End: 2024-02-05
Payer: MEDICARE

## 2024-02-05 PROCEDURE — 93296 REM INTERROG EVL PM/IDS: CPT

## 2024-02-05 PROCEDURE — 93295 DEV INTERROG REMOTE 1/2/MLT: CPT

## 2024-02-08 NOTE — H&P PST ADULT - HEART RATE (BEATS/MIN)

## 2024-02-14 ENCOUNTER — LABORATORY RESULT (OUTPATIENT)
Age: 69
End: 2024-02-14

## 2024-02-14 ENCOUNTER — APPOINTMENT (OUTPATIENT)
Dept: CARDIOLOGY | Facility: CLINIC | Age: 69
End: 2024-02-14
Payer: MEDICARE

## 2024-02-14 VITALS
DIASTOLIC BLOOD PRESSURE: 80 MMHG | BODY MASS INDEX: 28.45 KG/M2 | HEIGHT: 66 IN | SYSTOLIC BLOOD PRESSURE: 130 MMHG | HEART RATE: 72 BPM | OXYGEN SATURATION: 97 % | TEMPERATURE: 98.4 F | WEIGHT: 177 LBS

## 2024-02-14 DIAGNOSIS — Z13.228 ENCOUNTER FOR SCREENING FOR OTHER METABOLIC DISORDERS: ICD-10-CM

## 2024-02-14 DIAGNOSIS — Z95.810 PRESENCE OF AUTOMATIC (IMPLANTABLE) CARDIAC DEFIBRILLATOR: ICD-10-CM

## 2024-02-14 DIAGNOSIS — E27.8 OTHER SPECIFIED DISORDERS OF ADRENAL GLAND: ICD-10-CM

## 2024-02-14 DIAGNOSIS — E11.9 TYPE 2 DIABETES MELLITUS W/OUT COMPLICATIONS: ICD-10-CM

## 2024-02-14 DIAGNOSIS — I77.810 THORACIC AORTIC ECTASIA: ICD-10-CM

## 2024-02-14 DIAGNOSIS — I42.9 CARDIOMYOPATHY, UNSPECIFIED: ICD-10-CM

## 2024-02-14 DIAGNOSIS — I25.10 ATHEROSCLEROTIC HEART DISEASE OF NATIVE CORONARY ARTERY W/OUT ANGINA PECTORIS: ICD-10-CM

## 2024-02-14 DIAGNOSIS — E78.5 HYPERLIPIDEMIA, UNSPECIFIED: ICD-10-CM

## 2024-02-14 DIAGNOSIS — R79.89 OTHER SPECIFIED ABNORMAL FINDINGS OF BLOOD CHEMISTRY: ICD-10-CM

## 2024-02-14 DIAGNOSIS — R91.1 SOLITARY PULMONARY NODULE: ICD-10-CM

## 2024-02-14 DIAGNOSIS — I10 ESSENTIAL (PRIMARY) HYPERTENSION: ICD-10-CM

## 2024-02-14 PROCEDURE — ZZZZZ: CPT

## 2024-02-14 PROCEDURE — 99214 OFFICE O/P EST MOD 30 MIN: CPT

## 2024-02-14 PROCEDURE — 93000 ELECTROCARDIOGRAM COMPLETE: CPT

## 2024-02-14 PROCEDURE — G2211 COMPLEX E/M VISIT ADD ON: CPT

## 2024-02-14 NOTE — PHYSICAL EXAM
There are no preventive care reminders to display for this patient.    Patient is up to date, no discussion needed.   [Well Developed] : well developed [Well Nourished] : well nourished [No Acute Distress] : no acute distress [Normal Conjunctiva] : normal conjunctiva [Normal Venous Pressure] : normal venous pressure [No Carotid Bruit] : no carotid bruit [Normal S1, S2] : normal S1, S2 [No Murmur] : no murmur [No Rub] : no rub [No Gallop] : no gallop [Clear Lung Fields] : clear lung fields [Good Air Entry] : good air entry [No Respiratory Distress] : no respiratory distress  [Soft] : abdomen soft [Non Tender] : non-tender [No Masses/organomegaly] : no masses/organomegaly [Normal Bowel Sounds] : normal bowel sounds [Normal Gait] : normal gait [No Edema] : no edema [No Cyanosis] : no cyanosis [No Clubbing] : no clubbing [No Varicosities] : no varicosities [No Rash] : no rash [No Skin Lesions] : no skin lesions [Moves all extremities] : moves all extremities [No Focal Deficits] : no focal deficits [Normal Speech] : normal speech [Alert and Oriented] : alert and oriented [Normal memory] : normal memory [de-identified] : site of umbilical hernia intact

## 2024-02-14 NOTE — HISTORY OF PRESENT ILLNESS
[FreeTextEntry1] : This is a 68 y/o male with a pmhx of DM HTN HLD CM +AICD, systolic heart failure here today for a follow up. He is s/p hernia repair with Dr. Thurman 10/5/23. pt reports feeling well Denies any complaints denies any CP SOB dizziness n/v/d fever chills or abdominal pain
General

## 2024-02-16 ENCOUNTER — NON-APPOINTMENT (OUTPATIENT)
Age: 69
End: 2024-02-16

## 2024-02-16 DIAGNOSIS — E11.9 TYPE 2 DIABETES MELLITUS W/OUT COMPLICATIONS: ICD-10-CM

## 2024-02-16 LAB
ALBUMIN SERPL ELPH-MCNC: 4.4 G/DL
ALP BLD-CCNC: 52 U/L
ALT SERPL-CCNC: 15 U/L
ANION GAP SERPL CALC-SCNC: 16 MMOL/L
APPEARANCE: CLEAR
AST SERPL-CCNC: 20 U/L
BACTERIA: NEGATIVE /HPF
BASOPHILS # BLD AUTO: 0.01 K/UL
BASOPHILS NFR BLD AUTO: 0.3 %
BILIRUB DIRECT SERPL-MCNC: 0.2 MG/DL
BILIRUB INDIRECT SERPL-MCNC: 0.5 MG/DL
BILIRUB SERPL-MCNC: 0.6 MG/DL
BILIRUBIN URINE: NEGATIVE
BLOOD URINE: NEGATIVE
BUN SERPL-MCNC: 19 MG/DL
CALCIUM SERPL-MCNC: 9.8 MG/DL
CAST: 0 /LPF
CHLORIDE SERPL-SCNC: 103 MMOL/L
CHOLEST SERPL-MCNC: 186 MG/DL
CO2 SERPL-SCNC: 20 MMOL/L
COLOR: YELLOW
CREAT SERPL-MCNC: 1.07 MG/DL
EGFR: 75 ML/MIN/1.73M2
EOSINOPHIL # BLD AUTO: 0.07 K/UL
EOSINOPHIL NFR BLD AUTO: 2.3 %
EPITHELIAL CELLS: 1 /HPF
ESTIMATED AVERAGE GLUCOSE: 177 MG/DL
GLUCOSE QUALITATIVE U: >=1000 MG/DL
GLUCOSE SERPL-MCNC: 156 MG/DL
HBA1C MFR BLD HPLC: 7.8 %
HCT VFR BLD CALC: 49.2 %
HDLC SERPL-MCNC: 67 MG/DL
HGB BLD-MCNC: 17.2 G/DL
IMM GRANULOCYTES NFR BLD AUTO: 0.3 %
KETONES URINE: NEGATIVE MG/DL
LDLC SERPL CALC-MCNC: 98 MG/DL
LEUKOCYTE ESTERASE URINE: NEGATIVE
LYMPHOCYTES # BLD AUTO: 1.16 K/UL
LYMPHOCYTES NFR BLD AUTO: 37.5 %
MAGNESIUM SERPL-MCNC: 2.2 MG/DL
MAN DIFF?: NORMAL
MCHC RBC-ENTMCNC: 31.3 PG
MCHC RBC-ENTMCNC: 35 GM/DL
MCV RBC AUTO: 89.6 FL
MICROSCOPIC-UA: NORMAL
MONOCYTES # BLD AUTO: 0.53 K/UL
MONOCYTES NFR BLD AUTO: 17.2 %
NEUTROPHILS # BLD AUTO: 1.31 K/UL
NEUTROPHILS NFR BLD AUTO: 42.4 %
NITRITE URINE: NEGATIVE
NONHDLC SERPL-MCNC: 119 MG/DL
NT-PROBNP SERPL-MCNC: 41 PG/ML
PH URINE: 6
PLATELET # BLD AUTO: 169 K/UL
POTASSIUM SERPL-SCNC: 4.4 MMOL/L
PROT SERPL-MCNC: 7.1 G/DL
PROTEIN URINE: NEGATIVE MG/DL
RBC # BLD: 5.49 M/UL
RBC # FLD: 14.7 %
RED BLOOD CELLS URINE: 0 /HPF
SODIUM SERPL-SCNC: 139 MMOL/L
SPECIFIC GRAVITY URINE: >1.03
T4 FREE SERPL-MCNC: 1.3 NG/DL
TRIGL SERPL-MCNC: 122 MG/DL
TSH SERPL-ACNC: 2.07 UIU/ML
UROBILINOGEN URINE: 0.2 MG/DL
WBC # FLD AUTO: 3.09 K/UL
WHITE BLOOD CELLS URINE: 2 /HPF

## 2024-02-20 ENCOUNTER — RX CHANGE (OUTPATIENT)
Age: 69
End: 2024-02-20

## 2024-02-21 ENCOUNTER — RX CHANGE (OUTPATIENT)
Age: 69
End: 2024-02-21

## 2024-02-21 RX ORDER — EVOLOCUMAB 140 MG/ML
140 INJECTION, SOLUTION SUBCUTANEOUS
Qty: 6 | Refills: 2 | Status: ACTIVE | COMMUNITY
Start: 2023-01-27 | End: 1900-01-01

## 2024-02-23 ENCOUNTER — APPOINTMENT (OUTPATIENT)
Dept: CT IMAGING | Facility: CLINIC | Age: 69
End: 2024-02-23
Payer: MEDICARE

## 2024-02-23 ENCOUNTER — OUTPATIENT (OUTPATIENT)
Dept: OUTPATIENT SERVICES | Facility: HOSPITAL | Age: 69
LOS: 1 days | End: 2024-02-23
Payer: MEDICARE

## 2024-02-23 DIAGNOSIS — I77.810 THORACIC AORTIC ECTASIA: ICD-10-CM

## 2024-02-23 DIAGNOSIS — Z95.810 PRESENCE OF AUTOMATIC (IMPLANTABLE) CARDIAC DEFIBRILLATOR: Chronic | ICD-10-CM

## 2024-02-23 DIAGNOSIS — Z98.89 OTHER SPECIFIED POSTPROCEDURAL STATES: Chronic | ICD-10-CM

## 2024-02-23 DIAGNOSIS — Z98.890 OTHER SPECIFIED POSTPROCEDURAL STATES: Chronic | ICD-10-CM

## 2024-02-23 DIAGNOSIS — S55.001A UNSPECIFIED INJURY OF ULNAR ARTERY AT FOREARM LEVEL, RIGHT ARM, INITIAL ENCOUNTER: Chronic | ICD-10-CM

## 2024-02-23 DIAGNOSIS — Z00.8 ENCOUNTER FOR OTHER GENERAL EXAMINATION: ICD-10-CM

## 2024-02-23 PROCEDURE — 71250 CT THORAX DX C-: CPT

## 2024-02-23 PROCEDURE — 71250 CT THORAX DX C-: CPT | Mod: 26

## 2024-03-02 ENCOUNTER — RX RENEWAL (OUTPATIENT)
Age: 69
End: 2024-03-02

## 2024-03-02 RX ORDER — CARVEDILOL 25 MG/1
25 TABLET, FILM COATED ORAL
Qty: 180 | Refills: 1 | Status: ACTIVE | COMMUNITY
Start: 2019-08-08 | End: 1900-01-01

## 2024-03-22 NOTE — PRE-ANESTHESIA EVALUATION ADULT - BP NONINVASIVE SYSTOLIC (MM HG)
131 [Yes] : Patient goes to dentist yearly [Has family members/adults to turn to for help] : has family members/adults to turn to for help [Grade: ____] : Grade: [unfilled] [Normal Performance] : normal performance [Normal Behavior/Attention] : normal behavior/attention [Eats regular meals including adequate fruits and vegetables] : eats regular meals including adequate fruits and vegetables [Has friends] : has friends [Uses safety belts/safety equipment] : uses safety belts/safety equipment  [Has ways to cope with stress] : has ways to cope with stress [No] : Patient has not had sexual intercourse [Displays self-confidence] : displays self-confidence [With Teen] : teen [Uses electronic nicotine delivery system] : does not use electronic nicotine delivery system [Uses tobacco] : does not use tobacco [Uses drugs] : does not use drugs  [Drinks alcohol] : does not drink alcohol [Gets depressed, anxious, or irritable/has mood swings] : does not get depressed, anxious, or irritable/has mood swings [Has problems with sleep] : does not have problems with sleep [Has thought about hurting self or considered suicide] : has not thought about hurting self or considered suicide [de-identified] : no  [de-identified] : went to dentist in November; brushes teeth bid  [de-identified] : lives with mother and brother- gets along well  [FreeTextEntry1] : 14 year old male presenting for CPE for participation in recreation center sports.  No new medical problems.  No surgeries/operations, no hospitalizations, no serious illnesses, no concussions or fractures.  No cardiac history.  No hx of asthma.  No hx of kidney problems.  No hx of seizures.  Has always been cleared to play sports before.  Feels good playing sports.  Able to keep up with other players.  Denies hx of syncope with exercise.  No chest pain or dyspnea with exercise.  No palpitations.  No family hx of early cardiac disease or unexplained sudden death.  HCM: Last dental visit was in november.  Does not wear glasses or contacts.  c/o productive cough and stuffy nose x few days no fever, sob, chest pain or fatigue no known sick contacts  didnt take medicine today

## 2024-04-16 RX ORDER — LIDOCAINE 5% 700 MG/1
5 PATCH TOPICAL
Qty: 30 | Refills: 2 | Status: DISCONTINUED | COMMUNITY
Start: 2023-11-08 | End: 2024-04-16

## 2024-04-16 RX ORDER — GABAPENTIN 300 MG/1
300 CAPSULE ORAL
Qty: 30 | Refills: 2 | Status: DISCONTINUED | COMMUNITY
Start: 2022-10-31 | End: 2024-04-16

## 2024-04-16 RX ORDER — DICLOFENAC SODIUM 3 G/100G
3 GEL TOPICAL TWICE DAILY
Qty: 1 | Refills: 0 | Status: DISCONTINUED | COMMUNITY
Start: 2021-03-22 | End: 2024-04-16

## 2024-04-16 RX ORDER — METFORMIN HYDROCHLORIDE 500 MG/1
500 TABLET, COATED ORAL
Qty: 135 | Refills: 0 | Status: DISCONTINUED | COMMUNITY
Start: 1900-01-01 | End: 2024-04-16

## 2024-04-17 ENCOUNTER — APPOINTMENT (OUTPATIENT)
Dept: HEART FAILURE | Facility: CLINIC | Age: 69
End: 2024-04-17

## 2024-04-17 VITALS
HEIGHT: 66 IN | DIASTOLIC BLOOD PRESSURE: 82 MMHG | WEIGHT: 178 LBS | TEMPERATURE: 97.4 F | HEART RATE: 70 BPM | BODY MASS INDEX: 28.61 KG/M2 | OXYGEN SATURATION: 99 % | SYSTOLIC BLOOD PRESSURE: 142 MMHG

## 2024-04-17 PROCEDURE — 99214 OFFICE O/P EST MOD 30 MIN: CPT

## 2024-04-17 RX ORDER — SPIRONOLACTONE 25 MG/1
25 TABLET ORAL DAILY
Qty: 30 | Refills: 5 | Status: ACTIVE | COMMUNITY
Start: 2022-08-02 | End: 1900-01-01

## 2024-04-17 NOTE — PHYSICAL EXAM
[No Xanthelasma] : no xanthelasma [Normal] : no edema, no cyanosis, no clubbing, no varicosities [No Rash] : no rash [Moves all extremities] : moves all extremities [Alert and Oriented] : alert and oriented [de-identified] : JVP 6-8 cm of H2O, no HJR [de-identified] : warm peripherally

## 2024-04-17 NOTE — REVIEW OF SYSTEMS
[Negative] : Heme/Lymph [FreeTextEntry1] : A complete review of systems was obtained and is negative except as stated in HPI (systems reviewed: Const, Eyes, ENT, Resp, CV, GI, , MSK, Skin, Neuro, Pysch, Endo, Hemato/Lymph and Allergy/Immuno).

## 2024-04-17 NOTE — HISTORY OF PRESENT ILLNESS
[FreeTextEntry1] : Mr. Camara is a 69 year old man with a PMH chronic systolic HF ACC/AHA stage C, NICM (LVEF improved to 40-45% with GDMT, LVIDd 5.4 cm) s/p CRT-D (with LV epicardial lead)  in 7/2019, covid infection '20, HLD, HTN, DM (A1c 7.2%), lung Nodules, Adrenal Nodule, proteinuria, VitamiN D Deficiency, and Neutropenia, who presents for routine follow-up of his cardiomyopathy   He has followed with Dr. Grady for about 20 years when he was 1st told he had a LBBB. In 2009 was on vacation and felt generalized fatigued and ?arrhythmia. At that time had reduced LVEF of 20% so  and underwent Biv ICD  implant. He was started on BB and baby ASA. He has had longstanding follow up and management of his HF with Dr. Grady. It seems he was on Benicar but at some point it was discontinued due to lightheadedness.  He's had serial  TTEs in [employer] which revealed LVEF ~40-45%. He also underwent nuclear stress testing in 2019 and prelim report indicates mildly dilated LV with normal perfusion. Pt denies any recent HF hospitalizations or ED visits. His physical activity is limited by sciatica pain. No h/o ICD shocks.   Since last seen in March 2023, doing well and reports feeling excellent. He has not visited the ED or been hospitalized for HF. He denies LH/dizziness, CP, palpitations, orthopnea, PND, ABD discomfort and LE swelling. He also had hernia repair surgery in the past year and doing well from that perspective.

## 2024-04-17 NOTE — CARDIOLOGY SUMMARY
[de-identified] : \par  5/27/22 SR. V-paced\par   [de-identified] : \par  12/2/22 TTE: LVIDd 5.4 cm, LVEF 40-45%, mild LVH (septum 1.44, PW 1.18), Grade 1 DD, normal RV size and function, mild LAE, normal RA, trace MR, trivial TR, mild AI, est RVSP 22.7 mmHg , IVC normal size with respiratory size variation greater than 50%\par  \par  5/2022 TTE: LVEF 38% LVIDd 4.98cm, Grade I DD, mild AI, RV nl size/function, RVSP 27mmHg, dilated Asc Ao 3.9cm\par  \par  4/6/21 TTE: LVIDd 5.3, LVEF 41%, increased LV wall thickness, mild global LV hypokenesis, grade I diastolic dysfunction, normal RVSF, mild LA enlargement, Mild MR, mild AR,  mild TR,  [de-identified] : \par  5/27/22 Device:  no arrhythmia, no therapies. AP <1%, BiV pacing 99%. Bi-V optimization done ot the best as LV epicardial lead is not in optimal position. \par   [de-identified] : \par  [facility] 5/6/11: Normal Coronaries\par

## 2024-04-17 NOTE — DISCUSSION/SUMMARY
[FreeTextEntry1] : 70 y/o man with ACC/AHA stage C, NICM (LVEF improved to 40-45% with GDMT, LVIDd 5.4 cm) s/p CRT-D (with LV epicardial lead) in 7/2019, covid infection '20, HLD, HTN, DM, lung Nodules, Adrenal Nodule, proteinuria, VitamiN D Deficiency, and Neutropenia, who has done well tolerating excellent dosages of neurohormonal blockade with improvement in cardiac function. He is euvolemic and well compensated, endorsing NYHA Class I.

## 2024-04-17 NOTE — ASSESSMENT
[FreeTextEntry1] : # Chronic systolic HF ACC/AHA stage C, NIYHA I-II symptoms - In setting of NICMP, LVEF improved to 40-45% from prior ~20% in 2009 with medical therapies - GDMT: Continue Farxiga 10mg daily, Coreg 25mg BID, Entresto  mg BID and Spironolactone 25 mg QD - Diuretics: euvolemic off loop diuretics - Device: s/p CRT-D (LV epicardial lead, not in ideal position) Bi-v 99%. No shocks.  - Does not qualify for cardiac rehab given improved LVEF - Labs: 2/2024 proBNP 40, hgb 17, Cr 1.07/BUN 19, K 4.4 - plan for repeat TTE in 7/2024  # HTN - well controlled with above GDMT  # Nonobstructive CAD - no signs of ischemia - ASA/Livalo  # DMT2 - A1c 7.4 - on Metformin and SGLT2-i   Follow-up with Dr. Kay yearly

## 2024-05-05 LAB
ALBUMIN SERPL ELPH-MCNC: 4.5 G/DL
ALBUMIN SERPL ELPH-MCNC: 4.7 G/DL
ALP BLD-CCNC: 44 U/L
ALP BLD-CCNC: 62 U/L
ALT SERPL-CCNC: 13 U/L
ALT SERPL-CCNC: 9 U/L
ANION GAP SERPL CALC-SCNC: 13 MMOL/L
ANION GAP SERPL CALC-SCNC: 14 MMOL/L
ANION GAP SERPL CALC-SCNC: 17 MMOL/L
APPEARANCE: CLEAR
APPEARANCE: CLEAR
AST SERPL-CCNC: 16 U/L
AST SERPL-CCNC: 19 U/L
BACTERIA: NEGATIVE
BACTERIA: NEGATIVE /HPF
BASOPHILS # BLD AUTO: 0.02 K/UL
BASOPHILS # BLD AUTO: 0.02 K/UL
BASOPHILS NFR BLD AUTO: 0.6 %
BASOPHILS NFR BLD AUTO: 0.7 %
BILIRUB DIRECT SERPL-MCNC: 0.1 MG/DL
BILIRUB DIRECT SERPL-MCNC: 0.1 MG/DL
BILIRUB INDIRECT SERPL-MCNC: 0.4 MG/DL
BILIRUB INDIRECT SERPL-MCNC: 0.4 MG/DL
BILIRUB SERPL-MCNC: 0.5 MG/DL
BILIRUB SERPL-MCNC: 0.6 MG/DL
BILIRUBIN URINE: NEGATIVE
BILIRUBIN URINE: NEGATIVE
BLOOD URINE: NEGATIVE
BLOOD URINE: NEGATIVE
BUN SERPL-MCNC: 16 MG/DL
BUN SERPL-MCNC: 17 MG/DL
BUN SERPL-MCNC: 21 MG/DL
CALCIUM SERPL-MCNC: 10.1 MG/DL
CAST: 0 /LPF
CHLORIDE SERPL-SCNC: 103 MMOL/L
CHLORIDE SERPL-SCNC: 104 MMOL/L
CHLORIDE SERPL-SCNC: 99 MMOL/L
CHOLEST SERPL-MCNC: 181 MG/DL
CHOLEST SERPL-MCNC: 186 MG/DL
CHOLEST SERPL-MCNC: 203 MG/DL
CHOLEST SERPL-MCNC: 292 MG/DL
CO2 SERPL-SCNC: 19 MMOL/L
CO2 SERPL-SCNC: 21 MMOL/L
CO2 SERPL-SCNC: 25 MMOL/L
COLOR: COLORLESS
COLOR: YELLOW
CREAT SERPL-MCNC: 0.98 MG/DL
CREAT SERPL-MCNC: 0.99 MG/DL
CREAT SERPL-MCNC: 1.55 MG/DL
EGFR: 49 ML/MIN/1.73M2
EGFR: 83 ML/MIN/1.73M2
EGFR: 85 ML/MIN/1.73M2
EOSINOPHIL # BLD AUTO: 0.08 K/UL
EOSINOPHIL # BLD AUTO: 0.1 K/UL
EOSINOPHIL NFR BLD AUTO: 2.8 %
EOSINOPHIL NFR BLD AUTO: 3.2 %
EPITHELIAL CELLS: 0 /HPF
ESTIMATED AVERAGE GLUCOSE: 154 MG/DL
ESTIMATED AVERAGE GLUCOSE: 160 MG/DL
GLUCOSE QUALITATIVE U: >=1000 MG/DL
GLUCOSE QUALITATIVE U: ABNORMAL
GLUCOSE SERPL-MCNC: 111 MG/DL
GLUCOSE SERPL-MCNC: 127 MG/DL
GLUCOSE SERPL-MCNC: 162 MG/DL
HBA1C MFR BLD HPLC: 7 %
HBA1C MFR BLD HPLC: 7.2 %
HCT VFR BLD CALC: 46.4 %
HCT VFR BLD CALC: 50.6 %
HCT VFR BLD CALC: 51.7 %
HDLC SERPL-MCNC: 49 MG/DL
HDLC SERPL-MCNC: 54 MG/DL
HDLC SERPL-MCNC: 72 MG/DL
HDLC SERPL-MCNC: 73 MG/DL
HGB BLD-MCNC: 15 G/DL
HGB BLD-MCNC: 17.3 G/DL
HGB BLD-MCNC: 17.3 G/DL
HYALINE CASTS: 0 /LPF
IMM GRANULOCYTES NFR BLD AUTO: 0 %
IMM GRANULOCYTES NFR BLD AUTO: 0.3 %
KETONES URINE: NEGATIVE
KETONES URINE: NEGATIVE MG/DL
LDLC SERPL CALC-MCNC: 118 MG/DL
LDLC SERPL CALC-MCNC: 121 MG/DL
LDLC SERPL CALC-MCNC: 196 MG/DL
LDLC SERPL CALC-MCNC: 99 MG/DL
LEUKOCYTE ESTERASE URINE: NEGATIVE
LEUKOCYTE ESTERASE URINE: NEGATIVE
LYMPHOCYTES # BLD AUTO: 1 K/UL
LYMPHOCYTES # BLD AUTO: 1.18 K/UL
LYMPHOCYTES NFR BLD AUTO: 34.8 %
LYMPHOCYTES NFR BLD AUTO: 37.7 %
MAGNESIUM SERPL-MCNC: 2.1 MG/DL
MAGNESIUM SERPL-MCNC: 2.1 MG/DL
MAN DIFF?: NORMAL
MAN DIFF?: NORMAL
MCHC RBC-ENTMCNC: 29.1 PG
MCHC RBC-ENTMCNC: 30.3 PG
MCHC RBC-ENTMCNC: 30.6 PG
MCHC RBC-ENTMCNC: 32.3 GM/DL
MCHC RBC-ENTMCNC: 33.5 GM/DL
MCHC RBC-ENTMCNC: 34.2 GM/DL
MCV RBC AUTO: 89.4 FL
MCV RBC AUTO: 90.1 FL
MCV RBC AUTO: 90.5 FL
MICROSCOPIC-UA: NORMAL
MICROSCOPIC-UA: NORMAL
MONOCYTES # BLD AUTO: 0.48 K/UL
MONOCYTES # BLD AUTO: 0.52 K/UL
MONOCYTES NFR BLD AUTO: 16.6 %
MONOCYTES NFR BLD AUTO: 16.7 %
NEUTROPHILS # BLD AUTO: 1.29 K/UL
NEUTROPHILS # BLD AUTO: 1.3 K/UL
NEUTROPHILS NFR BLD AUTO: 41.6 %
NEUTROPHILS NFR BLD AUTO: 45 %
NITRITE URINE: NEGATIVE
NITRITE URINE: NEGATIVE
NONHDLC SERPL-MCNC: 113 MG/DL
NONHDLC SERPL-MCNC: 132 MG/DL
NONHDLC SERPL-MCNC: 148 MG/DL
NONHDLC SERPL-MCNC: 219 MG/DL
NT-PROBNP SERPL-MCNC: 30 PG/ML
NT-PROBNP SERPL-MCNC: 48 PG/ML
NT-PROBNP SERPL-MCNC: 67 PG/ML
PH URINE: 6
PH URINE: 6.5
PLATELET # BLD AUTO: 176 K/UL
PLATELET # BLD AUTO: 182 K/UL
PLATELET # BLD AUTO: 217 K/UL
POTASSIUM SERPL-SCNC: 4.1 MMOL/L
POTASSIUM SERPL-SCNC: 4.4 MMOL/L
POTASSIUM SERPL-SCNC: 4.7 MMOL/L
PROT SERPL-MCNC: 7.4 G/DL
PROT SERPL-MCNC: 7.6 G/DL
PROTEIN URINE: NEGATIVE
PROTEIN URINE: NEGATIVE MG/DL
RBC # BLD: 5.15 M/UL
RBC # BLD: 5.66 M/UL
RBC # BLD: 5.71 M/UL
RBC # FLD: 15.7 %
RBC # FLD: 16 %
RBC # FLD: 16.6 %
RED BLOOD CELLS URINE: 0 /HPF
RED BLOOD CELLS URINE: 0 /HPF
SODIUM SERPL-SCNC: 137 MMOL/L
SODIUM SERPL-SCNC: 138 MMOL/L
SODIUM SERPL-SCNC: 139 MMOL/L
SPECIFIC GRAVITY URINE: 1.01
SPECIFIC GRAVITY URINE: 1.03
SQUAMOUS EPITHELIAL CELLS: 1 /HPF
T4 FREE SERPL-MCNC: 1.1 NG/DL
T4 FREE SERPL-MCNC: 1.3 NG/DL
TRIGL SERPL-MCNC: 116 MG/DL
TRIGL SERPL-MCNC: 134 MG/DL
TRIGL SERPL-MCNC: 70 MG/DL
TRIGL SERPL-MCNC: 77 MG/DL
TSH SERPL-ACNC: 1.67 UIU/ML
TSH SERPL-ACNC: 1.85 UIU/ML
UROBILINOGEN URINE: 0.2 MG/DL
UROBILINOGEN URINE: NORMAL
WBC # FLD AUTO: 2.87 K/UL
WBC # FLD AUTO: 3.1 K/UL
WBC # FLD AUTO: 3.13 K/UL
WHITE BLOOD CELLS URINE: 0 /HPF
WHITE BLOOD CELLS URINE: 1 /HPF

## 2024-05-06 ENCOUNTER — APPOINTMENT (OUTPATIENT)
Dept: ELECTROPHYSIOLOGY | Facility: CLINIC | Age: 69
End: 2024-05-06
Payer: MEDICARE

## 2024-05-06 ENCOUNTER — NON-APPOINTMENT (OUTPATIENT)
Age: 69
End: 2024-05-06

## 2024-05-06 PROCEDURE — 93295 DEV INTERROG REMOTE 1/2/MLT: CPT

## 2024-05-06 PROCEDURE — 93296 REM INTERROG EVL PM/IDS: CPT

## 2024-05-10 ENCOUNTER — APPOINTMENT (OUTPATIENT)
Dept: UROLOGY | Facility: CLINIC | Age: 69
End: 2024-05-10

## 2024-05-11 ENCOUNTER — RX RENEWAL (OUTPATIENT)
Age: 69
End: 2024-05-11

## 2024-05-31 ENCOUNTER — APPOINTMENT (OUTPATIENT)
Dept: UROLOGY | Facility: CLINIC | Age: 69
End: 2024-05-31
Payer: MEDICARE

## 2024-05-31 VITALS
BODY MASS INDEX: 28.61 KG/M2 | SYSTOLIC BLOOD PRESSURE: 135 MMHG | OXYGEN SATURATION: 96 % | RESPIRATION RATE: 17 BRPM | HEIGHT: 66 IN | DIASTOLIC BLOOD PRESSURE: 78 MMHG | WEIGHT: 178 LBS | HEART RATE: 77 BPM

## 2024-05-31 DIAGNOSIS — N40.1 BENIGN PROSTATIC HYPERPLASIA WITH LOWER URINARY TRACT SYMPMS: ICD-10-CM

## 2024-05-31 DIAGNOSIS — N13.8 BENIGN PROSTATIC HYPERPLASIA WITH LOWER URINARY TRACT SYMPMS: ICD-10-CM

## 2024-05-31 PROCEDURE — 99203 OFFICE O/P NEW LOW 30 MIN: CPT | Mod: 25

## 2024-05-31 PROCEDURE — 51798 US URINE CAPACITY MEASURE: CPT

## 2024-05-31 RX ORDER — TADALAFIL 20 MG/1
20 TABLET ORAL
Qty: 18 | Refills: 6 | Status: ACTIVE | COMMUNITY
Start: 2024-05-31 | End: 1900-01-01

## 2024-05-31 NOTE — HISTORY OF PRESENT ILLNESS
[FreeTextEntry1] : transferring care from outside urologist reviewed his notes - has fluctuating PSA for several years from mid 2"0s to highest 4.2 7/22 last PSA 11/23 3.56. no PNB or imaging.  he has some LUts of  slower FOS, occasional pushing and some urgency.  no h/o UTIs, hematuria or retention. PVR 0. uses Tadalafil 20mg for ED - works Ok

## 2024-06-01 LAB
APPEARANCE: CLEAR
BACTERIA: NEGATIVE /HPF
BILIRUBIN URINE: NEGATIVE
BLOOD URINE: NEGATIVE
CAST: 0 /LPF
COLOR: YELLOW
EPITHELIAL CELLS: 0 /HPF
GLUCOSE QUALITATIVE U: >=1000 MG/DL
KETONES URINE: NEGATIVE MG/DL
LEUKOCYTE ESTERASE URINE: NEGATIVE
MICROSCOPIC-UA: NORMAL
NITRITE URINE: NEGATIVE
PH URINE: 5.5
PROTEIN URINE: NEGATIVE MG/DL
PSA FREE FLD-MCNC: 24 %
PSA FREE SERPL-MCNC: 1.24 NG/ML
PSA SERPL-MCNC: 5.1 NG/ML
RED BLOOD CELLS URINE: 0 /HPF
SPECIFIC GRAVITY URINE: >1.03
UROBILINOGEN URINE: 0.2 MG/DL
WHITE BLOOD CELLS URINE: 1 /HPF

## 2024-06-04 DIAGNOSIS — R97.20 ELEVATED PROSTATE, SPECIFIC ANTIGEN [PSA]: ICD-10-CM

## 2024-07-09 ENCOUNTER — NON-APPOINTMENT (OUTPATIENT)
Age: 69
End: 2024-07-09

## 2024-07-19 ENCOUNTER — APPOINTMENT (OUTPATIENT)
Dept: PULMONOLOGY | Facility: CLINIC | Age: 69
End: 2024-07-19
Payer: MEDICARE

## 2024-07-19 VITALS — HEART RATE: 79 BPM | SYSTOLIC BLOOD PRESSURE: 115 MMHG | DIASTOLIC BLOOD PRESSURE: 71 MMHG | OXYGEN SATURATION: 94 %

## 2024-07-19 DIAGNOSIS — R91.1 SOLITARY PULMONARY NODULE: ICD-10-CM

## 2024-07-19 DIAGNOSIS — R05.9 COUGH, UNSPECIFIED: ICD-10-CM

## 2024-07-19 DIAGNOSIS — I42.9 CARDIOMYOPATHY, UNSPECIFIED: ICD-10-CM

## 2024-07-19 DIAGNOSIS — R93.89 ABNORMAL FINDINGS ON DIAGNOSTIC IMAGING OF OTHER SPECIFIED BODY STRUCTURES: ICD-10-CM

## 2024-07-19 DIAGNOSIS — Z86.16 PERSONAL HISTORY OF COVID-19: ICD-10-CM

## 2024-07-19 DIAGNOSIS — R06.2 WHEEZING: ICD-10-CM

## 2024-07-19 DIAGNOSIS — R91.8 OTHER NONSPECIFIC ABNORMAL FINDING OF LUNG FIELD: ICD-10-CM

## 2024-07-19 PROCEDURE — 94727 GAS DIL/WSHOT DETER LNG VOL: CPT

## 2024-07-19 PROCEDURE — 99205 OFFICE O/P NEW HI 60 MIN: CPT | Mod: 25

## 2024-07-19 PROCEDURE — 94729 DIFFUSING CAPACITY: CPT

## 2024-07-19 PROCEDURE — ZZZZZ: CPT

## 2024-07-19 PROCEDURE — 71046 X-RAY EXAM CHEST 2 VIEWS: CPT

## 2024-07-19 PROCEDURE — 94010 BREATHING CAPACITY TEST: CPT

## 2024-07-19 PROCEDURE — 88738 HGB QUANT TRANSCUTANEOUS: CPT

## 2024-07-22 ENCOUNTER — APPOINTMENT (OUTPATIENT)
Dept: ENDOCRINOLOGY | Facility: CLINIC | Age: 69
End: 2024-07-22
Payer: MEDICARE

## 2024-07-22 ENCOUNTER — APPOINTMENT (OUTPATIENT)
Dept: ELECTROPHYSIOLOGY | Facility: CLINIC | Age: 69
End: 2024-07-22

## 2024-07-22 ENCOUNTER — NON-APPOINTMENT (OUTPATIENT)
Age: 69
End: 2024-07-22

## 2024-07-22 VITALS
OXYGEN SATURATION: 98 % | HEIGHT: 66 IN | SYSTOLIC BLOOD PRESSURE: 138 MMHG | WEIGHT: 177.13 LBS | DIASTOLIC BLOOD PRESSURE: 82 MMHG | HEART RATE: 82 BPM | BODY MASS INDEX: 28.47 KG/M2

## 2024-07-22 VITALS
OXYGEN SATURATION: 98 % | BODY MASS INDEX: 28.57 KG/M2 | SYSTOLIC BLOOD PRESSURE: 136 MMHG | DIASTOLIC BLOOD PRESSURE: 83 MMHG | HEART RATE: 77 BPM | WEIGHT: 177 LBS

## 2024-07-22 DIAGNOSIS — E11.9 TYPE 2 DIABETES MELLITUS W/OUT COMPLICATIONS: ICD-10-CM

## 2024-07-22 DIAGNOSIS — E78.5 HYPERLIPIDEMIA, UNSPECIFIED: ICD-10-CM

## 2024-07-22 DIAGNOSIS — I10 ESSENTIAL (PRIMARY) HYPERTENSION: ICD-10-CM

## 2024-07-22 DIAGNOSIS — E55.9 VITAMIN D DEFICIENCY, UNSPECIFIED: ICD-10-CM

## 2024-07-22 DIAGNOSIS — E27.8 OTHER SPECIFIED DISORDERS OF ADRENAL GLAND: ICD-10-CM

## 2024-07-22 LAB
GLUCOSE BLDC GLUCOMTR-MCNC: 151
HBA1C MFR BLD HPLC: 8
POCT - HEMOGLOBIN (HGB), QUANTITATIVE, TRANSCUTANEOUS: 17.6

## 2024-07-22 PROCEDURE — 82962 GLUCOSE BLOOD TEST: CPT

## 2024-07-22 PROCEDURE — 83036 HEMOGLOBIN GLYCOSYLATED A1C: CPT | Mod: QW

## 2024-07-22 PROCEDURE — XXXXX: CPT | Mod: 1L

## 2024-07-22 PROCEDURE — 36415 COLL VENOUS BLD VENIPUNCTURE: CPT

## 2024-07-22 PROCEDURE — 99214 OFFICE O/P EST MOD 30 MIN: CPT

## 2024-07-22 RX ORDER — ORAL SEMAGLUTIDE 3 MG/1
3 TABLET ORAL
Qty: 90 | Refills: 1 | Status: ACTIVE | COMMUNITY
Start: 2024-07-22 | End: 1900-01-01

## 2024-07-22 RX ORDER — DICLOFENAC SODIUM 3 G/100G
3 GEL TOPICAL
Qty: 1 | Refills: 2 | Status: ACTIVE | COMMUNITY
Start: 2024-07-15

## 2024-08-16 ENCOUNTER — RX RENEWAL (OUTPATIENT)
Age: 69
End: 2024-08-16

## 2024-08-20 ENCOUNTER — APPOINTMENT (OUTPATIENT)
Dept: CARDIOLOGY | Facility: CLINIC | Age: 69
End: 2024-08-20
Payer: MEDICARE

## 2024-08-20 VITALS
HEART RATE: 68 BPM | SYSTOLIC BLOOD PRESSURE: 110 MMHG | TEMPERATURE: 98 F | WEIGHT: 175 LBS | HEIGHT: 66 IN | DIASTOLIC BLOOD PRESSURE: 90 MMHG | BODY MASS INDEX: 28.12 KG/M2 | OXYGEN SATURATION: 98 %

## 2024-08-20 DIAGNOSIS — Z95.810 PRESENCE OF AUTOMATIC (IMPLANTABLE) CARDIAC DEFIBRILLATOR: ICD-10-CM

## 2024-08-20 DIAGNOSIS — Z71.85 ENCOUNTER FOR IMMUNIZATION SAFETY COUNSELING: ICD-10-CM

## 2024-08-20 DIAGNOSIS — I10 ESSENTIAL (PRIMARY) HYPERTENSION: ICD-10-CM

## 2024-08-20 DIAGNOSIS — I77.810 THORACIC AORTIC ECTASIA: ICD-10-CM

## 2024-08-20 DIAGNOSIS — R79.89 OTHER SPECIFIED ABNORMAL FINDINGS OF BLOOD CHEMISTRY: ICD-10-CM

## 2024-08-20 DIAGNOSIS — E11.9 TYPE 2 DIABETES MELLITUS W/OUT COMPLICATIONS: ICD-10-CM

## 2024-08-20 DIAGNOSIS — E78.5 HYPERLIPIDEMIA, UNSPECIFIED: ICD-10-CM

## 2024-08-20 DIAGNOSIS — Z12.5 ENCOUNTER FOR SCREENING FOR MALIGNANT NEOPLASM OF PROSTATE: ICD-10-CM

## 2024-08-20 DIAGNOSIS — E87.5 HYPERKALEMIA: ICD-10-CM

## 2024-08-20 DIAGNOSIS — I25.10 ATHEROSCLEROTIC HEART DISEASE OF NATIVE CORONARY ARTERY W/OUT ANGINA PECTORIS: ICD-10-CM

## 2024-08-20 DIAGNOSIS — M79.606 PAIN IN LEG, UNSPECIFIED: ICD-10-CM

## 2024-08-20 DIAGNOSIS — R91.1 SOLITARY PULMONARY NODULE: ICD-10-CM

## 2024-08-20 DIAGNOSIS — I42.9 CARDIOMYOPATHY, UNSPECIFIED: ICD-10-CM

## 2024-08-20 DIAGNOSIS — Z13.228 ENCOUNTER FOR SCREENING FOR OTHER METABOLIC DISORDERS: ICD-10-CM

## 2024-08-20 PROCEDURE — 93306 TTE W/DOPPLER COMPLETE: CPT

## 2024-08-20 PROCEDURE — 99214 OFFICE O/P EST MOD 30 MIN: CPT | Mod: 25

## 2024-08-20 PROCEDURE — G2211 COMPLEX E/M VISIT ADD ON: CPT

## 2024-08-20 PROCEDURE — 93000 ELECTROCARDIOGRAM COMPLETE: CPT

## 2024-08-20 NOTE — HISTORY OF PRESENT ILLNESS
[FreeTextEntry1] : This is a 69 year y/o male with a PMHx of DM HTN HLD CM +AICD, systolic heart failure presents today for follow up and s/p echo today. Pt reports about a week and a half ago he pulled his hamstring from lifting a heavy basket. Pt reports not taking anything for pain, tolerating. He is s/p echo today, EF 40% (previous 44%). Denies chest pain, palpitations, diaphoresis, vision changes, HA, dizziness, syncope, cough, wheezing, SOB/ELARY, edema, fever, chills, infection.

## 2024-08-20 NOTE — PHYSICAL EXAM

## 2024-08-27 LAB
25(OH)D3 SERPL-MCNC: 48.6 NG/ML
ALBUMIN SERPL ELPH-MCNC: 4.7 G/DL
ALP BLD-CCNC: 53 U/L
ALT SERPL-CCNC: 18 U/L
ANION GAP SERPL CALC-SCNC: 15 MMOL/L
ANION GAP SERPL CALC-SCNC: 16 MMOL/L
ANION GAP SERPL CALC-SCNC: 17 MMOL/L
APPEARANCE: CLEAR
AST SERPL-CCNC: 19 U/L
BACTERIA: NEGATIVE /HPF
BASOPHILS # BLD AUTO: 0.02 K/UL
BASOPHILS NFR BLD AUTO: 0.5 %
BILIRUB DIRECT SERPL-MCNC: 0.2 MG/DL
BILIRUB INDIRECT SERPL-MCNC: 0.5 MG/DL
BILIRUB SERPL-MCNC: 0.6 MG/DL
BILIRUBIN URINE: NEGATIVE
BLOOD URINE: NEGATIVE
BUN SERPL-MCNC: 28 MG/DL
BUN SERPL-MCNC: 29 MG/DL
BUN SERPL-MCNC: 29 MG/DL
CALCIUM SERPL-MCNC: 10.3 MG/DL
CALCIUM SERPL-MCNC: 9.6 MG/DL
CALCIUM SERPL-MCNC: 9.8 MG/DL
CAST: 0 /LPF
CHLORIDE SERPL-SCNC: 103 MMOL/L
CHLORIDE SERPL-SCNC: 104 MMOL/L
CHLORIDE SERPL-SCNC: 105 MMOL/L
CHOLEST SERPL-MCNC: 187 MG/DL
CO2 SERPL-SCNC: 19 MMOL/L
CO2 SERPL-SCNC: 20 MMOL/L
CO2 SERPL-SCNC: 21 MMOL/L
COLOR: YELLOW
CREAT SERPL-MCNC: 1.26 MG/DL
CREAT SERPL-MCNC: 1.34 MG/DL
CREAT SERPL-MCNC: 1.35 MG/DL
CREAT SPEC-SCNC: 121 MG/DL
EGFR: 57 ML/MIN/1.73M2
EGFR: 57 ML/MIN/1.73M2
EGFR: 62 ML/MIN/1.73M2
EOSINOPHIL # BLD AUTO: 0.09 K/UL
EOSINOPHIL NFR BLD AUTO: 2.2 %
EPITHELIAL CELLS: 1 /HPF
ESTIMATED AVERAGE GLUCOSE: 174 MG/DL
GLUCOSE QUALITATIVE U: >=1000 MG/DL
GLUCOSE SERPL-MCNC: 142 MG/DL
GLUCOSE SERPL-MCNC: 146 MG/DL
GLUCOSE SERPL-MCNC: 155 MG/DL
HBA1C MFR BLD HPLC: 7.7 %
HCT VFR BLD CALC: 49.6 %
HDLC SERPL-MCNC: 61 MG/DL
HGB BLD-MCNC: 16.8 G/DL
IMM GRANULOCYTES NFR BLD AUTO: 0.5 %
KETONES URINE: NEGATIVE MG/DL
LDLC SERPL CALC-MCNC: 108 MG/DL
LDLC SERPL DIRECT ASSAY-MCNC: 108 MG/DL
LEUKOCYTE ESTERASE URINE: NEGATIVE
LYMPHOCYTES # BLD AUTO: 1.27 K/UL
LYMPHOCYTES NFR BLD AUTO: 30.9 %
MAGNESIUM SERPL-MCNC: 2.2 MG/DL
MAN DIFF?: NORMAL
MCHC RBC-ENTMCNC: 31.6 PG
MCHC RBC-ENTMCNC: 33.9 GM/DL
MCV RBC AUTO: 93.2 FL
MICROALBUMIN 24H UR DL<=1MG/L-MCNC: <1.2 MG/DL
MICROALBUMIN/CREAT 24H UR-RTO: NORMAL MG/G
MICROSCOPIC-UA: NORMAL
MONOCYTES # BLD AUTO: 0.6 K/UL
MONOCYTES NFR BLD AUTO: 14.6 %
NEUTROPHILS # BLD AUTO: 2.11 K/UL
NEUTROPHILS NFR BLD AUTO: 51.3 %
NITRITE URINE: NEGATIVE
NONHDLC SERPL-MCNC: 126 MG/DL
NT-PROBNP SERPL-MCNC: 38 PG/ML
PH URINE: 5.5
PLATELET # BLD AUTO: 194 K/UL
POTASSIUM SERPL-SCNC: 4.9 MMOL/L
POTASSIUM SERPL-SCNC: 5.1 MMOL/L
POTASSIUM SERPL-SCNC: 5.1 MMOL/L
POTASSIUM SERPL-SCNC: 5.5 MMOL/L
POTASSIUM SERPL-SCNC: 6 MMOL/L
PROT SERPL-MCNC: 7.6 G/DL
PROTEIN URINE: NEGATIVE MG/DL
PSA SERPL-MCNC: 4.73 NG/ML
RBC # BLD: 5.32 M/UL
RBC # FLD: 13.8 %
RED BLOOD CELLS URINE: 0 /HPF
SODIUM SERPL-SCNC: 139 MMOL/L
SODIUM SERPL-SCNC: 140 MMOL/L
SODIUM SERPL-SCNC: 141 MMOL/L
SPECIFIC GRAVITY URINE: 1.03
T4 FREE SERPL-MCNC: 1.4 NG/DL
TRIGL SERPL-MCNC: 99 MG/DL
TSH SERPL-ACNC: 1.98 UIU/ML
UROBILINOGEN URINE: 0.2 MG/DL
WBC # FLD AUTO: 4.11 K/UL
WHITE BLOOD CELLS URINE: 1 /HPF

## 2024-09-03 LAB
ANION GAP SERPL CALC-SCNC: 13 MMOL/L
BUN SERPL-MCNC: 19 MG/DL
CALCIUM SERPL-MCNC: 9.8 MG/DL
CHLORIDE SERPL-SCNC: 108 MMOL/L
CO2 SERPL-SCNC: 23 MMOL/L
CREAT SERPL-MCNC: 1.18 MG/DL
EGFR: 67 ML/MIN/1.73M2
GLUCOSE SERPL-MCNC: 165 MG/DL
POTASSIUM SERPL-SCNC: 4.8 MMOL/L
POTASSIUM SERPL-SCNC: 5 MMOL/L
SODIUM SERPL-SCNC: 144 MMOL/L

## 2024-09-07 ENCOUNTER — RX RENEWAL (OUTPATIENT)
Age: 69
End: 2024-09-07

## 2024-10-21 ENCOUNTER — NON-APPOINTMENT (OUTPATIENT)
Age: 69
End: 2024-10-21

## 2024-10-21 ENCOUNTER — APPOINTMENT (OUTPATIENT)
Dept: ELECTROPHYSIOLOGY | Facility: CLINIC | Age: 69
End: 2024-10-21
Payer: MEDICARE

## 2024-10-21 PROCEDURE — 93295 DEV INTERROG REMOTE 1/2/MLT: CPT

## 2024-10-21 PROCEDURE — 93296 REM INTERROG EVL PM/IDS: CPT

## 2024-10-22 ENCOUNTER — APPOINTMENT (OUTPATIENT)
Dept: ENDOCRINOLOGY | Facility: CLINIC | Age: 69
End: 2024-10-22
Payer: MEDICARE

## 2024-10-22 VITALS
WEIGHT: 178.44 LBS | HEIGHT: 66 IN | DIASTOLIC BLOOD PRESSURE: 80 MMHG | SYSTOLIC BLOOD PRESSURE: 150 MMHG | BODY MASS INDEX: 28.68 KG/M2 | TEMPERATURE: 98 F | OXYGEN SATURATION: 96 % | HEART RATE: 78 BPM

## 2024-10-22 VITALS — DIASTOLIC BLOOD PRESSURE: 80 MMHG | SYSTOLIC BLOOD PRESSURE: 140 MMHG

## 2024-10-22 DIAGNOSIS — E27.9 DISORDER OF ADRENAL GLAND, UNSPECIFIED: ICD-10-CM

## 2024-10-22 DIAGNOSIS — E04.9 NONTOXIC GOITER, UNSPECIFIED: ICD-10-CM

## 2024-10-22 DIAGNOSIS — E78.5 HYPERLIPIDEMIA, UNSPECIFIED: ICD-10-CM

## 2024-10-22 DIAGNOSIS — I10 ESSENTIAL (PRIMARY) HYPERTENSION: ICD-10-CM

## 2024-10-22 DIAGNOSIS — E11.9 TYPE 2 DIABETES MELLITUS W/OUT COMPLICATIONS: ICD-10-CM

## 2024-10-22 DIAGNOSIS — E55.9 VITAMIN D DEFICIENCY, UNSPECIFIED: ICD-10-CM

## 2024-10-22 LAB — GLUCOSE BLDC GLUCOMTR-MCNC: 125

## 2024-10-22 PROCEDURE — 82962 GLUCOSE BLOOD TEST: CPT

## 2024-10-22 PROCEDURE — 99214 OFFICE O/P EST MOD 30 MIN: CPT

## 2024-10-29 ENCOUNTER — APPOINTMENT (OUTPATIENT)
Dept: ORTHOPEDIC SURGERY | Facility: CLINIC | Age: 69
End: 2024-10-29
Payer: MEDICARE

## 2024-10-29 DIAGNOSIS — M21.621 BUNIONETTE OF RIGHT FOOT: ICD-10-CM

## 2024-10-29 DIAGNOSIS — M79.671 PAIN IN RIGHT FOOT: ICD-10-CM

## 2024-10-29 PROCEDURE — 99214 OFFICE O/P EST MOD 30 MIN: CPT

## 2024-10-29 PROCEDURE — 73630 X-RAY EXAM OF FOOT: CPT | Mod: RT

## 2024-10-30 RX ORDER — MELOXICAM 15 MG/1
15 TABLET ORAL
Qty: 14 | Refills: 1 | Status: ACTIVE | COMMUNITY
Start: 2024-10-30 | End: 1900-01-01

## 2024-11-08 ENCOUNTER — APPOINTMENT (OUTPATIENT)
Dept: CARDIOLOGY | Facility: CLINIC | Age: 69
End: 2024-11-08
Payer: MEDICARE

## 2024-11-08 ENCOUNTER — LABORATORY RESULT (OUTPATIENT)
Age: 69
End: 2024-11-08

## 2024-11-08 VITALS
TEMPERATURE: 98.2 F | HEIGHT: 66 IN | HEART RATE: 78 BPM | SYSTOLIC BLOOD PRESSURE: 130 MMHG | DIASTOLIC BLOOD PRESSURE: 80 MMHG | BODY MASS INDEX: 28.61 KG/M2 | OXYGEN SATURATION: 97 % | WEIGHT: 178 LBS

## 2024-11-08 DIAGNOSIS — I77.810 THORACIC AORTIC ECTASIA: ICD-10-CM

## 2024-11-08 DIAGNOSIS — I42.9 CARDIOMYOPATHY, UNSPECIFIED: ICD-10-CM

## 2024-11-08 DIAGNOSIS — Z95.810 PRESENCE OF AUTOMATIC (IMPLANTABLE) CARDIAC DEFIBRILLATOR: ICD-10-CM

## 2024-11-08 DIAGNOSIS — I10 ESSENTIAL (PRIMARY) HYPERTENSION: ICD-10-CM

## 2024-11-08 DIAGNOSIS — E27.9 DISORDER OF ADRENAL GLAND, UNSPECIFIED: ICD-10-CM

## 2024-11-08 DIAGNOSIS — Z13.228 ENCOUNTER FOR SCREENING FOR OTHER METABOLIC DISORDERS: ICD-10-CM

## 2024-11-08 DIAGNOSIS — R91.1 SOLITARY PULMONARY NODULE: ICD-10-CM

## 2024-11-08 DIAGNOSIS — E11.9 TYPE 2 DIABETES MELLITUS W/OUT COMPLICATIONS: ICD-10-CM

## 2024-11-08 DIAGNOSIS — E87.5 HYPERKALEMIA: ICD-10-CM

## 2024-11-08 DIAGNOSIS — Z71.85 ENCOUNTER FOR IMMUNIZATION SAFETY COUNSELING: ICD-10-CM

## 2024-11-08 DIAGNOSIS — I25.10 ATHEROSCLEROTIC HEART DISEASE OF NATIVE CORONARY ARTERY W/OUT ANGINA PECTORIS: ICD-10-CM

## 2024-11-08 DIAGNOSIS — E78.5 HYPERLIPIDEMIA, UNSPECIFIED: ICD-10-CM

## 2024-11-08 DIAGNOSIS — D75.1 SECONDARY POLYCYTHEMIA: ICD-10-CM

## 2024-11-08 DIAGNOSIS — M79.606 PAIN IN LEG, UNSPECIFIED: ICD-10-CM

## 2024-11-08 PROCEDURE — 93000 ELECTROCARDIOGRAM COMPLETE: CPT

## 2024-11-08 PROCEDURE — G2211 COMPLEX E/M VISIT ADD ON: CPT

## 2024-11-08 PROCEDURE — 99214 OFFICE O/P EST MOD 30 MIN: CPT

## 2024-11-11 PROBLEM — D75.1 ERYTHROCYTOSIS: Status: ACTIVE | Noted: 2024-11-11

## 2024-11-11 LAB
ALBUMIN SERPL ELPH-MCNC: 4.6 G/DL
ALP BLD-CCNC: 50 U/L
ALT SERPL-CCNC: 12 U/L
ANION GAP SERPL CALC-SCNC: 15 MMOL/L
APPEARANCE: CLEAR
AST SERPL-CCNC: 19 U/L
BACTERIA: NEGATIVE /HPF
BASOPHILS # BLD AUTO: 0.01 K/UL
BASOPHILS NFR BLD AUTO: 0.3 %
BILIRUB DIRECT SERPL-MCNC: 0.2 MG/DL
BILIRUB INDIRECT SERPL-MCNC: 0.6 MG/DL
BILIRUB SERPL-MCNC: 0.8 MG/DL
BILIRUBIN URINE: NEGATIVE
BLOOD URINE: NEGATIVE
BUN SERPL-MCNC: 17 MG/DL
CALCIUM SERPL-MCNC: 10.1 MG/DL
CAST: 0 /LPF
CHLORIDE SERPL-SCNC: 105 MMOL/L
CHOLEST SERPL-MCNC: 194 MG/DL
CO2 SERPL-SCNC: 23 MMOL/L
COLOR: YELLOW
CREAT SERPL-MCNC: 1 MG/DL
CREAT SPEC-SCNC: 108 MG/DL
EGFR: 81 ML/MIN/1.73M2
EOSINOPHIL # BLD AUTO: 0.07 K/UL
EOSINOPHIL NFR BLD AUTO: 2.3 %
EPITHELIAL CELLS: 1 /HPF
ESTIMATED AVERAGE GLUCOSE: 151 MG/DL
GLUCOSE QUALITATIVE U: >=1000 MG/DL
GLUCOSE SERPL-MCNC: 111 MG/DL
HBA1C MFR BLD HPLC: 6.9 %
HCT VFR BLD CALC: 55.8 %
HDLC SERPL-MCNC: 78 MG/DL
HGB BLD-MCNC: 18.3 G/DL
IMM GRANULOCYTES NFR BLD AUTO: 0 %
KETONES URINE: NEGATIVE MG/DL
LDLC SERPL CALC-MCNC: 102 MG/DL
LEUKOCYTE ESTERASE URINE: NEGATIVE
LYMPHOCYTES # BLD AUTO: 1.12 K/UL
LYMPHOCYTES NFR BLD AUTO: 37.1 %
MAGNESIUM SERPL-MCNC: 2.2 MG/DL
MAN DIFF?: NORMAL
MCHC RBC-ENTMCNC: 31.2 PG
MCHC RBC-ENTMCNC: 32.8 G/DL
MCV RBC AUTO: 95.2 FL
MICROALBUMIN 24H UR DL<=1MG/L-MCNC: 2.6 MG/DL
MICROALBUMIN/CREAT 24H UR-RTO: 24 MG/G
MICROSCOPIC-UA: NORMAL
MONOCYTES # BLD AUTO: 0.56 K/UL
MONOCYTES NFR BLD AUTO: 18.5 %
NEUTROPHILS # BLD AUTO: 1.26 K/UL
NEUTROPHILS NFR BLD AUTO: 41.8 %
NITRITE URINE: NEGATIVE
NONHDLC SERPL-MCNC: 116 MG/DL
NT-PROBNP SERPL-MCNC: 80 PG/ML
PH URINE: 6
PLATELET # BLD AUTO: 166 K/UL
POTASSIUM SERPL-SCNC: 4.6 MMOL/L
POTASSIUM SERPL-SCNC: 4.8 MMOL/L
PROT SERPL-MCNC: 7.3 G/DL
PROTEIN URINE: NORMAL MG/DL
RBC # BLD: 5.86 M/UL
RBC # FLD: 15.2 %
RED BLOOD CELLS URINE: 0 /HPF
SODIUM SERPL-SCNC: 143 MMOL/L
SPECIFIC GRAVITY URINE: >1.03
T4 FREE SERPL-MCNC: 1.5 NG/DL
TRIGL SERPL-MCNC: 79 MG/DL
TSH SERPL-ACNC: 1.95 UIU/ML
UROBILINOGEN URINE: 0.2 MG/DL
WBC # FLD AUTO: 3.02 K/UL
WHITE BLOOD CELLS URINE: 0 /HPF

## 2024-11-12 ENCOUNTER — APPOINTMENT (OUTPATIENT)
Dept: UROLOGY | Facility: CLINIC | Age: 69
End: 2024-11-12
Payer: MEDICARE

## 2024-11-12 VITALS — SYSTOLIC BLOOD PRESSURE: 146 MMHG | DIASTOLIC BLOOD PRESSURE: 85 MMHG | HEART RATE: 76 BPM

## 2024-11-12 DIAGNOSIS — N40.1 BENIGN PROSTATIC HYPERPLASIA WITH LOWER URINARY TRACT SYMPMS: ICD-10-CM

## 2024-11-12 DIAGNOSIS — N13.8 BENIGN PROSTATIC HYPERPLASIA WITH LOWER URINARY TRACT SYMPMS: ICD-10-CM

## 2024-11-12 DIAGNOSIS — R97.20 ELEVATED PROSTATE, SPECIFIC ANTIGEN [PSA]: ICD-10-CM

## 2024-11-12 PROCEDURE — 51798 US URINE CAPACITY MEASURE: CPT

## 2024-11-12 PROCEDURE — G2211 COMPLEX E/M VISIT ADD ON: CPT

## 2024-11-12 PROCEDURE — 51741 ELECTRO-UROFLOWMETRY FIRST: CPT

## 2024-11-12 PROCEDURE — 99213 OFFICE O/P EST LOW 20 MIN: CPT | Mod: 25

## 2024-11-12 RX ORDER — SILDENAFIL 100 MG/1
100 TABLET, FILM COATED ORAL
Qty: 10 | Refills: 5 | Status: ACTIVE | COMMUNITY
Start: 2024-11-12 | End: 1900-01-01

## 2024-11-13 ENCOUNTER — LABORATORY RESULT (OUTPATIENT)
Age: 69
End: 2024-11-13

## 2024-11-13 ENCOUNTER — APPOINTMENT (OUTPATIENT)
Dept: ORTHOPEDIC SURGERY | Facility: CLINIC | Age: 69
End: 2024-11-13
Payer: OTHER MISCELLANEOUS

## 2024-11-13 DIAGNOSIS — Z98.1 ARTHRODESIS STATUS: ICD-10-CM

## 2024-11-13 LAB
APPEARANCE: CLEAR
BACTERIA: NEGATIVE /HPF
BASOPHILS # BLD AUTO: 0.02 K/UL
BASOPHILS NFR BLD AUTO: 0.6 %
BILIRUBIN URINE: NEGATIVE
BLOOD URINE: NEGATIVE
CAST: 0 /LPF
COLOR: YELLOW
EOSINOPHIL # BLD AUTO: 0.08 K/UL
EOSINOPHIL NFR BLD AUTO: 2.5 %
EPITHELIAL CELLS: 0 /HPF
GLUCOSE QUALITATIVE U: >=1000 MG/DL
HCT VFR BLD CALC: 55.3 %
HGB BLD-MCNC: 18.6 G/DL
IMM GRANULOCYTES NFR BLD AUTO: 0 %
KETONES URINE: NEGATIVE MG/DL
LEUKOCYTE ESTERASE URINE: NEGATIVE
LYMPHOCYTES # BLD AUTO: 1.17 K/UL
LYMPHOCYTES NFR BLD AUTO: 36.1 %
MAN DIFF?: NORMAL
MCHC RBC-ENTMCNC: 31.2 PG
MCHC RBC-ENTMCNC: 33.6 G/DL
MCV RBC AUTO: 92.8 FL
MICROSCOPIC-UA: NORMAL
MONOCYTES # BLD AUTO: 0.56 K/UL
MONOCYTES NFR BLD AUTO: 17.3 %
NEUTROPHILS # BLD AUTO: 1.41 K/UL
NEUTROPHILS NFR BLD AUTO: 43.5 %
NITRITE URINE: NEGATIVE
PH URINE: 6
PLATELET # BLD AUTO: 160 K/UL
PROTEIN URINE: NEGATIVE MG/DL
RBC # BLD: 5.96 M/UL
RBC # FLD: 14.5 %
RED BLOOD CELLS URINE: 0 /HPF
SPECIFIC GRAVITY URINE: 1.02
UROBILINOGEN URINE: 0.2 MG/DL
WBC # FLD AUTO: 3.24 K/UL
WHITE BLOOD CELLS URINE: 0 /HPF

## 2024-11-13 PROCEDURE — 72100 X-RAY EXAM L-S SPINE 2/3 VWS: CPT

## 2024-11-13 PROCEDURE — 99213 OFFICE O/P EST LOW 20 MIN: CPT

## 2024-11-14 LAB — BACTERIA UR CULT: NORMAL

## 2024-11-15 LAB — EPO SERPL-MCNC: 18.1 MIU/ML

## 2024-11-19 ENCOUNTER — RESULT REVIEW (OUTPATIENT)
Age: 69
End: 2024-11-19

## 2024-11-20 ENCOUNTER — APPOINTMENT (OUTPATIENT)
Dept: ULTRASOUND IMAGING | Facility: CLINIC | Age: 69
End: 2024-11-20
Payer: MEDICARE

## 2024-11-20 ENCOUNTER — OUTPATIENT (OUTPATIENT)
Dept: OUTPATIENT SERVICES | Facility: HOSPITAL | Age: 69
LOS: 1 days | End: 2024-11-20
Payer: MEDICARE

## 2024-11-20 DIAGNOSIS — Z00.8 ENCOUNTER FOR OTHER GENERAL EXAMINATION: ICD-10-CM

## 2024-11-20 DIAGNOSIS — Z98.890 OTHER SPECIFIED POSTPROCEDURAL STATES: Chronic | ICD-10-CM

## 2024-11-20 DIAGNOSIS — Z98.89 OTHER SPECIFIED POSTPROCEDURAL STATES: Chronic | ICD-10-CM

## 2024-11-20 DIAGNOSIS — Z95.810 PRESENCE OF AUTOMATIC (IMPLANTABLE) CARDIAC DEFIBRILLATOR: Chronic | ICD-10-CM

## 2024-11-20 DIAGNOSIS — S55.001A UNSPECIFIED INJURY OF ULNAR ARTERY AT FOREARM LEVEL, RIGHT ARM, INITIAL ENCOUNTER: Chronic | ICD-10-CM

## 2024-11-20 DIAGNOSIS — R97.20 ELEVATED PROSTATE SPECIFIC ANTIGEN [PSA]: ICD-10-CM

## 2024-11-20 PROCEDURE — 76856 US EXAM PELVIC COMPLETE: CPT | Mod: 26

## 2024-11-20 PROCEDURE — 76856 US EXAM PELVIC COMPLETE: CPT

## 2024-11-25 ENCOUNTER — APPOINTMENT (OUTPATIENT)
Dept: CARDIOLOGY | Facility: CLINIC | Age: 69
End: 2024-11-25
Payer: MEDICARE

## 2024-11-25 VITALS
SYSTOLIC BLOOD PRESSURE: 130 MMHG | HEART RATE: 76 BPM | HEIGHT: 66 IN | DIASTOLIC BLOOD PRESSURE: 80 MMHG | BODY MASS INDEX: 27.97 KG/M2 | OXYGEN SATURATION: 97 % | WEIGHT: 174 LBS

## 2024-11-25 DIAGNOSIS — I10 ESSENTIAL (PRIMARY) HYPERTENSION: ICD-10-CM

## 2024-11-25 DIAGNOSIS — I42.0 DILATED CARDIOMYOPATHY: ICD-10-CM

## 2024-11-25 DIAGNOSIS — E78.5 HYPERLIPIDEMIA, UNSPECIFIED: ICD-10-CM

## 2024-11-25 PROCEDURE — 99214 OFFICE O/P EST MOD 30 MIN: CPT

## 2024-11-25 PROCEDURE — G2211 COMPLEX E/M VISIT ADD ON: CPT

## 2024-11-25 RX ORDER — EZETIMIBE 10 MG/1
10 TABLET ORAL
Qty: 90 | Refills: 1 | Status: ACTIVE | COMMUNITY
Start: 2024-11-25 | End: 1900-01-01

## 2024-12-17 NOTE — ASU PATIENT PROFILE, ADULT - NS TRANSFER EYEGLASSES PAIRS
SUBJECTIVE:    Patient ID: Tisha Wyatt is a 72 y.o. female.    CC: Hypertension, acute kidney injury    HPI: The patient presents to the office today for short follow-up of hypertension and acute kidney injury.    Patient has a history of hypertension.  At previous appointments, this was not at goal so hydrochlorothiazide was started.  She tolerated the medication well and her blood pressure improved but she had mild renal insufficiency with creatinine 1.4, up from 1.2.  Patient was asked to increase her water intake and return to the office for blood pressure recheck.      Past Medical History:   Diagnosis Date    HTN (hypertension)     Hyperlipemia         Current Outpatient Medications   Medication Sig Dispense Refill    hydroCHLOROthiazide (HYDRODIURIL) 25 MG tablet Take 1 tablet by mouth every morning 90 tablet 3    atorvastatin (LIPITOR) 20 MG tablet Take 1 tablet by mouth nightly 90 tablet 3    lisinopril (PRINIVIL;ZESTRIL) 40 MG tablet Take 1 tablet by mouth daily 90 tablet 3    metoprolol tartrate (LOPRESSOR) 50 MG tablet Take 1 tablet by mouth 2 times daily 180 tablet 3    Nutritional Supplements (WOMENS HEALTH SUPPORT PO) Take by mouth      Cholecalciferol (VITAMIN D-3 PO) Take by mouth       No current facility-administered medications for this visit.           Review of Systems   Constitutional: Negative.    Respiratory: Negative.     Cardiovascular: Negative.    Gastrointestinal: Negative.    Genitourinary: Negative.    Musculoskeletal: Negative.    Neurological: Negative.    Psychiatric/Behavioral: Negative.     All other systems reviewed and are negative.        OBJECTIVE:  Physical Exam  Constitutional:       Appearance: Normal appearance.   HENT:      Head: Normocephalic and atraumatic.   Cardiovascular:      Rate and Rhythm: Normal rate and regular rhythm.   Pulmonary:      Effort: Pulmonary effort is normal. No respiratory distress.      Breath sounds: No wheezing, rhonchi or rales. 
1 pair

## 2024-12-31 ENCOUNTER — DOCTOR'S OFFICE (OUTPATIENT)
Facility: LOCATION | Age: 69
Setting detail: OPHTHALMOLOGY
End: 2024-12-31
Payer: MEDICARE

## 2024-12-31 DIAGNOSIS — H00.025: ICD-10-CM

## 2024-12-31 DIAGNOSIS — H35.033: ICD-10-CM

## 2024-12-31 DIAGNOSIS — H25.13: ICD-10-CM

## 2024-12-31 DIAGNOSIS — H00.022: ICD-10-CM

## 2024-12-31 DIAGNOSIS — H35.373: ICD-10-CM

## 2024-12-31 DIAGNOSIS — H16.223: ICD-10-CM

## 2024-12-31 DIAGNOSIS — H43.393: ICD-10-CM

## 2024-12-31 DIAGNOSIS — H01.001: ICD-10-CM

## 2024-12-31 DIAGNOSIS — E11.3292: ICD-10-CM

## 2024-12-31 DIAGNOSIS — H40.013: ICD-10-CM

## 2024-12-31 PROCEDURE — 92133 CPTRZD OPH DX IMG PST SGM ON: CPT | Performed by: OPHTHALMOLOGY

## 2024-12-31 PROCEDURE — 92004 COMPRE OPH EXAM NEW PT 1/>: CPT | Performed by: OPHTHALMOLOGY

## 2024-12-31 ASSESSMENT — REFRACTION_CURRENTRX
OD_SPHERE: +2.25
OD_AXIS: 005
OS_AXIS: 168
OS_ADD: +2.00
OD_CYLINDER: +1.00
OD_CYLINDER: +1.50
OD_ADD: +1.25
OD_SPHERE: +2.00
OS_CYLINDER: +1.25
OS_OVR_VA: 20/
OD_VPRISM_DIRECTION: PROGS
OS_AXIS: 169
OS_VPRISM_DIRECTION: PROGS
OD_ADD: +2.00
OS_OVR_VA: 20/
OD_AXIS: 010
OS_SPHERE: +2.00
OD_AXIS: 013
OS_SPHERE: +2.50
OS_ADD: +2.25
OS_CYLINDER: +1.50
OS_CYLINDER: +1.50
OD_CYLINDER: +1.25
OD_VPRISM_DIRECTION: PROGS
OS_VPRISM_DIRECTION: PROGS
OS_CYLINDER: +1.00
OD_AXIS: 002
OD_ADD: +2.00
OD_OVR_VA: 20/
OD_VPRISM_DIRECTION: PROGS
OS_ADD: +2.00
OD_SPHERE: +2.00
OS_SPHERE: +2.00
OD_CYLINDER: +1.50
OD_OVR_VA: 20/
OS_ADD: +1.00
OS_SPHERE: +2.25
OD_OVR_VA: 20/
OS_VPRISM_DIRECTION: PROGS
OD_ADD: +2.25
OS_AXIS: 162
OS_OVR_VA: 20/
OS_AXIS: 175
OD_SPHERE: +2.00

## 2024-12-31 ASSESSMENT — REFRACTION_MANIFEST
OS_VA1: 20/20
OS_VA1: 20/20
OS_CYLINDER: +1.50
OD_SPHERE: +2.00
OD_VA1: 20/20
OS_SPHERE: +2.00
OD_SPHERE: +2.00
OS_AXIS: 170
OD_AXIS: 015
OD_VA1: 20/20
OD_AXIS: 15
OS_CYLINDER: +1.25
OD_CYLINDER: +1.25
OS_AXIS: 165
OD_CYLINDER: +1.25
OS_SPHERE: +2.00

## 2024-12-31 ASSESSMENT — PACHYMETRY
OD_CT_CORRECTION: 1
OS_CT_CORRECTION: 1
OD_CT_UM: 526
OS_CT_UM: 531

## 2024-12-31 ASSESSMENT — CONFRONTATIONAL VISUAL FIELD TEST (CVF)
OS_FINDINGS: FULL
OD_FINDINGS: FULL

## 2024-12-31 ASSESSMENT — VISUAL ACUITY
OS_BCVA: 20/20-2
OD_BCVA: 20/20

## 2025-01-11 ENCOUNTER — INPATIENT (INPATIENT)
Facility: HOSPITAL | Age: 70
LOS: 1 days | Discharge: HOME | DRG: 998 | End: 2025-01-13
Attending: STUDENT IN AN ORGANIZED HEALTH CARE EDUCATION/TRAINING PROGRAM | Admitting: STUDENT IN AN ORGANIZED HEALTH CARE EDUCATION/TRAINING PROGRAM
Payer: SELF-PAY

## 2025-01-11 VITALS
DIASTOLIC BLOOD PRESSURE: 88 MMHG | OXYGEN SATURATION: 99 % | SYSTOLIC BLOOD PRESSURE: 163 MMHG | HEART RATE: 72 BPM | TEMPERATURE: 98 F | RESPIRATION RATE: 18 BRPM

## 2025-01-11 DIAGNOSIS — Z98.89 OTHER SPECIFIED POSTPROCEDURAL STATES: Chronic | ICD-10-CM

## 2025-01-11 DIAGNOSIS — Z98.1 ARTHRODESIS STATUS: Chronic | ICD-10-CM

## 2025-01-11 DIAGNOSIS — Z95.810 PRESENCE OF AUTOMATIC (IMPLANTABLE) CARDIAC DEFIBRILLATOR: Chronic | ICD-10-CM
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
## 2025-01-11 DIAGNOSIS — S55.001A UNSPECIFIED INJURY OF ULNAR ARTERY AT FOREARM LEVEL, RIGHT ARM, INITIAL ENCOUNTER: Chronic | ICD-10-CM

## 2025-01-11 DIAGNOSIS — I50.22 CHRONIC SYSTOLIC (CONGESTIVE) HEART FAILURE: ICD-10-CM

## 2025-01-11 DIAGNOSIS — Z98.890 OTHER SPECIFIED POSTPROCEDURAL STATES: Chronic | ICD-10-CM

## 2025-01-11 DIAGNOSIS — Z29.9 ENCOUNTER FOR PROPHYLACTIC MEASURES, UNSPECIFIED: ICD-10-CM
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
## 2025-01-11 DIAGNOSIS — I65.1 OCCLUSION AND STENOSIS OF BASILAR ARTERY: ICD-10-CM

## 2025-01-11 DIAGNOSIS — E11.9 TYPE 2 DIABETES MELLITUS WITHOUT COMPLICATIONS: ICD-10-CM

## 2025-01-11 DIAGNOSIS — J93.9 PNEUMOTHORAX, UNSPECIFIED: ICD-10-CM

## 2025-01-11 LAB
ADD ON TEST-SPECIMEN IN LAB: SIGNIFICANT CHANGE UP
ADD ON TEST-SPECIMEN IN LAB: SIGNIFICANT CHANGE UP
ALBUMIN SERPL ELPH-MCNC: 4 G/DL — SIGNIFICANT CHANGE UP (ref 3.3–5)
ALP SERPL-CCNC: 53 U/L — SIGNIFICANT CHANGE UP (ref 40–120)
ALT FLD-CCNC: 12 U/L — SIGNIFICANT CHANGE UP (ref 10–45)
ANION GAP SERPL CALC-SCNC: 13 MMOL/L — SIGNIFICANT CHANGE UP (ref 5–17)
APPEARANCE UR: CLEAR — SIGNIFICANT CHANGE UP
APTT BLD: 29.2 SEC — SIGNIFICANT CHANGE UP (ref 24.5–35.6)
AST SERPL-CCNC: 18 U/L — SIGNIFICANT CHANGE UP (ref 10–40)
BASOPHILS # BLD AUTO: 0.02 K/UL — SIGNIFICANT CHANGE UP (ref 0–0.2)
BASOPHILS NFR BLD AUTO: 0.5 % — SIGNIFICANT CHANGE UP (ref 0–2)
BILIRUB SERPL-MCNC: 0.3 MG/DL — SIGNIFICANT CHANGE UP (ref 0.2–1.2)
BILIRUB UR-MCNC: NEGATIVE — SIGNIFICANT CHANGE UP
BLD GP AB SCN SERPL QL: NEGATIVE — SIGNIFICANT CHANGE UP
BUN SERPL-MCNC: 20 MG/DL — SIGNIFICANT CHANGE UP (ref 7–23)
CALCIUM SERPL-MCNC: 9.8 MG/DL — SIGNIFICANT CHANGE UP (ref 8.4–10.5)
CHLORIDE SERPL-SCNC: 107 MMOL/L — SIGNIFICANT CHANGE UP (ref 96–108)
CK MB BLD-MCNC: 1.3 % — SIGNIFICANT CHANGE UP (ref 0–3.5)
CK MB CFR SERPL CALC: 2.3 NG/ML — SIGNIFICANT CHANGE UP (ref 0–6.7)
CK SERPL-CCNC: 175 U/L — SIGNIFICANT CHANGE UP (ref 30–200)
CO2 SERPL-SCNC: 19 MMOL/L — LOW (ref 22–31)
COLOR SPEC: YELLOW — SIGNIFICANT CHANGE UP
CREAT SERPL-MCNC: 0.93 MG/DL — SIGNIFICANT CHANGE UP (ref 0.5–1.3)
DIFF PNL FLD: NEGATIVE — SIGNIFICANT CHANGE UP
EGFR: 87 ML/MIN/1.73M2 — SIGNIFICANT CHANGE UP
EOSINOPHIL # BLD AUTO: 0.09 K/UL — SIGNIFICANT CHANGE UP (ref 0–0.5)
EOSINOPHIL NFR BLD AUTO: 2.4 % — SIGNIFICANT CHANGE UP (ref 0–6)
ETHANOL SERPL-MCNC: <10 MG/DL — SIGNIFICANT CHANGE UP (ref 0–10)
GLUCOSE BLDC GLUCOMTR-MCNC: 107 MG/DL — HIGH (ref 70–99)
GLUCOSE BLDC GLUCOMTR-MCNC: 93 MG/DL — SIGNIFICANT CHANGE UP (ref 70–99)
GLUCOSE SERPL-MCNC: 162 MG/DL — HIGH (ref 70–99)
GLUCOSE UR QL: >=1000 MG/DL
HCT VFR BLD CALC: 50.8 % — HIGH (ref 39–50)
HGB BLD-MCNC: 17 G/DL — SIGNIFICANT CHANGE UP (ref 13–17)
IMM GRANULOCYTES NFR BLD AUTO: 0.3 % — SIGNIFICANT CHANGE UP (ref 0–0.9)
INR BLD: 0.95 RATIO — SIGNIFICANT CHANGE UP (ref 0.85–1.16)
KETONES UR-MCNC: NEGATIVE MG/DL — SIGNIFICANT CHANGE UP
LACTATE SERPL-SCNC: 0.9 MMOL/L — SIGNIFICANT CHANGE UP (ref 0.5–2)
LEUKOCYTE ESTERASE UR-ACNC: NEGATIVE — SIGNIFICANT CHANGE UP
LIDOCAIN IGE QN: 97 U/L — HIGH (ref 7–60)
LYMPHOCYTES # BLD AUTO: 1.18 K/UL — SIGNIFICANT CHANGE UP (ref 1–3.3)
LYMPHOCYTES # BLD AUTO: 30.9 % — SIGNIFICANT CHANGE UP (ref 13–44)
MCHC RBC-ENTMCNC: 29 PG — SIGNIFICANT CHANGE UP (ref 27–34)
MCHC RBC-ENTMCNC: 33.5 G/DL — SIGNIFICANT CHANGE UP (ref 32–36)
MCV RBC AUTO: 86.7 FL — SIGNIFICANT CHANGE UP (ref 80–100)
MONOCYTES # BLD AUTO: 0.61 K/UL — SIGNIFICANT CHANGE UP (ref 0–0.9)
MONOCYTES NFR BLD AUTO: 16 % — HIGH (ref 2–14)
NEUTROPHILS # BLD AUTO: 1.91 K/UL — SIGNIFICANT CHANGE UP (ref 1.8–7.4)
NEUTROPHILS NFR BLD AUTO: 49.9 % — SIGNIFICANT CHANGE UP (ref 43–77)
NITRITE UR-MCNC: NEGATIVE — SIGNIFICANT CHANGE UP
NRBC # BLD: 0 /100 WBCS — SIGNIFICANT CHANGE UP (ref 0–0)
NT-PROBNP SERPL-SCNC: 78 PG/ML — SIGNIFICANT CHANGE UP (ref 0–300)
PH UR: 7 — SIGNIFICANT CHANGE UP (ref 5–8)
PLATELET # BLD AUTO: 172 K/UL — SIGNIFICANT CHANGE UP (ref 150–400)
POTASSIUM SERPL-MCNC: 4.2 MMOL/L — SIGNIFICANT CHANGE UP (ref 3.5–5.3)
POTASSIUM SERPL-SCNC: 4.2 MMOL/L — SIGNIFICANT CHANGE UP (ref 3.5–5.3)
PROT SERPL-MCNC: 7.1 G/DL — SIGNIFICANT CHANGE UP (ref 6–8.3)
PROT UR-MCNC: NEGATIVE MG/DL — SIGNIFICANT CHANGE UP
PROTHROM AB SERPL-ACNC: 10.9 SEC — SIGNIFICANT CHANGE UP (ref 9.9–13.4)
RBC # BLD: 5.86 M/UL — HIGH (ref 4.2–5.8)
RBC # FLD: 13.6 % — SIGNIFICANT CHANGE UP (ref 10.3–14.5)
RH IG SCN BLD-IMP: POSITIVE — SIGNIFICANT CHANGE UP
SODIUM SERPL-SCNC: 139 MMOL/L — SIGNIFICANT CHANGE UP (ref 135–145)
SP GR SPEC: >1.03 — HIGH (ref 1–1.03)
TROPONIN T, HIGH SENSITIVITY RESULT: 10 NG/L — SIGNIFICANT CHANGE UP (ref 0–51)
UROBILINOGEN FLD QL: 0.2 MG/DL — SIGNIFICANT CHANGE UP (ref 0.2–1)
WBC # BLD: 3.82 K/UL — SIGNIFICANT CHANGE UP (ref 3.8–10.5)
WBC # FLD AUTO: 3.82 K/UL — SIGNIFICANT CHANGE UP (ref 3.8–10.5)

## 2025-01-11 PROCEDURE — 70450 CT HEAD/BRAIN W/O DYE: CPT | Mod: 26,XU

## 2025-01-11 PROCEDURE — 72125 CT NECK SPINE W/O DYE: CPT | Mod: 26

## 2025-01-11 PROCEDURE — 70498 CT ANGIOGRAPHY NECK: CPT | Mod: 26

## 2025-01-11 PROCEDURE — 71260 CT THORAX DX C+: CPT | Mod: 26

## 2025-01-11 PROCEDURE — 99223 1ST HOSP IP/OBS HIGH 75: CPT

## 2025-01-11 PROCEDURE — 74177 CT ABD & PELVIS W/CONTRAST: CPT | Mod: 26

## 2025-01-11 PROCEDURE — 70496 CT ANGIOGRAPHY HEAD: CPT | Mod: 26

## 2025-01-11 PROCEDURE — 99284 EMERGENCY DEPT VISIT MOD MDM: CPT

## 2025-01-11 RX ORDER — MORPHINE SULFATE 15 MG
2 TABLET, EXTENDED RELEASE ORAL EVERY 6 HOURS
Refills: 0 | Status: DISCONTINUED | OUTPATIENT
Start: 2025-01-11 | End: 2025-01-11

## 2025-01-11 RX ORDER — HYDROMORPHONE HCL 4 MG
0.5 TABLET ORAL ONCE
Refills: 0 | Status: DISCONTINUED | OUTPATIENT
Start: 2025-01-11 | End: 2025-01-11

## 2025-01-11 RX ORDER — OXYCODONE HCL 15 MG
5 TABLET ORAL EVERY 6 HOURS
Refills: 0 | Status: DISCONTINUED | OUTPATIENT
Start: 2025-01-11 | End: 2025-01-13

## 2025-01-11 RX ORDER — DEXTROSE MONOHYDRATE 25 G/50ML
12.5 INJECTION, SOLUTION INTRAVENOUS ONCE
Refills: 0 | Status: DISCONTINUED | OUTPATIENT
Start: 2025-01-11 | End: 2025-01-13

## 2025-01-11 RX ORDER — HEPARIN SODIUM 1000 [USP'U]/ML
5000 INJECTION, SOLUTION INTRAVENOUS; SUBCUTANEOUS THREE TIMES A DAY
Refills: 0 | Status: DISCONTINUED | OUTPATIENT
Start: 2025-01-11 | End: 2025-01-13

## 2025-01-11 RX ORDER — DEXTROSE MONOHYDRATE 25 G/50ML
25 INJECTION, SOLUTION INTRAVENOUS ONCE
Refills: 0 | Status: DISCONTINUED | OUTPATIENT
Start: 2025-01-11 | End: 2025-01-13

## 2025-01-11 RX ORDER — ACETAMINOPHEN 80 MG/.8ML
650 SOLUTION/ DROPS ORAL EVERY 6 HOURS
Refills: 0 | Status: DISCONTINUED | OUTPATIENT
Start: 2025-01-11 | End: 2025-01-11

## 2025-01-11 RX ORDER — MAG HYDROX/ALUMINUM HYD/SIMETH 200-200-20
30 SUSPENSION, ORAL (FINAL DOSE FORM) ORAL EVERY 4 HOURS
Refills: 0 | Status: DISCONTINUED | OUTPATIENT
Start: 2025-01-11 | End: 2025-01-13

## 2025-01-11 RX ORDER — LIDOCAINE 50 MG/G
1 OINTMENT TOPICAL DAILY
Refills: 0 | Status: DISCONTINUED | OUTPATIENT
Start: 2025-01-11 | End: 2025-01-13

## 2025-01-11 RX ORDER — GINKGO BILOBA 40 MG
3 CAPSULE ORAL AT BEDTIME
Refills: 0 | Status: DISCONTINUED | OUTPATIENT
Start: 2025-01-11 | End: 2025-01-13

## 2025-01-11 RX ORDER — SODIUM CHLORIDE 9 MG/ML
1000 INJECTION, SOLUTION INTRAVENOUS
Refills: 0 | Status: DISCONTINUED | OUTPATIENT
Start: 2025-01-11 | End: 2025-01-13

## 2025-01-11 RX ORDER — HYDROMORPHONE HCL 4 MG
0.5 TABLET ORAL EVERY 4 HOURS
Refills: 0 | Status: DISCONTINUED | OUTPATIENT
Start: 2025-01-11 | End: 2025-01-13

## 2025-01-11 RX ORDER — GLUCAGON INJECTION, SOLUTION 0.5 MG/.1ML
1 INJECTION, SOLUTION SUBCUTANEOUS ONCE
Refills: 0 | Status: DISCONTINUED | OUTPATIENT
Start: 2025-01-11 | End: 2025-01-13

## 2025-01-11 RX ORDER — ACETAMINOPHEN 80 MG/.8ML
975 SOLUTION/ DROPS ORAL EVERY 8 HOURS
Refills: 0 | Status: DISCONTINUED | OUTPATIENT
Start: 2025-01-11 | End: 2025-01-13

## 2025-01-11 RX ORDER — SACUBITRIL AND VALSARTAN 24; 26 MG/1; MG/1
1 TABLET, FILM COATED ORAL
Refills: 0 | Status: DISCONTINUED | OUTPATIENT
Start: 2025-01-11 | End: 2025-01-13

## 2025-01-11 RX ORDER — INSULIN LISPRO 100/ML
VIAL (ML) SUBCUTANEOUS AT BEDTIME
Refills: 0 | Status: DISCONTINUED | OUTPATIENT
Start: 2025-01-11 | End: 2025-01-13

## 2025-01-11 RX ORDER — ONDANSETRON 4 MG/1
4 TABLET ORAL EVERY 8 HOURS
Refills: 0 | Status: DISCONTINUED | OUTPATIENT
Start: 2025-01-11 | End: 2025-01-13

## 2025-01-11 RX ORDER — DEXTROSE MONOHYDRATE 25 G/50ML
15 INJECTION, SOLUTION INTRAVENOUS ONCE
Refills: 0 | Status: DISCONTINUED | OUTPATIENT
Start: 2025-01-11 | End: 2025-01-13

## 2025-01-11 RX ORDER — ASPIRIN 81 MG
81 TABLET, DELAYED RELEASE (ENTERIC COATED) ORAL DAILY
Refills: 0 | Status: DISCONTINUED | OUTPATIENT
Start: 2025-01-11 | End: 2025-01-13

## 2025-01-11 RX ORDER — INSULIN LISPRO 100/ML
VIAL (ML) SUBCUTANEOUS
Refills: 0 | Status: DISCONTINUED | OUTPATIENT
Start: 2025-01-11 | End: 2025-01-13

## 2025-01-11 RX ORDER — ACETAMINOPHEN 80 MG/.8ML
1000 SOLUTION/ DROPS ORAL ONCE
Refills: 0 | Status: COMPLETED | OUTPATIENT
Start: 2025-01-11 | End: 2025-01-11

## 2025-01-11 RX ORDER — ASCORBIC ACID 1000 MG
500 TABLET ORAL DAILY
Refills: 0 | Status: DISCONTINUED | OUTPATIENT
Start: 2025-01-11 | End: 2025-01-13

## 2025-01-11 RX ORDER — CARVEDILOL 25 MG/1
25 TABLET, FILM COATED ORAL EVERY 12 HOURS
Refills: 0 | Status: DISCONTINUED | OUTPATIENT
Start: 2025-01-11 | End: 2025-01-13

## 2025-01-11 RX ORDER — MORPHINE SULFATE 15 MG
2 TABLET, EXTENDED RELEASE ORAL ONCE
Refills: 0 | Status: DISCONTINUED | OUTPATIENT
Start: 2025-01-11 | End: 2025-01-11

## 2025-01-11 RX ORDER — PANTOPRAZOLE 40 MG/1
40 TABLET, DELAYED RELEASE ORAL
Refills: 0 | Status: DISCONTINUED | OUTPATIENT
Start: 2025-01-11 | End: 2025-01-13

## 2025-01-11 RX ORDER — EZETIMIBE 10 MG/1
10 TABLET ORAL DAILY
Refills: 0 | Status: DISCONTINUED | OUTPATIENT
Start: 2025-01-11 | End: 2025-01-13

## 2025-01-11 RX ORDER — CHOLECALCIFEROL (VITAMIN D3) 10 MCG
1000 TABLET ORAL DAILY
Refills: 0 | Status: DISCONTINUED | OUTPATIENT
Start: 2025-01-11 | End: 2025-01-13

## 2025-01-11 RX ADMIN — ACETAMINOPHEN 975 MILLIGRAM(S): 80 SOLUTION/ DROPS ORAL at 20:31

## 2025-01-11 RX ADMIN — HEPARIN SODIUM 5000 UNIT(S): 1000 INJECTION, SOLUTION INTRAVENOUS; SUBCUTANEOUS at 23:00

## 2025-01-11 RX ADMIN — ACETAMINOPHEN 400 MILLIGRAM(S): 80 SOLUTION/ DROPS ORAL at 13:45

## 2025-01-11 RX ADMIN — Medication 2 MILLIGRAM(S): at 16:11

## 2025-01-11 RX ADMIN — ACETAMINOPHEN 975 MILLIGRAM(S): 80 SOLUTION/ DROPS ORAL at 21:52

## 2025-01-11 RX ADMIN — Medication 0.5 MILLIGRAM(S): at 19:36

## 2025-01-11 RX ADMIN — Medication 2 MILLIGRAM(S): at 17:54

## 2025-01-11 RX ADMIN — Medication 0.5 MILLIGRAM(S): at 20:26

## 2025-01-11 RX ADMIN — LIDOCAINE 1 PATCH: 50 OINTMENT TOPICAL at 17:55

## 2025-01-11 NOTE — H&P ADULT - PROBLEM SELECTOR PLAN 4
-Last echo August 2024 with EF 40%. -Follows with Dr. Grady outpatient.  -H/o mild CAD. C/w ASA and ezetimibe (has statin intolerance). On q2 weekly repatha.   -C/w home entresto.   -Will get echo with bubble study given chest wall pain and basilar artery occlusion. Trops negative.   -Telemetry.   -Strict I's/O's and daily weights.  -F/u cards recs (Dr. Lozano called). -Last echo August 2024 with EF 40%. -Follows with Dr. Grady outpatient.  -H/o mild CAD. C/w ASA and ezetimibe (has statin intolerance). On q2 weekly repatha.   -C/w home entresto and carvedilol.   -Was unable to tolerate spironolactone outpt.   -Will get echo with bubble study given chest wall pain and basilar artery occlusion. Trops negative. -BNP.   -Telemetry.   -Strict I's/O's and daily weights.  -F/u cards recs (Dr. Lozano called).
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
## 2025-01-11 NOTE — H&P ADULT - TRAUMA TEAM ALERT/CONSULT
PRIMARY DIAGNOSIS: Alcohol Withdrawl  OUTPATIENT/OBSERVATION GOALS TO BE MET BEFORE DISCHARGE:  ADLs back to baseline: No    Activity and level of assistance: Up with standby assistance.    Pain status: Pain free.    Return to near baseline physical activity: Yes     Discharge Planner Nurse   Safe discharge environment identified: No  Barriers to discharge: Yes       Entered by: Winnie Alejandro RN 08/03/2024 2:48 AM     Please review provider order for any additional goals.   Nurse to notify provider when observation goals have been met and patient is ready for discharge.    Assumed care [id number]. A&Ox4. SBA when OOB. On RA. On a regular diet. CIWA protocol continued. PIV in L arm is infusing. Denies pain/discomfort. Plan of care ongoing.     Level One Trauma Activation
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
## 2025-01-11 NOTE — H&P ADULT - PROBLEM SELECTOR PLAN 3
-CT chest addendum states: Question a trace right apical pneumothorax, versus mild right apical   paraseptal emphysematous changes.   -Discussed with trauma surgery. They request repeat CXR in am (ordered).   -Supplemental O2 prn.   -Incentive spirometer. -CT chest addendum states: Question a trace right apical pneumothorax, versus mild right apical paraseptal emphysematous changes.   -Discussed with trauma surgery. They request repeat CXR in am (ordered).   -Supplemental O2 prn.   -Incentive spirometer.  -No SOB reported.

## 2025-01-11 NOTE — H&P ADULT - NSICDXPASTMEDICALHX_GEN_ALL_CORE_FT
PAST MEDICAL HISTORY:  BPH (benign prostatic hyperplasia)     Chronic systolic congestive heart failure     DM (diabetes mellitus)     GERD (gastroesophageal reflux disease)     HLD (hyperlipidemia)     HTN (hypertension)     NICM (nonischemic cardiomyopathy)
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
## 2025-01-11 NOTE — H&P ADULT - NSICDXPASTSURGICALHX_GEN_ALL_CORE_FT
PAST SURGICAL HISTORY:  ICD (implantable cardioverter-defibrillator) in place     S/P lumbar spinal fusion
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
## 2025-01-11 NOTE — ED PROVIDER NOTE - PROGRESS NOTE DETAILS
[facility] radiology called, has basilar artery occlusion with distal recon, neurology consulted  radiologist also called regarding small apical right sided pneumothorax, pt on nasal canula. SG pt will need MRI given unclear if basilar artery occlusion is from trauma, unable to clear c spine still has midline tenderness, neurologically intact.
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
## 2025-01-11 NOTE — ED PROVIDER NOTE - PHYSICAL EXAMINATION
GENERAL: well appearing in no acute distress, non-toxic appearing  CARDIAC: regular rate and rhythm, normal S1S2, no appreciable murmurs, 2+ pulses in UE/LE b/l  PULM: normal breath sounds, clear to ascultation bilaterally, no rales, rhonchi, wheezing  GI: abdomen nondistended, soft, nontender, no guarding, rebound tenderness  : no CVA tenderness b/l, no suprapubic tenderness  NEURO: Moves all extremities spontaneously. symmetric  strength b/l UE, elbow flexion 5/5 bilaterally, elbow extension 5/5 bilaterally, patient can straight leg raise bilaterally and resist symmetricall  MSK: L clavicular tenderness, L chest wall tenderness, no midline spinal tenderness, able to range all four extremities w out pain  SKIN: no overlying chest pain

## 2025-01-11 NOTE — CHART NOTE - NSCHARTNOTEFT_GEN_A_CORE
Search Terms: jaye elliott, 1955Search Date: 01/11/2025 19:42:42 PM  The Drug Utilization Report below displays all of the controlled substance prescriptions, if any, that your patient has filled in the last twelve months. The information displayed on this report is compiled from pharmacy submissions to the Department, and accurately reflects the information as submitted by the pharmacies.    This report was requested by: Alan Irvin | Reference #: [id number]    There are no results for the search terms that you entered.

## 2025-01-11 NOTE — H&P ADULT - PROBLEM SELECTOR PLAN 2
-Neuro checks.   -Per neuro recs:  C/w Aspirin 81 mg   MRI brain wo if AICD compatible (ordered)  MRA H/N with T1 fat sat to rule out dissection (ordered)   MRA H with NOVA to assess intracranial collateral blood flow (ordered).   -Patient says his ICD is MRI compatible but an old lead is not. Will have to see what other options/recs neuro has. -Neuro checks. Dysphagia screen, aspiration, fall, seizure precautions. -PT eval.   -Per neuro recs:  C/w Aspirin 81 mg   MRI brain wo if AICD compatible (ordered)  MRA H/N with T1 fat sat to rule out dissection (ordered)   MRA H with NOVA to assess intracranial collateral blood flow (ordered).   -Patient says his ICD is MRI compatible but an old lead is not. Will have to see what other options/recs neuro has - messaged.  -Tele. TTE with bubble.
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
## 2025-01-11 NOTE — ED ADULT NURSE REASSESSMENT NOTE - NS ED NURSE REASSESS COMMENT FT1
Pt is endorsing chest pain, MD Irizarry made aware and requested a repeat EKG. Ancillary at bedside performing EKG.
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
## 2025-01-11 NOTE — H&P ADULT - NSHPLABSRESULTS_GEN_ALL_CORE
LABS:                          17.0   3.82  )-----------( 172      ( 11 Jan 2025 13:50 )             50.8       01-11    139  |  107  |  20  ----------------------------<  162[H]  4.2   |  19[L]  |  0.93    Ca    9.8      11 Jan 2025 13:50    TPro  7.1  /  Alb  4.0  /  TBili  0.3  /  DBili  x   /  AST  18  /  ALT  12  /  AlkPhos  53  01-11          CAPILLARY BLOOD GLUCOSE      PT/INR - ( 11 Jan 2025 13:50 )   PT: 10.9 sec;   INR: 0.95 ratio         PTT - ( 11 Jan 2025 13:50 )  PTT:29.2 sec    Lactate Trend  01-11 @ 15:29 Lactate:0.9       CARDIAC MARKERS ( 11 Jan 2025 15:29 )  x     / x     / x     / x     / 2.3 ng/mL      Urinalysis Basic - ( 11 Jan 2025 15:29 )    Color: Yellow / Appearance: Clear / SG: >1.030 / pH: x  Gluc: x / Ketone: Negative mg/dL  / Bili: Negative / Urobili: 0.2 mg/dL   Blood: x / Protein: Negative mg/dL / Nitrite: Negative   Leuk Esterase: Negative / RBC: x / WBC x   Sq Epi: x / Non Sq Epi: x / Bacteria: x    RADS:    < from: CT Angio Head w/ IV Cont (01.11.25 @ 14:45) >    IMPRESSION:    CT HEAD:  -No acute intracranial hemorrhage, mass effect, or midline shift.  -Extensive white matter hypoattenuating foci, nonspecific, but could   represent chronic small vessel changes. Consider MRI for further   evaluation.    CT CERVICAL SPINE:  -No acute fracture or traumatic subluxation.  -Multi-level degenerative changes.  -Trace air in the right lung apex representing trace pneumothorax, bleb,   or emphysema. Please see concurrent CT chest.    CTA NECK:  -Mild atherosclerotic plaque of the bilateral carotid bifurcations   without stenosis.  -Hypoplastic left vertebral artery.    CTA HEAD:  -Age-indeterminate mid basilar artery occlusion with reconstitution at   the level of the basilar tip secondary to dominant bilateral posterior   communicating arteries. MRI brain should be considered to rule out acute   infarct.    < end of copied text > LABS:                          17.0   3.82  )-----------( 172      ( 11 Jan 2025 13:50 )             50.8       01-11    139  |  107  |  20  ----------------------------<  162[H]  4.2   |  19[L]  |  0.93    Ca    9.8      11 Jan 2025 13:50    TPro  7.1  /  Alb  4.0  /  TBili  0.3  /  DBili  x   /  AST  18  /  ALT  12  /  AlkPhos  53  01-11          CAPILLARY BLOOD GLUCOSE      PT/INR - ( 11 Jan 2025 13:50 )   PT: 10.9 sec;   INR: 0.95 ratio         PTT - ( 11 Jan 2025 13:50 )  PTT:29.2 sec    Lactate Trend  01-11 @ 15:29 Lactate:0.9       CARDIAC MARKERS ( 11 Jan 2025 15:29 )  x     / x     / x     / x     / 2.3 ng/mL      Urinalysis Basic - ( 11 Jan 2025 15:29 )    Color: Yellow / Appearance: Clear / SG: >1.030 / pH: x  Gluc: x / Ketone: Negative mg/dL  / Bili: Negative / Urobili: 0.2 mg/dL   Blood: x / Protein: Negative mg/dL / Nitrite: Negative   Leuk Esterase: Negative / RBC: x / WBC x   Sq Epi: x / Non Sq Epi: x / Bacteria: x    RADS:    < from: CT Angio Head w/ IV Cont (01.11.25 @ 14:45) >    IMPRESSION:    CT HEAD:  -No acute intracranial hemorrhage, mass effect, or midline shift.  -Extensive white matter hypoattenuating foci, nonspecific, but could   represent chronic small vessel changes. Consider MRI for further   evaluation.    CT CERVICAL SPINE:  -No acute fracture or traumatic subluxation.  -Multi-level degenerative changes.  -Trace air in the right lung apex representing trace pneumothorax, bleb,   or emphysema. Please see concurrent CT chest.    CTA NECK:  -Mild atherosclerotic plaque of the bilateral carotid bifurcations   without stenosis.  -Hypoplastic left vertebral artery.    CTA HEAD:  -Age-indeterminate mid basilar artery occlusion with reconstitution at   the level of the basilar tip secondary to dominant bilateral posterior   communicating arteries. MRI brain should be considered to rule out acute   infarct.    < end of copied text >    < from: CT Abdomen and Pelvis w/ IV Cont (01.11.25 @ 14:38) >    IMPRESSION:  *  Minimal subcutaneous fat infiltration in the right anterior chest   wall, potentially representing contusion.  *  Otherwise, no evidence of an acute traumatic injury within the chest,   abdomen, or pelvis.  *  Indeterminate left adrenal nodule, which can be further evaluated by   abdominal MRI or CT with adrenal protocol.    < end of copied text >
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
## 2025-01-11 NOTE — H&P ADULT - PROBLEM SELECTOR PLAN 5
-Hold home farxiga while admitted. Hold home rybelsus -though patient may not have been taking diligently at home.   -BAILEY QAC/HS.   -CC DASH diet.   -A1c and lipid panel in am. -Hold home farxiga while admitted. Hold home rybelsus -though patient may not have been taking diligently at home.   -BAILEY QAC/HS.   -CC DASH diet.   -A1c and lipid panel in am.  -TSH.

## 2025-01-11 NOTE — H&P ADULT - NSHPSOCIALHISTORY_GEN_ALL_CORE
remote history of smoking. . has a daughter. remote history of occasional smoking. . has a daughter.
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
## 2025-01-11 NOTE — ED ADULT NURSE REASSESSMENT NOTE - NS ED NURSE REASSESS COMMENT FT1
[facility] room tele tech contacted regarding continuous pulse ox order. RN to be notified if SpO2 <92%.
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
## 2025-01-11 NOTE — H&P ADULT - PROBLEM SELECTOR PLAN 6
-Heparin sq for DVT PPx for now.     7. Discussed plan with JEFFREY Rios and patient and his wife at bedside. -Heparin sq for DVT PPx for now.   -C/w home PPI equivalent.   -UTox. Flu swab.    7. Discussed plan with JEFFREY Rios and patient and his wife at bedside.
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
## 2025-01-11 NOTE — ED ADULT NURSE REASSESSMENT NOTE - NS ED NURSE REASSESS COMMENT FT1
Pt reports no change in pain s/p medication administration, admitting TARAN Hinojosa contacted and made aware, awaiting further instructions

## 2025-01-11 NOTE — H&P ADULT - NSHPPHYSICALEXAM_GEN_ALL_CORE
Primary Survey:    A - airway intact  B - bilateral breath sounds and good chest rise  C - initial , palpable pulses in all extremities  D - GCS 14 on arrival. [id number]  Exposure obtained      Secondary Survey:    General: NAD  HEENT: Normocephalic, atraumatic, EOMI, PEERLA.  Neck: Soft, midline trachea, C-collar in place. midline neck tenderness   Chest: L clavicular tenderness and chest wall tenderness   Cardiac: S1, S2, RRR  Respiratory: Bilateral breath sounds, clear and equal bilaterally  Abdomen: Soft, non-distended, non-tender, no rebound, no guarding, no masses palpated  Pelvis: Stable, non-tender, no ecchymosis  Ext: palp radial b/l UE, b/l DP palp in Lower Extrem, motor and sensory grossly intact in all 4 extremities  Back: C spine tenderness, rectal tone intact
PHYSICAL EXAM:  Vital Signs Last 24 Hrs  T(C): 36.5 (01-11-25 @ 15:41)  T(F): 97.7 (01-11-25 @ 15:41), Max: 97.7 (01-11-25 @ 15:41)  HR: 72 (01-11-25 @ 17:17) (70 - 72)  BP: 157/89 (01-11-25 @ 17:17)  BP(mean): 109 (01-11-25 @ 16:35) (109 - 113)  RR: 18 (01-11-25 @ 17:17) (18 - 19)  SpO2: 97% (01-11-25 @ 17:17) (97% - 99%)  Wt(kg): --    Constitutional: NAD, awake and alert  EYES: EOMI  ENT:  Normal Hearing, no tonsillar exudates   Neck: Soft and supple, No JVD  Respiratory: Breath sounds are clear bilaterally, No wheezing, rales or rhonchi  Cardiovascular: S1 and S2, regular rate and rhythm, no Murmurs, gallops or rubs  Gastrointestinal: Bowel Sounds present, soft, nontender, nondistended, no guarding, no rebound  Extremities: No cyanosis or clubbing or edema; warm to touch  Vascular: 2+ peripheral pulses lower ex  Neurological: A/O x 3, no focal deficits  Musculoskeletal: 5/5 strength b/l upper and lower extremities; chest wall discomfort.   Skin: No rashes  Psych: No depression or anhedonia  Heme: No bruises, no nose bleeds

## 2025-01-12 LAB
24R-OH-CALCIDIOL SERPL-MCNC: 40.6 NG/ML — SIGNIFICANT CHANGE UP (ref 30–80)
A1C WITH ESTIMATED AVERAGE GLUCOSE RESULT: 7.3 % — HIGH (ref 4–5.6)
ALBUMIN SERPL ELPH-MCNC: 3.6 G/DL — SIGNIFICANT CHANGE UP (ref 3.3–5)
ALP SERPL-CCNC: 48 U/L — SIGNIFICANT CHANGE UP (ref 40–120)
ALT FLD-CCNC: 10 U/L — SIGNIFICANT CHANGE UP (ref 10–45)
AMPHET UR-MCNC: NEGATIVE — SIGNIFICANT CHANGE UP
ANION GAP SERPL CALC-SCNC: 13 MMOL/L — SIGNIFICANT CHANGE UP (ref 5–17)
AST SERPL-CCNC: 16 U/L — SIGNIFICANT CHANGE UP (ref 10–40)
BARBITURATES UR SCN-MCNC: NEGATIVE — SIGNIFICANT CHANGE UP
BENZODIAZ UR-MCNC: NEGATIVE — SIGNIFICANT CHANGE UP
BILIRUB SERPL-MCNC: 0.3 MG/DL — SIGNIFICANT CHANGE UP (ref 0.2–1.2)
BUN SERPL-MCNC: 22 MG/DL — SIGNIFICANT CHANGE UP (ref 7–23)
CALCIUM SERPL-MCNC: 9.5 MG/DL — SIGNIFICANT CHANGE UP (ref 8.4–10.5)
CHLORIDE SERPL-SCNC: 104 MMOL/L — SIGNIFICANT CHANGE UP (ref 96–108)
CHOLEST SERPL-MCNC: 157 MG/DL — SIGNIFICANT CHANGE UP
CO2 SERPL-SCNC: 22 MMOL/L — SIGNIFICANT CHANGE UP (ref 22–31)
COCAINE METAB.OTHER UR-MCNC: NEGATIVE — SIGNIFICANT CHANGE UP
CREAT SERPL-MCNC: 1.18 MG/DL — SIGNIFICANT CHANGE UP (ref 0.5–1.3)
EGFR: 66 ML/MIN/1.73M2 — SIGNIFICANT CHANGE UP
ESTIMATED AVERAGE GLUCOSE: 163 MG/DL — HIGH (ref 68–114)
FLUAV AG NPH QL: SIGNIFICANT CHANGE UP
FLUBV AG NPH QL: SIGNIFICANT CHANGE UP
GLUCOSE BLDC GLUCOMTR-MCNC: 114 MG/DL — HIGH (ref 70–99)
GLUCOSE BLDC GLUCOMTR-MCNC: 116 MG/DL — HIGH (ref 70–99)
GLUCOSE BLDC GLUCOMTR-MCNC: 125 MG/DL — HIGH (ref 70–99)
GLUCOSE BLDC GLUCOMTR-MCNC: 196 MG/DL — HIGH (ref 70–99)
GLUCOSE SERPL-MCNC: 206 MG/DL — HIGH (ref 70–99)
HCT VFR BLD CALC: 48.7 % — SIGNIFICANT CHANGE UP (ref 39–50)
HDLC SERPL-MCNC: 59 MG/DL — SIGNIFICANT CHANGE UP
HGB BLD-MCNC: 16.1 G/DL — SIGNIFICANT CHANGE UP (ref 13–17)
LIPID PNL WITH DIRECT LDL SERPL: 75 MG/DL — SIGNIFICANT CHANGE UP
MAGNESIUM SERPL-MCNC: 2.2 MG/DL — SIGNIFICANT CHANGE UP (ref 1.6–2.6)
MCHC RBC-ENTMCNC: 29.3 PG — SIGNIFICANT CHANGE UP (ref 27–34)
MCHC RBC-ENTMCNC: 33.1 G/DL — SIGNIFICANT CHANGE UP (ref 32–36)
MCV RBC AUTO: 88.5 FL — SIGNIFICANT CHANGE UP (ref 80–100)
METHADONE UR-MCNC: NEGATIVE — SIGNIFICANT CHANGE UP
NON HDL CHOLESTEROL: 98 MG/DL — SIGNIFICANT CHANGE UP
NRBC # BLD: 0 /100 WBCS — SIGNIFICANT CHANGE UP (ref 0–0)
OPIATES UR-MCNC: POSITIVE
OXYCODONE UR-MCNC: NEGATIVE — SIGNIFICANT CHANGE UP
PCP SPEC-MCNC: SIGNIFICANT CHANGE UP
PCP UR-MCNC: NEGATIVE — SIGNIFICANT CHANGE UP
PHOSPHATE SERPL-MCNC: 3.7 MG/DL — SIGNIFICANT CHANGE UP (ref 2.5–4.5)
PLATELET # BLD AUTO: 182 K/UL — SIGNIFICANT CHANGE UP (ref 150–400)
POTASSIUM SERPL-MCNC: 3.6 MMOL/L — SIGNIFICANT CHANGE UP (ref 3.5–5.3)
POTASSIUM SERPL-SCNC: 3.6 MMOL/L — SIGNIFICANT CHANGE UP (ref 3.5–5.3)
PROT SERPL-MCNC: 6.6 G/DL — SIGNIFICANT CHANGE UP (ref 6–8.3)
RBC # BLD: 5.5 M/UL — SIGNIFICANT CHANGE UP (ref 4.2–5.8)
RBC # FLD: 13.9 % — SIGNIFICANT CHANGE UP (ref 10.3–14.5)
RSV RNA NPH QL NAA+NON-PROBE: SIGNIFICANT CHANGE UP
SARS-COV-2 RNA SPEC QL NAA+PROBE: SIGNIFICANT CHANGE UP
SODIUM SERPL-SCNC: 139 MMOL/L — SIGNIFICANT CHANGE UP (ref 135–145)
THC UR QL: NEGATIVE — SIGNIFICANT CHANGE UP
TRIGL SERPL-MCNC: 136 MG/DL — SIGNIFICANT CHANGE UP
TSH SERPL-MCNC: 2.84 UIU/ML — SIGNIFICANT CHANGE UP (ref 0.27–4.2)
VIT B12 SERPL-MCNC: 249 PG/ML — SIGNIFICANT CHANGE UP (ref 232–1245)
WBC # BLD: 3.5 K/UL — LOW (ref 3.8–10.5)
WBC # FLD AUTO: 3.5 K/UL — LOW (ref 3.8–10.5)

## 2025-01-12 PROCEDURE — 99254 IP/OBS CNSLTJ NEW/EST MOD 60: CPT

## 2025-01-12 PROCEDURE — 99232 SBSQ HOSP IP/OBS MODERATE 35: CPT

## 2025-01-12 PROCEDURE — 93010 ELECTROCARDIOGRAM REPORT: CPT

## 2025-01-12 PROCEDURE — 71045 X-RAY EXAM CHEST 1 VIEW: CPT | Mod: 26

## 2025-01-12 RX ADMIN — HEPARIN SODIUM 5000 UNIT(S): 1000 INJECTION, SOLUTION INTRAVENOUS; SUBCUTANEOUS at 21:28

## 2025-01-12 RX ADMIN — SACUBITRIL AND VALSARTAN 1 TABLET(S): 24; 26 TABLET, FILM COATED ORAL at 05:07

## 2025-01-12 RX ADMIN — ACETAMINOPHEN 975 MILLIGRAM(S): 80 SOLUTION/ DROPS ORAL at 13:12

## 2025-01-12 RX ADMIN — Medication 0.5 MILLIGRAM(S): at 10:12

## 2025-01-12 RX ADMIN — ACETAMINOPHEN 975 MILLIGRAM(S): 80 SOLUTION/ DROPS ORAL at 06:00

## 2025-01-12 RX ADMIN — CARVEDILOL 25 MILLIGRAM(S): 25 TABLET, FILM COATED ORAL at 17:35

## 2025-01-12 RX ADMIN — ACETAMINOPHEN 975 MILLIGRAM(S): 80 SOLUTION/ DROPS ORAL at 21:58

## 2025-01-12 RX ADMIN — PANTOPRAZOLE 40 MILLIGRAM(S): 40 TABLET, DELAYED RELEASE ORAL at 05:07

## 2025-01-12 RX ADMIN — ACETAMINOPHEN 975 MILLIGRAM(S): 80 SOLUTION/ DROPS ORAL at 05:07

## 2025-01-12 RX ADMIN — EZETIMIBE 10 MILLIGRAM(S): 10 TABLET ORAL at 13:12

## 2025-01-12 RX ADMIN — HEPARIN SODIUM 5000 UNIT(S): 1000 INJECTION, SOLUTION INTRAVENOUS; SUBCUTANEOUS at 05:07

## 2025-01-12 RX ADMIN — LIDOCAINE 1 PATCH: 50 OINTMENT TOPICAL at 19:00

## 2025-01-12 RX ADMIN — Medication 0.5 MILLIGRAM(S): at 09:42

## 2025-01-12 RX ADMIN — LIDOCAINE 1 PATCH: 50 OINTMENT TOPICAL at 13:11

## 2025-01-12 RX ADMIN — Medication 0.5 MILLIGRAM(S): at 00:18

## 2025-01-12 RX ADMIN — ACETAMINOPHEN 975 MILLIGRAM(S): 80 SOLUTION/ DROPS ORAL at 13:42

## 2025-01-12 RX ADMIN — LIDOCAINE 1 PATCH: 50 OINTMENT TOPICAL at 04:59

## 2025-01-12 RX ADMIN — ACETAMINOPHEN 975 MILLIGRAM(S): 80 SOLUTION/ DROPS ORAL at 21:28

## 2025-01-12 RX ADMIN — Medication 500 MILLIGRAM(S): at 13:12

## 2025-01-12 RX ADMIN — CARVEDILOL 25 MILLIGRAM(S): 25 TABLET, FILM COATED ORAL at 05:07

## 2025-01-12 RX ADMIN — Medication 1000 UNIT(S): at 13:12

## 2025-01-12 RX ADMIN — Medication 81 MILLIGRAM(S): at 13:22

## 2025-01-12 RX ADMIN — SACUBITRIL AND VALSARTAN 1 TABLET(S): 24; 26 TABLET, FILM COATED ORAL at 17:35

## 2025-01-12 RX ADMIN — Medication 0.5 MILLIGRAM(S): at 01:00

## 2025-01-12 RX ADMIN — Medication 3 MILLIGRAM(S): at 21:29

## 2025-01-12 RX ADMIN — HEPARIN SODIUM 5000 UNIT(S): 1000 INJECTION, SOLUTION INTRAVENOUS; SUBCUTANEOUS at 13:12
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
## 2025-01-12 NOTE — PHYSICAL THERAPY INITIAL EVALUATION ADULT - NSACTIVITYREC_GEN_A_PT
Pt demonstrates safe and  independent functional mobility. No acute restorative PT services recommended this time. HEP rev'd.
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
## 2025-01-12 NOTE — PHYSICAL THERAPY INITIAL EVALUATION ADULT - ADDITIONAL COMMENTS
Patient lives in pvt house with family 5  steps to enter. ~10 steps inside.  Patient ambulated without AD independent. occasionally uses standard cane.
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
## 2025-01-13 ENCOUNTER — RESULT REVIEW (OUTPATIENT)
Age: 70
End: 2025-01-13

## 2025-01-13 ENCOUNTER — TRANSCRIPTION ENCOUNTER (OUTPATIENT)
Age: 70
End: 2025-01-13

## 2025-01-13 VITALS
HEART RATE: 76 BPM | OXYGEN SATURATION: 96 % | RESPIRATION RATE: 18 BRPM | SYSTOLIC BLOOD PRESSURE: 156 MMHG | TEMPERATURE: 98 F | DIASTOLIC BLOOD PRESSURE: 82 MMHG

## 2025-01-13 LAB
ANION GAP SERPL CALC-SCNC: 13 MMOL/L — SIGNIFICANT CHANGE UP (ref 5–17)
BUN SERPL-MCNC: 26 MG/DL — HIGH (ref 7–23)
CALCIUM SERPL-MCNC: 9 MG/DL — SIGNIFICANT CHANGE UP (ref 8.4–10.5)
CHLORIDE SERPL-SCNC: 107 MMOL/L — SIGNIFICANT CHANGE UP (ref 96–108)
CO2 SERPL-SCNC: 21 MMOL/L — LOW (ref 22–31)
CREAT SERPL-MCNC: 1.06 MG/DL — SIGNIFICANT CHANGE UP (ref 0.5–1.3)
EGFR: 76 ML/MIN/1.73M2 — SIGNIFICANT CHANGE UP
GLUCOSE BLDC GLUCOMTR-MCNC: 120 MG/DL — HIGH (ref 70–99)
GLUCOSE BLDC GLUCOMTR-MCNC: 122 MG/DL — HIGH (ref 70–99)
GLUCOSE BLDC GLUCOMTR-MCNC: 126 MG/DL — HIGH (ref 70–99)
GLUCOSE SERPL-MCNC: 111 MG/DL — HIGH (ref 70–99)
HCT VFR BLD CALC: 49.3 % — SIGNIFICANT CHANGE UP (ref 39–50)
HGB BLD-MCNC: 16 G/DL — SIGNIFICANT CHANGE UP (ref 13–17)
MAGNESIUM SERPL-MCNC: 1.9 MG/DL — SIGNIFICANT CHANGE UP (ref 1.6–2.6)
MCHC RBC-ENTMCNC: 28.9 PG — SIGNIFICANT CHANGE UP (ref 27–34)
MCHC RBC-ENTMCNC: 32.5 G/DL — SIGNIFICANT CHANGE UP (ref 32–36)
MCV RBC AUTO: 89 FL — SIGNIFICANT CHANGE UP (ref 80–100)
NRBC # BLD: 0 /100 WBCS — SIGNIFICANT CHANGE UP (ref 0–0)
PHOSPHATE SERPL-MCNC: 2.8 MG/DL — SIGNIFICANT CHANGE UP (ref 2.5–4.5)
PLATELET # BLD AUTO: 158 K/UL — SIGNIFICANT CHANGE UP (ref 150–400)
POTASSIUM SERPL-MCNC: 3.8 MMOL/L — SIGNIFICANT CHANGE UP (ref 3.5–5.3)
POTASSIUM SERPL-SCNC: 3.8 MMOL/L — SIGNIFICANT CHANGE UP (ref 3.5–5.3)
RBC # BLD: 5.54 M/UL — SIGNIFICANT CHANGE UP (ref 4.2–5.8)
RBC # FLD: 13.8 % — SIGNIFICANT CHANGE UP (ref 10.3–14.5)
SODIUM SERPL-SCNC: 141 MMOL/L — SIGNIFICANT CHANGE UP (ref 135–145)
WBC # BLD: 2.98 K/UL — LOW (ref 3.8–10.5)
WBC # FLD AUTO: 2.98 K/UL — LOW (ref 3.8–10.5)

## 2025-01-13 PROCEDURE — 86900 BLOOD TYPING SEROLOGIC ABO: CPT

## 2025-01-13 PROCEDURE — C8929: CPT

## 2025-01-13 PROCEDURE — 82947 ASSAY GLUCOSE BLOOD QUANT: CPT

## 2025-01-13 PROCEDURE — 84295 ASSAY OF SERUM SODIUM: CPT

## 2025-01-13 PROCEDURE — 70496 CT ANGIOGRAPHY HEAD: CPT | Mod: MC

## 2025-01-13 PROCEDURE — 85730 THROMBOPLASTIN TIME PARTIAL: CPT

## 2025-01-13 PROCEDURE — 84132 ASSAY OF SERUM POTASSIUM: CPT

## 2025-01-13 PROCEDURE — 85025 COMPLETE CBC W/AUTO DIFF WBC: CPT

## 2025-01-13 PROCEDURE — 85027 COMPLETE CBC AUTOMATED: CPT

## 2025-01-13 PROCEDURE — 80307 DRUG TEST PRSMV CHEM ANLYZR: CPT

## 2025-01-13 PROCEDURE — 72125 CT NECK SPINE W/O DYE: CPT | Mod: MC

## 2025-01-13 PROCEDURE — 82550 ASSAY OF CK (CPK): CPT

## 2025-01-13 PROCEDURE — 71260 CT THORAX DX C+: CPT | Mod: MC

## 2025-01-13 PROCEDURE — 70498 CT ANGIOGRAPHY NECK: CPT | Mod: MC

## 2025-01-13 PROCEDURE — 85610 PROTHROMBIN TIME: CPT

## 2025-01-13 PROCEDURE — 71045 X-RAY EXAM CHEST 1 VIEW: CPT

## 2025-01-13 PROCEDURE — 99291 CRITICAL CARE FIRST HOUR: CPT

## 2025-01-13 PROCEDURE — 82553 CREATINE MB FRACTION: CPT

## 2025-01-13 PROCEDURE — 74177 CT ABD & PELVIS W/CONTRAST: CPT | Mod: MC

## 2025-01-13 PROCEDURE — 97161 PT EVAL LOW COMPLEX 20 MIN: CPT

## 2025-01-13 PROCEDURE — 81003 URINALYSIS AUTO W/O SCOPE: CPT

## 2025-01-13 PROCEDURE — 86850 RBC ANTIBODY SCREEN: CPT

## 2025-01-13 PROCEDURE — 82962 GLUCOSE BLOOD TEST: CPT

## 2025-01-13 PROCEDURE — 80048 BASIC METABOLIC PNL TOTAL CA: CPT

## 2025-01-13 PROCEDURE — 93005 ELECTROCARDIOGRAM TRACING: CPT

## 2025-01-13 PROCEDURE — 82330 ASSAY OF CALCIUM: CPT

## 2025-01-13 PROCEDURE — 83880 ASSAY OF NATRIURETIC PEPTIDE: CPT

## 2025-01-13 PROCEDURE — 82803 BLOOD GASES ANY COMBINATION: CPT

## 2025-01-13 PROCEDURE — 99239 HOSP IP/OBS DSCHRG MGMT >30: CPT

## 2025-01-13 PROCEDURE — 70450 CT HEAD/BRAIN W/O DYE: CPT | Mod: MC

## 2025-01-13 PROCEDURE — 82435 ASSAY OF BLOOD CHLORIDE: CPT

## 2025-01-13 PROCEDURE — 93306 TTE W/DOPPLER COMPLETE: CPT | Mod: 26

## 2025-01-13 PROCEDURE — 96374 THER/PROPH/DIAG INJ IV PUSH: CPT

## 2025-01-13 PROCEDURE — 83690 ASSAY OF LIPASE: CPT

## 2025-01-13 PROCEDURE — 83605 ASSAY OF LACTIC ACID: CPT

## 2025-01-13 PROCEDURE — 83735 ASSAY OF MAGNESIUM: CPT

## 2025-01-13 PROCEDURE — 84100 ASSAY OF PHOSPHORUS: CPT

## 2025-01-13 PROCEDURE — 86901 BLOOD TYPING SEROLOGIC RH(D): CPT

## 2025-01-13 PROCEDURE — 80053 COMPREHEN METABOLIC PANEL: CPT

## 2025-01-13 PROCEDURE — 85018 HEMOGLOBIN: CPT

## 2025-01-13 PROCEDURE — 85014 HEMATOCRIT: CPT

## 2025-01-13 PROCEDURE — 84484 ASSAY OF TROPONIN QUANT: CPT

## 2025-01-13 PROCEDURE — 96375 TX/PRO/DX INJ NEW DRUG ADDON: CPT

## 2025-01-13 RX ORDER — LIDOCAINE 50 MG/G
1 OINTMENT TOPICAL EVERY 24 HOURS
Refills: 0 | Status: DISCONTINUED | OUTPATIENT
Start: 2025-01-13 | End: 2025-01-13

## 2025-01-13 RX ORDER — ACETAMINOPHEN 80 MG/.8ML
3 SOLUTION/ DROPS ORAL
Qty: 0 | Refills: 0 | DISCHARGE
Start: 2025-01-13

## 2025-01-13 RX ADMIN — LIDOCAINE 1 PATCH: 50 OINTMENT TOPICAL at 01:15

## 2025-01-13 RX ADMIN — SACUBITRIL AND VALSARTAN 1 TABLET(S): 24; 26 TABLET, FILM COATED ORAL at 17:10

## 2025-01-13 RX ADMIN — ACETAMINOPHEN 975 MILLIGRAM(S): 80 SOLUTION/ DROPS ORAL at 05:50

## 2025-01-13 RX ADMIN — Medication 81 MILLIGRAM(S): at 13:01

## 2025-01-13 RX ADMIN — CARVEDILOL 25 MILLIGRAM(S): 25 TABLET, FILM COATED ORAL at 05:20

## 2025-01-13 RX ADMIN — CARVEDILOL 25 MILLIGRAM(S): 25 TABLET, FILM COATED ORAL at 17:10

## 2025-01-13 RX ADMIN — ACETAMINOPHEN 975 MILLIGRAM(S): 80 SOLUTION/ DROPS ORAL at 05:20

## 2025-01-13 RX ADMIN — Medication 500 MILLIGRAM(S): at 13:01

## 2025-01-13 RX ADMIN — HEPARIN SODIUM 5000 UNIT(S): 1000 INJECTION, SOLUTION INTRAVENOUS; SUBCUTANEOUS at 05:20

## 2025-01-13 RX ADMIN — PANTOPRAZOLE 40 MILLIGRAM(S): 40 TABLET, DELAYED RELEASE ORAL at 05:20

## 2025-01-13 RX ADMIN — Medication 1000 UNIT(S): at 13:01

## 2025-01-13 RX ADMIN — HEPARIN SODIUM 5000 UNIT(S): 1000 INJECTION, SOLUTION INTRAVENOUS; SUBCUTANEOUS at 13:01

## 2025-01-13 RX ADMIN — SACUBITRIL AND VALSARTAN 1 TABLET(S): 24; 26 TABLET, FILM COATED ORAL at 05:20

## 2025-01-13 RX ADMIN — ACETAMINOPHEN 975 MILLIGRAM(S): 80 SOLUTION/ DROPS ORAL at 14:00

## 2025-01-13 RX ADMIN — LIDOCAINE 1 PATCH: 50 OINTMENT TOPICAL at 13:01

## 2025-01-13 RX ADMIN — ACETAMINOPHEN 975 MILLIGRAM(S): 80 SOLUTION/ DROPS ORAL at 13:02

## 2025-01-13 RX ADMIN — EZETIMIBE 10 MILLIGRAM(S): 10 TABLET ORAL at 13:01

## 2025-01-13 NOTE — PROGRESS NOTE ADULT - PROBLEM SELECTOR PLAN 4
-Last echo August 2024 with EF 40%. -Follows with Dr. Grady outpatient.  -H/o mild CAD. C/w ASA and ezetimibe (has statin intolerance). On q2 weekly repatha.   -C/w home entresto and carvedilol.   -Was unable to tolerate spironolactone outpt.   -Will get echo with bubble study given chest wall pain and basilar artery occlusion. Trops negative. -BNP.   -Telemetry.   -Strict I's/O's and daily weights.  -F/u cards recs
-Last echo August 2024 with EF 40%. -Follows with Dr. Grady outpatient.  -H/o mild CAD. C/w ASA and ezetimibe (has statin intolerance). On q2 weekly repatha.   -C/w home entresto and carvedilol.   -Was unable to tolerate spironolactone outpt.   -Will get echo with bubble study given chest wall pain and basilar artery occlusion. Trops negative. -BNP.   -Telemetry.   -Strict I's/O's and daily weights.  -F/u cards recs

## 2025-01-13 NOTE — DISCHARGE NOTE NURSING/CASE MANAGEMENT/SOCIAL WORK - PATIENT PORTAL LINK FT
You can access the FollowMyHealth Patient Portal offered by [facility] by registering at the following website: [email or web address]. By joining [facility]’s FollowMyHealth portal, you will also be able to view your health information using other applications (apps) compatible with our system.
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
## 2025-01-13 NOTE — PROGRESS NOTE ADULT - PROBLEM SELECTOR PLAN 2
-Neuro checks. Dysphagia screen, aspiration, fall, seizure precautions. -PT eval.   -Per neuro recs:  C/w Aspirin 81 mg   MRI brain wo if AICD compatible (ordered)  MRA H/N with T1 fat sat to rule out dissection (ordered)   MRA H with NOVA to assess intracranial collateral blood flow (ordered).   -Patient says his ICD is MRI compatible but an old lead is not. Neuro updated, no further inpatient testing , outpatient neuro follow up  - TTE with bubble.
-Neuro checks. Dysphagia screen, aspiration, fall, seizure precautions. -PT eval.   -Per neuro recs:  C/w Aspirin 81 mg   MRI brain wo if AICD compatible (ordered)  MRA H/N with T1 fat sat to rule out dissection (ordered)   MRA H with NOVA to assess intracranial collateral blood flow (ordered).   -Patient says his ICD is MRI compatible but an old lead is not. Will have to see what other options/recs neuro has - monitor on tele for now  - TTE with bubble.

## 2025-01-13 NOTE — DISCHARGE NOTE PROVIDER - CARE PROVIDER_API CALL
Terry Grady  Cardiology  [address], [address]  [place] [address]  Phone: [phone]  Fax: [phone]  Follow Up Time:

## 2025-01-13 NOTE — DISCHARGE NOTE PROVIDER - NSDCMRMEDTOKEN_GEN_ALL_CORE_FT
ascorbic acid: 1 tab(s) orally once a day  aspirin 81 mg oral tablet: 1 orally once a day  carvedilol 25 mg oral tablet: 1 tab(s) orally 2 times a day  cholecalciferol: 1 cap(s) orally once a day  Dexilant 60 mg oral delayed release capsule: 1 cap(s) orally once a day  diclofenac topical: Apply topically to affected area 3 times a day as needed for  moderate pain  ezetimibe 10 mg oral tablet: 1 tab(s) orally once a day  Farxiga 10 mg oral tablet: 1 tab(s) orally once a day  meloxicam 15 mg oral tablet: 1 tab(s) orally once a day  Repatha 140 mg/mL subcutaneous solution: 140 milligram(s) subcutaneously every 2 weeks  Rybelsus 3 mg oral tablet: 1 tab(s) orally once a day (at bedtime)  sacubitril-valsartan 97 mg-103 mg oral tablet: 1 tab(s) orally 2 times a day  sildenafil 100 mg oral tablet: 1 tab(s) orally once a day as needed for erectile dysfunction  tadalafil 20 mg oral tablet: 1 tab(s) orally once a day as needed for erectile dysfunction   acetaminophen 325 mg oral tablet: 3 tab(s) orally every 8 hours Take as needed after 5 days  ascorbic acid: 1 tab(s) orally once a day  aspirin 81 mg oral tablet: 1 orally once a day  carvedilol 25 mg oral tablet: 1 tab(s) orally 2 times a day  cholecalciferol: 1 cap(s) orally once a day  Dexilant 60 mg oral delayed release capsule: 1 cap(s) orally once a day  diclofenac topical: Apply topically to affected area 3 times a day as needed for  moderate pain  ezetimibe 10 mg oral tablet: 1 tab(s) orally once a day  Farxiga 10 mg oral tablet: 1 tab(s) orally once a day  meloxicam 15 mg oral tablet: 1 tab(s) orally once a day  Repatha 140 mg/mL subcutaneous solution: 140 milligram(s) subcutaneously every 2 weeks  Rybelsus 3 mg oral tablet: 1 tab(s) orally once a day (at bedtime)  sacubitril-valsartan 97 mg-103 mg oral tablet: 1 tab(s) orally 2 times a day  sildenafil 100 mg oral tablet: 1 tab(s) orally once a day as needed for erectile dysfunction  tadalafil 20 mg oral tablet: 1 tab(s) orally once a day as needed for erectile dysfunction
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
## 2025-01-13 NOTE — DISCHARGE NOTE PROVIDER - ATTENDING DISCHARGE PHYSICAL EXAMINATION:
CONSTITUTIONAL: NAD  EYES: EOMI  ENMT: Moist oral mucosa  NECK: Supple  RESPIRATORY: Normal respiratory effort; lungs are clear to auscultation bilaterally  CARDIOVASCULAR: Regular rate and rhythm, no murmurs, no LE edema  ABDOMEN: Nontender to palpation, normoactive bowel sounds, no rebound/guarding  MUSCULOSKELETAL:  no joint swelling or tenderness to palpation  PSYCH: A+O to person, place, and time; affect appropriate  NEUROLOGY: sensation and strength grossly intact  SKIN: No rashes
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
## 2025-01-13 NOTE — DISCHARGE NOTE NURSING/CASE MANAGEMENT/SOCIAL WORK - NSDCPEPT PROEDMA_GEN_ALL_CORE
Pt requesting coffee. Let pt know she can have some water until lunch arrives. Pt agitated, left room and ran around towards charge desk. Pt escorted back to room. Pt given water cup, continues to be agitated. Again slammed door on this RN.     Pt offered hot meal as lunch has not arrived yet. Pt accepted.    Yes

## 2025-01-13 NOTE — PROGRESS NOTE ADULT - PROBLEM SELECTOR PLAN 5
-Hold home farxiga while admitted. Hold home rybelsus -though patient may not have been taking diligently at home.   -BAILEY QAC/HS.   -CC DASH diet.   - Lipid acceptable. TSH wnl.  - A1c 7.3
-Hold home farxiga while admitted. Hold home rybelsus -though patient may not have been taking diligently at home.   -BAILEY QAC/HS.   -CC DASH diet.   - f/u A1c and lipid panel   - f/u TSH

## 2025-01-13 NOTE — DISCHARGE NOTE NURSING/CASE MANAGEMENT/SOCIAL WORK - NSDCPEFALRISK_GEN_ALL_CORE
For information on Fall & Injury Prevention, visit: [email or web address] OR  [email or web address] OR  https://www.cdc.gov/steadi/patient.html

## 2025-01-13 NOTE — PROGRESS NOTE ADULT - SUBJECTIVE AND OBJECTIVE BOX
[facility] Division of Hospital Medicine  Kavita Justin MD  Available via MS Teams    SUBJECTIVE / OVERNIGHT EVENTS:  C/o pain to back of neck, across chest.    MEDICATIONS  (STANDING):  acetaminophen     Tablet .. 975 milliGRAM(s) Oral every 8 hours  ascorbic acid 500 milliGRAM(s) Oral daily  aspirin enteric coated 81 milliGRAM(s) Oral daily  carvedilol 25 milliGRAM(s) Oral every 12 hours  cholecalciferol 1000 Unit(s) Oral daily  dextrose 5%. 1000 milliLiter(s) (50 mL/Hr) IV Continuous <Continuous>  dextrose 5%. 1000 milliLiter(s) (100 mL/Hr) IV Continuous <Continuous>  dextrose 50% Injectable 25 Gram(s) IV Push once  dextrose 50% Injectable 12.5 Gram(s) IV Push once  dextrose 50% Injectable 25 Gram(s) IV Push once  ezetimibe 10 milliGRAM(s) Oral daily  glucagon  Injectable 1 milliGRAM(s) IntraMuscular once  heparin   Injectable 5000 Unit(s) SubCutaneous three times a day  insulin lispro (ADMELOG) corrective regimen sliding scale   SubCutaneous three times a day before meals  insulin lispro (ADMELOG) corrective regimen sliding scale   SubCutaneous at bedtime  lidocaine   4% Patch 1 Patch Transdermal every 24 hours  lidocaine   4% Patch 1 Patch Transdermal daily  pantoprazole    Tablet 40 milliGRAM(s) Oral before breakfast  sacubitril 97 mG/valsartan 103 mG 1 Tablet(s) Oral two times a day    MEDICATIONS  (PRN):  aluminum hydroxide/magnesium hydroxide/simethicone Suspension 30 milliLiter(s) Oral every 4 hours PRN Dyspepsia  dextrose Oral Gel 15 Gram(s) Oral once PRN Blood Glucose LESS THAN 70 milliGRAM(s)/deciliter  HYDROmorphone  Injectable 0.5 milliGRAM(s) IV Push every 4 hours PRN Severe Pain (7 - 10)  melatonin 3 milliGRAM(s) Oral at bedtime PRN Insomnia  ondansetron Injectable 4 milliGRAM(s) IV Push every 8 hours PRN Nausea and/or Vomiting  oxyCODONE    IR 5 milliGRAM(s) Oral every 6 hours PRN Moderate Pain (4 - 6)      I&O's Summary    12 Jan 2025 07:01  -  13 Jan 2025 07:00  --------------------------------------------------------  IN: 480 mL / OUT: 200 mL / NET: 280 mL    13 Jan 2025 07:01  -  13 Jan 2025 13:31  --------------------------------------------------------  IN: 100 mL / OUT: 0 mL / NET: 100 mL        PHYSICAL EXAM:  Vital Signs Last 24 Hrs  T(C): 36.8 (13 Jan 2025 11:21), Max: 37 (12 Jan 2025 16:36)  T(F): 98.3 (13 Jan 2025 11:21), Max: 98.6 (12 Jan 2025 16:36)  HR: 70 (13 Jan 2025 11:21) (70 - 81)  BP: 160/84 (13 Jan 2025 11:21) (108/65 - 160/84)  BP(mean): --  RR: 18 (13 Jan 2025 11:21) (18 - 18)  SpO2: 97% (13 Jan 2025 11:21) (94% - 97%)    Parameters below as of 13 Jan 2025 11:21  Patient On (Oxygen Delivery Method): room air      CONSTITUTIONAL: NAD  EYES: EOMI  ENMT: Moist oral mucosa  NECK: Supple  RESPIRATORY: Normal respiratory effort; lungs are clear to auscultation bilaterally  CARDIOVASCULAR: Regular rate and rhythm, no murmurs, no LE edema  ABDOMEN: Nontender to palpation, normoactive bowel sounds, no rebound/guarding  MUSCULOSKELETAL:  no joint swelling or tenderness to palpation  PSYCH: A+O to person, place, and time; affect appropriate  NEUROLOGY: sensation and strength grossly intact  SKIN: No rashes    LABS:                        16.0   2.98  )-----------( 158      ( 13 Jan 2025 06:06 )             49.3     01-13    141  |  107  |  26[H]  ----------------------------<  111[H]  3.8   |  21[L]  |  1.06    Ca    9.0      13 Jan 2025 06:06  Phos  2.8     01-13  Mg     1.9     01-13    TPro  6.6  /  Alb  3.6  /  TBili  0.3  /  DBili  x   /  AST  16  /  ALT  10  /  AlkPhos  48  01-12    PT/INR - ( 11 Jan 2025 13:50 )   PT: 10.9 sec;   INR: 0.95 ratio         PTT - ( 11 Jan 2025 13:50 )  PTT:29.2 sec  CARDIAC MARKERS ( 11 Jan 2025 15:29 )  x     / x     / x     / x     / 2.3 ng/mL      Urinalysis Basic - ( 13 Jan 2025 06:06 )    Color: x / Appearance: x / SG: x / pH: x  Gluc: 111 mg/dL / Ketone: x  / Bili: x / Urobili: x   Blood: x / Protein: x / Nitrite: x   Leuk Esterase: x / RBC: x / WBC x   Sq Epi: x / Non Sq Epi: x / Bacteria: x            RADIOLOGY & ADDITIONAL TESTS:  New Imaging Personally Reviewed Today:  New Electrocardiogram Personally Reviewed Today:  Other Results Reviewed Today:   Prior or Outpatient Records Reviewed Today with Summary:    COORDINATION OF CARE:  Consultant Communication and Details of Discussion (where applicable):
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
## 2025-01-13 NOTE — PROGRESS NOTE ADULT - PROBLEM/PLAN-4
-Clean all wounds with normal saline and apply skin prep to the surrounding skin  -Apply a non-adherent dry dressing to the R. Heel wound Daily PRN  -Apply a Foam dressing to the Coccyx area Q 72hrs PRN  -Elevate/float the patients heels using heel protectors and reposition the patient Q 2hrs using wedges or pillows
DISPLAY PLAN FREE TEXT
DISPLAY PLAN FREE TEXT
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
## 2025-01-13 NOTE — PROGRESS NOTE ADULT - PROBLEM SELECTOR PLAN 6
-Heparin sq for DVT PPx for now.   -C/w home PPI equivalent.   PT: NPT    DISPO: pending CXR read, TTE. Home no needs
-Heparin sq for DVT PPx for now.   -C/w home PPI equivalent.   PT: NPT    DISPO: pending final neuro and cardio recs; MRI if able to get; ECHO

## 2025-01-13 NOTE — DISCHARGE NOTE NURSING/CASE MANAGEMENT/SOCIAL WORK - FINANCIAL ASSISTANCE
[facility] provides services at a reduced cost to those who are determined to be eligible through [facility]’s financial assistance program. Information regarding [facility]’s financial assistance program can be found by going to [email or web address] or by calling [phone].
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
## 2025-01-13 NOTE — DISCHARGE NOTE PROVIDER - NSDCFUADDAPPT_GEN_ALL_CORE_FT
Follow up with primary care provider    APPTS ARE READY TO BE MADE: [x] YES    Best Family or Patient Contact (if needed):    Additional Information about above appointments (if needed):    1:   2:   3:     Other comments or requests:

## 2025-01-15 ENCOUNTER — RX RENEWAL (OUTPATIENT)
Age: 70
End: 2025-01-15

## 2025-01-16 ENCOUNTER — RX CHANGE (OUTPATIENT)
Age: 70
End: 2025-01-16

## 2025-01-17 RX ORDER — TADALAFIL 5 MG/1
1 TABLET, COATED ORAL
Refills: 0 | DISCHARGE

## 2025-01-17 RX ORDER — CARVEDILOL 25 MG/1
1 TABLET, FILM COATED ORAL
Refills: 0 | DISCHARGE

## 2025-01-17 RX ORDER — ASCORBIC ACID 1000 MG
1 TABLET ORAL
Refills: 0 | DISCHARGE

## 2025-01-17 RX ORDER — ORAL SEMAGLUTIDE 3 MG/1
1 TABLET ORAL
Refills: 0 | DISCHARGE

## 2025-01-17 RX ORDER — DEXLANSOPRAZOLE 30 MG/1
1 CAPSULE, DELAYED RELEASE PELLETS ORAL
Refills: 0 | DISCHARGE

## 2025-01-17 RX ORDER — SACUBITRIL AND VALSARTAN 24; 26 MG/1; MG/1
1 TABLET, FILM COATED ORAL
Refills: 0 | DISCHARGE

## 2025-01-17 RX ORDER — CHOLECALCIFEROL (VITAMIN D3) 10 MCG
1 TABLET ORAL
Refills: 0 | DISCHARGE

## 2025-01-17 RX ORDER — ASPIRIN 81 MG
1 TABLET, DELAYED RELEASE (ENTERIC COATED) ORAL
Refills: 0 | DISCHARGE

## 2025-01-17 RX ORDER — EVOLOCUMAB 140 MG/ML
140 INJECTION, SOLUTION SUBCUTANEOUS
Refills: 0 | DISCHARGE

## 2025-01-17 RX ORDER — DAPAGLIFLOZIN 5 MG/1
1 TABLET, FILM COATED ORAL
Refills: 0 | DISCHARGE

## 2025-01-17 RX ORDER — MELOXICAM 15 MG
1 TABLET ORAL
Refills: 0 | DISCHARGE

## 2025-01-17 RX ORDER — DICLOFENAC SODIUM 1 %
1 GEL (GRAM) TOPICAL
Refills: 0 | DISCHARGE

## 2025-01-17 RX ORDER — EZETIMIBE 10 MG/1
1 TABLET ORAL
Refills: 0 | DISCHARGE

## 2025-01-17 RX ORDER — SILDENAFIL 100 MG/1
1 TABLET, FILM COATED ORAL
Refills: 0 | DISCHARGE

## 2025-01-22 ENCOUNTER — APPOINTMENT (OUTPATIENT)
Dept: INTERNAL MEDICINE | Facility: CLINIC | Age: 70
End: 2025-01-22

## 2025-01-24 ENCOUNTER — APPOINTMENT (OUTPATIENT)
Dept: ELECTROPHYSIOLOGY | Facility: CLINIC | Age: 70
End: 2025-01-24

## 2025-01-24 PROCEDURE — 93296 REM INTERROG EVL PM/IDS: CPT

## 2025-01-24 PROCEDURE — 93295 DEV INTERROG REMOTE 1/2/MLT: CPT

## 2025-01-24 RX ORDER — EZETIMIBE 10 MG/1
10 TABLET ORAL
Qty: 90 | Refills: 1 | Status: ACTIVE | COMMUNITY
Start: 1900-01-01 | End: 1900-01-01

## 2025-01-27 PROBLEM — K21.9 GASTRO-ESOPHAGEAL REFLUX DISEASE WITHOUT ESOPHAGITIS: Chronic | Status: ACTIVE | Noted: 2025-01-11

## 2025-01-27 PROBLEM — E78.5 HYPERLIPIDEMIA, UNSPECIFIED: Chronic | Status: ACTIVE | Noted: 2025-01-11

## 2025-01-27 PROBLEM — E11.9 TYPE 2 DIABETES MELLITUS WITHOUT COMPLICATIONS: Chronic | Status: ACTIVE | Noted: 2025-01-11

## 2025-01-27 PROBLEM — I10 ESSENTIAL (PRIMARY) HYPERTENSION: Chronic | Status: ACTIVE | Noted: 2025-01-11

## 2025-01-27 PROBLEM — I50.22 CHRONIC SYSTOLIC (CONGESTIVE) HEART FAILURE: Chronic | Status: ACTIVE | Noted: 2025-01-11

## 2025-01-27 PROBLEM — N40.0 BENIGN PROSTATIC HYPERPLASIA WITHOUT LOWER URINARY TRACT SYMPTOMS: Chronic | Status: ACTIVE | Noted: 2025-01-11

## 2025-01-27 PROBLEM — I42.8 OTHER CARDIOMYOPATHIES: Chronic | Status: ACTIVE | Noted: 2025-01-11

## 2025-01-29 ENCOUNTER — APPOINTMENT (OUTPATIENT)
Dept: ORTHOPEDIC SURGERY | Facility: CLINIC | Age: 70
End: 2025-01-29
Payer: COMMERCIAL

## 2025-01-29 DIAGNOSIS — S13.9XXA SPRAIN OF JOINTS AND LIGAMENTS OF UNSPECIFIED PARTS OF NECK, INITIAL ENCOUNTER: ICD-10-CM

## 2025-01-29 DIAGNOSIS — S29.9XXA UNSPECIFIED INJURY OF THORAX, INITIAL ENCOUNTER: ICD-10-CM

## 2025-01-29 DIAGNOSIS — M54.16 RADICULOPATHY, LUMBAR REGION: ICD-10-CM

## 2025-01-29 PROCEDURE — 72050 X-RAY EXAM NECK SPINE 4/5VWS: CPT

## 2025-01-29 PROCEDURE — 99214 OFFICE O/P EST MOD 30 MIN: CPT

## 2025-01-29 PROCEDURE — 72110 X-RAY EXAM L-2 SPINE 4/>VWS: CPT

## 2025-01-30 PROBLEM — S13.9XXA CERVICAL SPRAIN: Status: ACTIVE | Noted: 2025-01-30

## 2025-02-11 ENCOUNTER — APPOINTMENT (OUTPATIENT)
Facility: CLINIC | Age: 70
End: 2025-02-11
Payer: COMMERCIAL

## 2025-02-11 VITALS — SYSTOLIC BLOOD PRESSURE: 175 MMHG | DIASTOLIC BLOOD PRESSURE: 88 MMHG

## 2025-02-11 VITALS
BODY MASS INDEX: 27.97 KG/M2 | SYSTOLIC BLOOD PRESSURE: 180 MMHG | HEIGHT: 66 IN | WEIGHT: 174 LBS | DIASTOLIC BLOOD PRESSURE: 90 MMHG

## 2025-02-11 DIAGNOSIS — R42 DIZZINESS AND GIDDINESS: ICD-10-CM

## 2025-02-11 DIAGNOSIS — S06.9X0S UNSPECIFIED INTRACRANIAL INJURY W/OUT LOSS OF CONSCIOUSNESS, SEQUELA: ICD-10-CM

## 2025-02-11 DIAGNOSIS — I65.1 OCCLUSION AND STENOSIS OF BASILAR ARTERY: ICD-10-CM

## 2025-02-11 PROCEDURE — 99205 OFFICE O/P NEW HI 60 MIN: CPT

## 2025-02-12 ENCOUNTER — APPOINTMENT (OUTPATIENT)
Dept: ORTHOPEDIC SURGERY | Facility: CLINIC | Age: 70
End: 2025-02-12
Payer: MEDICARE

## 2025-02-12 DIAGNOSIS — M21.621 BUNIONETTE OF RIGHT FOOT: ICD-10-CM

## 2025-02-12 PROCEDURE — 99214 OFFICE O/P EST MOD 30 MIN: CPT | Mod: 25

## 2025-02-12 PROCEDURE — 20600 DRAIN/INJ JOINT/BURSA W/O US: CPT | Mod: RT

## 2025-02-12 RX ORDER — LIDOCAINE HYDROCHLORIDE 10 MG/ML
1 INJECTION, SOLUTION INFILTRATION; PERINEURAL
Refills: 0 | Status: COMPLETED | OUTPATIENT
Start: 2025-02-12

## 2025-02-12 RX ORDER — METHYLPRED ACET/NACL,ISO-OS/PF 40 MG/ML
40 VIAL (ML) INJECTION
Refills: 0 | Status: COMPLETED | OUTPATIENT
Start: 2025-02-12

## 2025-02-12 RX ADMIN — METHYLPREDNISOLONE ACETATE 0.75 MG/ML: 40 INJECTION, SUSPENSION INTRA-ARTICULAR; INTRALESIONAL; INTRAMUSCULAR; SOFT TISSUE at 00:00

## 2025-02-12 RX ADMIN — LIDOCAINE HYDROCHLORIDE 0.75 %: 10 INJECTION, SOLUTION INFILTRATION; PERINEURAL at 00:00

## 2025-02-25 ENCOUNTER — OUTPATIENT (OUTPATIENT)
Dept: OUTPATIENT SERVICES | Facility: HOSPITAL | Age: 70
LOS: 1 days | End: 2025-02-25
Payer: COMMERCIAL

## 2025-02-25 ENCOUNTER — APPOINTMENT (OUTPATIENT)
Dept: CT IMAGING | Facility: CLINIC | Age: 70
End: 2025-02-25
Payer: COMMERCIAL

## 2025-02-25 DIAGNOSIS — Z98.890 OTHER SPECIFIED POSTPROCEDURAL STATES: Chronic | ICD-10-CM

## 2025-02-25 DIAGNOSIS — Z98.89 OTHER SPECIFIED POSTPROCEDURAL STATES: Chronic | ICD-10-CM

## 2025-02-25 DIAGNOSIS — G54.2 CERVICAL ROOT DISORDERS, NOT ELSEWHERE CLASSIFIED: ICD-10-CM

## 2025-02-25 DIAGNOSIS — R40.1 STUPOR: ICD-10-CM

## 2025-02-25 DIAGNOSIS — Z95.810 PRESENCE OF AUTOMATIC (IMPLANTABLE) CARDIAC DEFIBRILLATOR: Chronic | ICD-10-CM

## 2025-02-25 DIAGNOSIS — R51.9 HEADACHE, UNSPECIFIED: ICD-10-CM

## 2025-02-25 DIAGNOSIS — G45.0 VERTEBRO-BASILAR ARTERY SYNDROME: ICD-10-CM

## 2025-02-25 PROCEDURE — 70450 CT HEAD/BRAIN W/O DYE: CPT | Mod: 26

## 2025-02-25 PROCEDURE — 70450 CT HEAD/BRAIN W/O DYE: CPT

## 2025-02-28 ENCOUNTER — OUTPATIENT (OUTPATIENT)
Dept: OUTPATIENT SERVICES | Facility: HOSPITAL | Age: 70
LOS: 1 days | End: 2025-02-28
Payer: MEDICARE

## 2025-02-28 ENCOUNTER — APPOINTMENT (OUTPATIENT)
Dept: CT IMAGING | Facility: IMAGING CENTER | Age: 70
End: 2025-02-28

## 2025-02-28 ENCOUNTER — LABORATORY RESULT (OUTPATIENT)
Age: 70
End: 2025-02-28

## 2025-02-28 ENCOUNTER — APPOINTMENT (OUTPATIENT)
Dept: CARDIOLOGY | Facility: CLINIC | Age: 70
End: 2025-02-28
Payer: MEDICARE

## 2025-02-28 VITALS
TEMPERATURE: 98.4 F | DIASTOLIC BLOOD PRESSURE: 70 MMHG | SYSTOLIC BLOOD PRESSURE: 124 MMHG | HEIGHT: 66 IN | BODY MASS INDEX: 28.12 KG/M2 | HEART RATE: 77 BPM | RESPIRATION RATE: 17 BRPM | WEIGHT: 175 LBS | OXYGEN SATURATION: 99 %

## 2025-02-28 DIAGNOSIS — S13.9XXA SPRAIN OF JOINTS AND LIGAMENTS OF UNSPECIFIED PARTS OF NECK, INITIAL ENCOUNTER: ICD-10-CM

## 2025-02-28 DIAGNOSIS — E78.5 HYPERLIPIDEMIA, UNSPECIFIED: ICD-10-CM

## 2025-02-28 DIAGNOSIS — R51.9 HEADACHE, UNSPECIFIED: ICD-10-CM

## 2025-02-28 DIAGNOSIS — I25.10 ATHEROSCLEROTIC HEART DISEASE OF NATIVE CORONARY ARTERY W/OUT ANGINA PECTORIS: ICD-10-CM

## 2025-02-28 DIAGNOSIS — S06.9X0S UNSPECIFIED INTRACRANIAL INJURY W/OUT LOSS OF CONSCIOUSNESS, SEQUELA: ICD-10-CM

## 2025-02-28 DIAGNOSIS — R97.20 ELEVATED PROSTATE, SPECIFIC ANTIGEN [PSA]: ICD-10-CM

## 2025-02-28 DIAGNOSIS — E27.9 DISORDER OF ADRENAL GLAND, UNSPECIFIED: ICD-10-CM

## 2025-02-28 DIAGNOSIS — R91.1 SOLITARY PULMONARY NODULE: ICD-10-CM

## 2025-02-28 DIAGNOSIS — E87.5 HYPERKALEMIA: ICD-10-CM

## 2025-02-28 DIAGNOSIS — F41.9 ANXIETY DISORDER, UNSPECIFIED: ICD-10-CM

## 2025-02-28 DIAGNOSIS — R07.9 CHEST PAIN, UNSPECIFIED: ICD-10-CM

## 2025-02-28 DIAGNOSIS — Z13.228 ENCOUNTER FOR SCREENING FOR OTHER METABOLIC DISORDERS: ICD-10-CM

## 2025-02-28 DIAGNOSIS — H53.9 UNSPECIFIED VISUAL DISTURBANCE: ICD-10-CM

## 2025-02-28 DIAGNOSIS — R42 DIZZINESS AND GIDDINESS: ICD-10-CM

## 2025-02-28 DIAGNOSIS — I65.1 OCCLUSION AND STENOSIS OF BASILAR ARTERY: ICD-10-CM

## 2025-02-28 DIAGNOSIS — I77.810 THORACIC AORTIC ECTASIA: ICD-10-CM

## 2025-02-28 DIAGNOSIS — I42.9 CARDIOMYOPATHY, UNSPECIFIED: ICD-10-CM

## 2025-02-28 DIAGNOSIS — Z98.890 OTHER SPECIFIED POSTPROCEDURAL STATES: Chronic | ICD-10-CM

## 2025-02-28 DIAGNOSIS — Z95.810 PRESENCE OF AUTOMATIC (IMPLANTABLE) CARDIAC DEFIBRILLATOR: ICD-10-CM

## 2025-02-28 DIAGNOSIS — R06.02 SHORTNESS OF BREATH: ICD-10-CM

## 2025-02-28 DIAGNOSIS — Z95.810 PRESENCE OF AUTOMATIC (IMPLANTABLE) CARDIAC DEFIBRILLATOR: Chronic | ICD-10-CM

## 2025-02-28 DIAGNOSIS — E11.9 TYPE 2 DIABETES MELLITUS W/OUT COMPLICATIONS: ICD-10-CM

## 2025-02-28 DIAGNOSIS — I10 ESSENTIAL (PRIMARY) HYPERTENSION: ICD-10-CM

## 2025-02-28 PROCEDURE — 99214 OFFICE O/P EST MOD 30 MIN: CPT

## 2025-02-28 PROCEDURE — 71275 CT ANGIOGRAPHY CHEST: CPT

## 2025-02-28 PROCEDURE — 93000 ELECTROCARDIOGRAM COMPLETE: CPT

## 2025-02-28 PROCEDURE — G2211 COMPLEX E/M VISIT ADD ON: CPT

## 2025-02-28 PROCEDURE — 71275 CT ANGIOGRAPHY CHEST: CPT | Mod: 26

## 2025-02-28 RX ORDER — FLUTICASONE FUROATE, UMECLIDINIUM BROMIDE AND VILANTEROL TRIFENATATE 200; 62.5; 25 UG/1; UG/1; UG/1
POWDER RESPIRATORY (INHALATION)
Refills: 0 | Status: ACTIVE | COMMUNITY

## 2025-03-01 LAB
ANION GAP SERPL CALC-SCNC: 16 MMOL/L
APPEARANCE: CLEAR
BACTERIA: NEGATIVE /HPF
BASOPHILS # BLD AUTO: 0.06 K/UL
BASOPHILS NFR BLD AUTO: 1.9 %
BILIRUBIN URINE: NEGATIVE
BLOOD URINE: NEGATIVE
BUN SERPL-MCNC: 20 MG/DL
CALCIUM SERPL-MCNC: 9.6 MG/DL
CAST: 0 /LPF
CHLORIDE SERPL-SCNC: 105 MMOL/L
CO2 SERPL-SCNC: 22 MMOL/L
COLOR: YELLOW
CREAT SERPL-MCNC: 1.05 MG/DL
EGFR: 76 ML/MIN/1.73M2
EOSINOPHIL # BLD AUTO: 0.12 K/UL
EOSINOPHIL NFR BLD AUTO: 3.9 %
EPITHELIAL CELLS: 1 /HPF
ESTIMATED AVERAGE GLUCOSE: 177 MG/DL
GLUCOSE QUALITATIVE U: >=1000 MG/DL
GLUCOSE SERPL-MCNC: 136 MG/DL
HBA1C MFR BLD HPLC: 7.8 %
HCT VFR BLD CALC: 49 %
HGB BLD-MCNC: 16.3 G/DL
KETONES URINE: NEGATIVE MG/DL
LEUKOCYTE ESTERASE URINE: NEGATIVE
LYMPHOCYTES # BLD AUTO: 1.05 K/UL
LYMPHOCYTES NFR BLD AUTO: 34 %
MAGNESIUM SERPL-MCNC: 2.1 MG/DL
MAN DIFF?: NORMAL
MCHC RBC-ENTMCNC: 29.6 PG
MCHC RBC-ENTMCNC: 33.3 G/DL
MCV RBC AUTO: 88.9 FL
MICROSCOPIC-UA: NORMAL
MONOCYTES # BLD AUTO: 0.48 K/UL
MONOCYTES NFR BLD AUTO: 15.5 %
NEUTROPHILS # BLD AUTO: 1.35 K/UL
NEUTROPHILS NFR BLD AUTO: 43.7 %
NITRITE URINE: NEGATIVE
NT-PROBNP SERPL-MCNC: 104 PG/ML
PH URINE: 6
PLATELET # BLD AUTO: 189 K/UL
POTASSIUM SERPL-SCNC: 4.7 MMOL/L
PROTEIN URINE: NEGATIVE MG/DL
RBC # BLD: 5.51 M/UL
RBC # FLD: 16 %
RED BLOOD CELLS URINE: 0 /HPF
SODIUM SERPL-SCNC: 142 MMOL/L
SPECIFIC GRAVITY URINE: >1.03
T4 FREE SERPL-MCNC: 1.2 NG/DL
TSH SERPL-ACNC: 1.79 UIU/ML
UROBILINOGEN URINE: 0.2 MG/DL
WBC # FLD AUTO: 3.09 K/UL
WHITE BLOOD CELLS URINE: 0 /HPF

## 2025-03-12 ENCOUNTER — APPOINTMENT (OUTPATIENT)
Dept: CT IMAGING | Facility: CLINIC | Age: 70
End: 2025-03-12
Payer: COMMERCIAL

## 2025-03-12 ENCOUNTER — OUTPATIENT (OUTPATIENT)
Dept: OUTPATIENT SERVICES | Facility: HOSPITAL | Age: 70
LOS: 1 days | End: 2025-03-12
Payer: COMMERCIAL

## 2025-03-12 DIAGNOSIS — Z98.890 OTHER SPECIFIED POSTPROCEDURAL STATES: Chronic | ICD-10-CM

## 2025-03-12 DIAGNOSIS — Z95.810 PRESENCE OF AUTOMATIC (IMPLANTABLE) CARDIAC DEFIBRILLATOR: Chronic | ICD-10-CM

## 2025-03-12 DIAGNOSIS — Z00.8 ENCOUNTER FOR OTHER GENERAL EXAMINATION: ICD-10-CM

## 2025-03-12 DIAGNOSIS — Z98.1 ARTHRODESIS STATUS: Chronic | ICD-10-CM

## 2025-03-12 DIAGNOSIS — Z98.89 OTHER SPECIFIED POSTPROCEDURAL STATES: Chronic | ICD-10-CM

## 2025-03-12 DIAGNOSIS — S55.001A UNSPECIFIED INJURY OF ULNAR ARTERY AT FOREARM LEVEL, RIGHT ARM, INITIAL ENCOUNTER: Chronic | ICD-10-CM

## 2025-03-12 PROCEDURE — 72125 CT NECK SPINE W/O DYE: CPT

## 2025-03-12 PROCEDURE — 72125 CT NECK SPINE W/O DYE: CPT | Mod: 26

## 2025-04-01 ENCOUNTER — APPOINTMENT (OUTPATIENT)
Dept: ENDOCRINOLOGY | Facility: CLINIC | Age: 70
End: 2025-04-01

## 2025-04-09 ENCOUNTER — APPOINTMENT (OUTPATIENT)
Dept: HEART FAILURE | Facility: CLINIC | Age: 70
End: 2025-04-09

## 2025-04-09 NOTE — PROGRESS NOTE ADULT - PROBLEM SELECTOR PROBLEM 4
Received request via: Pharmacy    Was the patient seen in the last year in this department? Yes  LOV : 4/24/2024   Does the patient have an active prescription (recently filled or refills available) for medication(s) requested? No    Pharm[facility] Name: [facility]    Does the patient have [facility] and need 100-day supply? (This applies to ALL medications) Patient does not have SCP  
Chronic GERD
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
## 2025-04-22 ENCOUNTER — NON-APPOINTMENT (OUTPATIENT)
Age: 70
End: 2025-04-22

## 2025-04-24 ENCOUNTER — APPOINTMENT (OUTPATIENT)
Dept: HEART FAILURE | Facility: CLINIC | Age: 70
End: 2025-04-24

## 2025-04-24 VITALS
RESPIRATION RATE: 20 BRPM | HEIGHT: 66 IN | DIASTOLIC BLOOD PRESSURE: 78 MMHG | WEIGHT: 172 LBS | TEMPERATURE: 98.2 F | SYSTOLIC BLOOD PRESSURE: 116 MMHG | OXYGEN SATURATION: 98 % | BODY MASS INDEX: 27.64 KG/M2 | HEART RATE: 84 BPM

## 2025-04-24 DIAGNOSIS — I10 ESSENTIAL (PRIMARY) HYPERTENSION: ICD-10-CM

## 2025-04-24 DIAGNOSIS — I42.9 CARDIOMYOPATHY, UNSPECIFIED: ICD-10-CM

## 2025-04-24 DIAGNOSIS — E11.9 TYPE 2 DIABETES MELLITUS W/OUT COMPLICATIONS: ICD-10-CM

## 2025-04-24 DIAGNOSIS — I70.90 UNSPECIFIED ATHEROSCLEROSIS: ICD-10-CM

## 2025-04-24 DIAGNOSIS — I50.22 CHRONIC SYSTOLIC (CONGESTIVE) HEART FAILURE: ICD-10-CM

## 2025-04-24 PROCEDURE — 99214 OFFICE O/P EST MOD 30 MIN: CPT

## 2025-04-29 ENCOUNTER — APPOINTMENT (OUTPATIENT)
Dept: ELECTROPHYSIOLOGY | Facility: CLINIC | Age: 70
End: 2025-04-29
Payer: MEDICARE

## 2025-04-29 ENCOUNTER — NON-APPOINTMENT (OUTPATIENT)
Age: 70
End: 2025-04-29

## 2025-04-29 PROCEDURE — 93296 REM INTERROG EVL PM/IDS: CPT

## 2025-04-29 PROCEDURE — 93295 DEV INTERROG REMOTE 1/2/MLT: CPT

## 2025-05-01 ENCOUNTER — RX CHANGE (OUTPATIENT)
Age: 70
End: 2025-05-01

## 2025-05-05 ENCOUNTER — APPOINTMENT (OUTPATIENT)
Dept: CARDIOLOGY | Facility: CLINIC | Age: 70
End: 2025-05-05
Payer: MEDICARE

## 2025-05-05 VITALS
HEART RATE: 78 BPM | DIASTOLIC BLOOD PRESSURE: 80 MMHG | WEIGHT: 174 LBS | OXYGEN SATURATION: 97 % | SYSTOLIC BLOOD PRESSURE: 120 MMHG | BODY MASS INDEX: 28.08 KG/M2

## 2025-05-05 PROCEDURE — 99214 OFFICE O/P EST MOD 30 MIN: CPT

## 2025-05-05 PROCEDURE — G2211 COMPLEX E/M VISIT ADD ON: CPT

## 2025-05-05 PROCEDURE — G0537: CPT

## 2025-05-08 LAB
ALBUMIN SERPL ELPH-MCNC: 4.5 G/DL
ALP BLD-CCNC: 49 U/L
ALT SERPL-CCNC: 14 U/L
ANION GAP SERPL CALC-SCNC: 14 MMOL/L
AST SERPL-CCNC: 23 U/L
BILIRUB SERPL-MCNC: 0.6 MG/DL
BUN SERPL-MCNC: 18 MG/DL
CALCIUM SERPL-MCNC: 9.8 MG/DL
CHLORIDE SERPL-SCNC: 104 MMOL/L
CHOLEST SERPL-MCNC: 177 MG/DL
CO2 SERPL-SCNC: 21 MMOL/L
CREAT SERPL-MCNC: 0.93 MG/DL
EGFRCR SERPLBLD CKD-EPI 2021: 88 ML/MIN/1.73M2
GLUCOSE SERPL-MCNC: 149 MG/DL
HDLC SERPL-MCNC: 81 MG/DL
LDLC SERPL-MCNC: 80 MG/DL
NONHDLC SERPL-MCNC: 96 MG/DL
POTASSIUM SERPL-SCNC: 4.5 MMOL/L
PROT SERPL-MCNC: 7 G/DL
SODIUM SERPL-SCNC: 140 MMOL/L
TRIGL SERPL-MCNC: 85 MG/DL

## 2025-05-13 ENCOUNTER — LABORATORY RESULT (OUTPATIENT)
Age: 70
End: 2025-05-13

## 2025-05-13 ENCOUNTER — APPOINTMENT (OUTPATIENT)
Dept: CARDIOLOGY | Facility: CLINIC | Age: 70
End: 2025-05-13
Payer: MEDICARE

## 2025-05-13 VITALS
TEMPERATURE: 98 F | HEART RATE: 71 BPM | BODY MASS INDEX: 28.93 KG/M2 | OXYGEN SATURATION: 98 % | DIASTOLIC BLOOD PRESSURE: 68 MMHG | WEIGHT: 180 LBS | HEIGHT: 66 IN | SYSTOLIC BLOOD PRESSURE: 120 MMHG

## 2025-05-13 DIAGNOSIS — E27.9 DISORDER OF ADRENAL GLAND, UNSPECIFIED: ICD-10-CM

## 2025-05-13 DIAGNOSIS — R91.1 SOLITARY PULMONARY NODULE: ICD-10-CM

## 2025-05-13 DIAGNOSIS — R06.02 SHORTNESS OF BREATH: ICD-10-CM

## 2025-05-13 DIAGNOSIS — R07.9 CHEST PAIN, UNSPECIFIED: ICD-10-CM

## 2025-05-13 DIAGNOSIS — F41.9 ANXIETY DISORDER, UNSPECIFIED: ICD-10-CM

## 2025-05-13 DIAGNOSIS — I10 ESSENTIAL (PRIMARY) HYPERTENSION: ICD-10-CM

## 2025-05-13 DIAGNOSIS — E78.5 HYPERLIPIDEMIA, UNSPECIFIED: ICD-10-CM

## 2025-05-13 DIAGNOSIS — H53.9 UNSPECIFIED VISUAL DISTURBANCE: ICD-10-CM

## 2025-05-13 DIAGNOSIS — R42 DIZZINESS AND GIDDINESS: ICD-10-CM

## 2025-05-13 DIAGNOSIS — S06.9X0S UNSPECIFIED INTRACRANIAL INJURY W/OUT LOSS OF CONSCIOUSNESS, SEQUELA: ICD-10-CM

## 2025-05-13 DIAGNOSIS — I42.9 CARDIOMYOPATHY, UNSPECIFIED: ICD-10-CM

## 2025-05-13 DIAGNOSIS — R97.20 ELEVATED PROSTATE, SPECIFIC ANTIGEN [PSA]: ICD-10-CM

## 2025-05-13 DIAGNOSIS — S13.9XXA SPRAIN OF JOINTS AND LIGAMENTS OF UNSPECIFIED PARTS OF NECK, INITIAL ENCOUNTER: ICD-10-CM

## 2025-05-13 DIAGNOSIS — I65.1 OCCLUSION AND STENOSIS OF BASILAR ARTERY: ICD-10-CM

## 2025-05-13 DIAGNOSIS — Z13.228 ENCOUNTER FOR SCREENING FOR OTHER METABOLIC DISORDERS: ICD-10-CM

## 2025-05-13 DIAGNOSIS — I25.10 ATHEROSCLEROTIC HEART DISEASE OF NATIVE CORONARY ARTERY W/OUT ANGINA PECTORIS: ICD-10-CM

## 2025-05-13 DIAGNOSIS — E11.9 TYPE 2 DIABETES MELLITUS W/OUT COMPLICATIONS: ICD-10-CM

## 2025-05-13 DIAGNOSIS — Z95.810 PRESENCE OF AUTOMATIC (IMPLANTABLE) CARDIAC DEFIBRILLATOR: ICD-10-CM

## 2025-05-13 DIAGNOSIS — I77.810 THORACIC AORTIC ECTASIA: ICD-10-CM

## 2025-05-13 PROCEDURE — 93000 ELECTROCARDIOGRAM COMPLETE: CPT

## 2025-05-13 PROCEDURE — 99214 OFFICE O/P EST MOD 30 MIN: CPT

## 2025-05-13 PROCEDURE — G2211 COMPLEX E/M VISIT ADD ON: CPT

## 2025-05-14 LAB
ANION GAP SERPL CALC-SCNC: 15 MMOL/L
APPEARANCE: CLEAR
BACTERIA: NEGATIVE /HPF
BASOPHILS # BLD AUTO: 0 K/UL
BASOPHILS NFR BLD AUTO: 0 %
BILIRUBIN URINE: NEGATIVE
BLOOD URINE: NEGATIVE
BUN SERPL-MCNC: 17 MG/DL
CALCIUM SERPL-MCNC: 9.3 MG/DL
CAST: 0 /LPF
CHLORIDE SERPL-SCNC: 106 MMOL/L
CO2 SERPL-SCNC: 21 MMOL/L
COLOR: YELLOW
CREAT SERPL-MCNC: 1 MG/DL
EGFRCR SERPLBLD CKD-EPI 2021: 81 ML/MIN/1.73M2
EOSINOPHIL # BLD AUTO: 0.09 K/UL
EOSINOPHIL NFR BLD AUTO: 3.5 %
EPITHELIAL CELLS: 0 /HPF
ESTIMATED AVERAGE GLUCOSE: 166 MG/DL
GLUCOSE QUALITATIVE U: >=1000 MG/DL
GLUCOSE SERPL-MCNC: 147 MG/DL
HBA1C MFR BLD HPLC: 7.4 %
HCT VFR BLD CALC: 47.2 %
HGB BLD-MCNC: 14.9 G/DL
KETONES URINE: NEGATIVE MG/DL
LEUKOCYTE ESTERASE URINE: NEGATIVE
LYMPHOCYTES # BLD AUTO: 1.29 K/UL
LYMPHOCYTES NFR BLD AUTO: 48.8 %
MAGNESIUM SERPL-MCNC: 2.2 MG/DL
MAN DIFF?: NORMAL
MCHC RBC-ENTMCNC: 28.5 PG
MCHC RBC-ENTMCNC: 31.6 G/DL
MCV RBC AUTO: 90.4 FL
MICROSCOPIC-UA: NORMAL
MONOCYTES # BLD AUTO: 0.45 K/UL
MONOCYTES NFR BLD AUTO: 16.9 %
NEUTROPHILS # BLD AUTO: 0.82 K/UL
NEUTROPHILS NFR BLD AUTO: 30.8 %
NITRITE URINE: NEGATIVE
NT-PROBNP SERPL-MCNC: 111 PG/ML
PH URINE: 6
PLATELET # BLD AUTO: 190 K/UL
POTASSIUM SERPL-SCNC: 4.5 MMOL/L
PROTEIN URINE: NEGATIVE MG/DL
RBC # BLD: 5.22 M/UL
RBC # FLD: 15.7 %
RED BLOOD CELLS URINE: 0 /HPF
SODIUM SERPL-SCNC: 142 MMOL/L
SPECIFIC GRAVITY URINE: >1.03
T3FREE SERPL-MCNC: 2.8 PG/ML
T4 FREE SERPL-MCNC: 1.1 NG/DL
TSH SERPL-ACNC: 2 UIU/ML
UROBILINOGEN URINE: 0.2 MG/DL
WBC # FLD AUTO: 2.65 K/UL
WHITE BLOOD CELLS URINE: 0 /HPF

## 2025-06-25 ENCOUNTER — NON-APPOINTMENT (OUTPATIENT)
Age: 70
End: 2025-06-25

## 2025-06-25 ENCOUNTER — APPOINTMENT (OUTPATIENT)
Dept: UROLOGY | Facility: CLINIC | Age: 70
End: 2025-06-25
Payer: MEDICARE

## 2025-06-25 VITALS — SYSTOLIC BLOOD PRESSURE: 149 MMHG | HEART RATE: 74 BPM | TEMPERATURE: 97.2 F | DIASTOLIC BLOOD PRESSURE: 84 MMHG

## 2025-06-25 PROCEDURE — G2211 COMPLEX E/M VISIT ADD ON: CPT

## 2025-06-25 PROCEDURE — 99213 OFFICE O/P EST LOW 20 MIN: CPT

## 2025-06-26 LAB
PSA FREE FLD-MCNC: 33 %
PSA FREE SERPL-MCNC: 1.4 NG/ML
PSA SERPL-MCNC: 4.27 NG/ML

## 2025-06-26 NOTE — H&P PST ADULT - VENOUS THROMBOEMBOLISM
"Patients spouse called in worried about his eliquis. Patient held eliquis 6/21 until present for \"Prostatectomy, Suprapubic Laparoscopic with Robotics(Abdomen)   Diverticulectomy Bladder/Urethral Laparoscopic with Robotics (Flank) \" surgery.  Spouse is stating Dr Banuelos wants patient to continue to hold his eliquis until 6/30/25. Spouse is questioning if this is safe and recommended by EP or if they should start taking eliquis today.  " no

## 2025-06-30 ENCOUNTER — APPOINTMENT (OUTPATIENT)
Dept: CARDIOLOGY | Facility: CLINIC | Age: 70
End: 2025-06-30

## 2025-07-22 ENCOUNTER — APPOINTMENT (OUTPATIENT)
Dept: ELECTROPHYSIOLOGY | Facility: CLINIC | Age: 70
End: 2025-07-22
Payer: MEDICARE

## 2025-07-22 ENCOUNTER — NON-APPOINTMENT (OUTPATIENT)
Age: 70
End: 2025-07-22

## 2025-07-22 VITALS — OXYGEN SATURATION: 95 % | HEART RATE: 70 BPM | SYSTOLIC BLOOD PRESSURE: 167 MMHG | DIASTOLIC BLOOD PRESSURE: 88 MMHG

## 2025-07-22 PROCEDURE — 93284 PRGRMG EVAL IMPLANTABLE DFB: CPT

## 2025-07-22 PROCEDURE — 93000 ELECTROCARDIOGRAM COMPLETE: CPT | Mod: 59

## 2025-08-01 ENCOUNTER — APPOINTMENT (OUTPATIENT)
Dept: INTERNAL MEDICINE | Facility: CLINIC | Age: 70
End: 2025-08-01
Payer: MEDICARE

## 2025-08-01 VITALS
OXYGEN SATURATION: 97 % | WEIGHT: 177 LBS | TEMPERATURE: 97.4 F | BODY MASS INDEX: 28.45 KG/M2 | HEART RATE: 76 BPM | SYSTOLIC BLOOD PRESSURE: 140 MMHG | DIASTOLIC BLOOD PRESSURE: 74 MMHG | HEIGHT: 66 IN

## 2025-08-01 DIAGNOSIS — I47.29 OTHER VENTRICULAR TACHYCARDIA: ICD-10-CM

## 2025-08-01 DIAGNOSIS — E27.9 DISORDER OF ADRENAL GLAND, UNSPECIFIED: ICD-10-CM

## 2025-08-01 DIAGNOSIS — I65.1 OCCLUSION AND STENOSIS OF BASILAR ARTERY: ICD-10-CM

## 2025-08-01 DIAGNOSIS — R07.9 CHEST PAIN, UNSPECIFIED: ICD-10-CM

## 2025-08-01 DIAGNOSIS — I77.810 THORACIC AORTIC ECTASIA: ICD-10-CM

## 2025-08-01 DIAGNOSIS — I42.9 CARDIOMYOPATHY, UNSPECIFIED: ICD-10-CM

## 2025-08-01 DIAGNOSIS — F41.9 ANXIETY DISORDER, UNSPECIFIED: ICD-10-CM

## 2025-08-01 DIAGNOSIS — Z95.810 PRESENCE OF AUTOMATIC (IMPLANTABLE) CARDIAC DEFIBRILLATOR: ICD-10-CM

## 2025-08-01 PROCEDURE — 99204 OFFICE O/P NEW MOD 45 MIN: CPT

## 2025-08-01 PROCEDURE — G2211 COMPLEX E/M VISIT ADD ON: CPT

## 2025-08-01 PROCEDURE — 93000 ELECTROCARDIOGRAM COMPLETE: CPT

## 2025-08-03 PROBLEM — I47.29 NSVT (NONSUSTAINED VENTRICULAR TACHYCARDIA): Status: ACTIVE | Noted: 2025-08-01

## 2025-08-07 ENCOUNTER — LABORATORY RESULT (OUTPATIENT)
Age: 70
End: 2025-08-07

## 2025-08-07 LAB
ALBUMIN SERPL ELPH-MCNC: 4.2 G/DL
ALP BLD-CCNC: 51 U/L
ALT SERPL-CCNC: 15 U/L
ANION GAP SERPL CALC-SCNC: 14 MMOL/L
AST SERPL-CCNC: 24 U/L
BASOPHILS # BLD AUTO: 0.01 K/UL
BASOPHILS NFR BLD AUTO: 0.4 %
BILIRUB SERPL-MCNC: 0.5 MG/DL
BUN SERPL-MCNC: 17 MG/DL
CALCIUM SERPL-MCNC: 9.7 MG/DL
CHLORIDE SERPL-SCNC: 108 MMOL/L
CK SERPL-CCNC: 249 U/L
CO2 SERPL-SCNC: 21 MMOL/L
CREAT SERPL-MCNC: 0.97 MG/DL
EGFRCR SERPLBLD CKD-EPI 2021: 84 ML/MIN/1.73M2
EOSINOPHIL # BLD AUTO: 0.06 K/UL
EOSINOPHIL NFR BLD AUTO: 2.3 %
GLUCOSE SERPL-MCNC: 163 MG/DL
HCT VFR BLD CALC: 47.6 %
HGB BLD-MCNC: 15.5 G/DL
IMM GRANULOCYTES NFR BLD AUTO: 0 %
LYMPHOCYTES # BLD AUTO: 0.94 K/UL
LYMPHOCYTES NFR BLD AUTO: 35.3 %
MAN DIFF?: NORMAL
MCHC RBC-ENTMCNC: 27.4 PG
MCHC RBC-ENTMCNC: 32.6 G/DL
MCV RBC AUTO: 84.1 FL
MONOCYTES # BLD AUTO: 0.5 K/UL
MONOCYTES NFR BLD AUTO: 18.8 %
NEUTROPHILS # BLD AUTO: 1.15 K/UL
NEUTROPHILS NFR BLD AUTO: 43.2 %
PLATELET # BLD AUTO: 169 K/UL
POTASSIUM SERPL-SCNC: 4.1 MMOL/L
PROT SERPL-MCNC: 6.8 G/DL
RBC # BLD: 5.66 M/UL
RBC # FLD: 18.1 %
SODIUM SERPL-SCNC: 143 MMOL/L
T4 FREE SERPL-MCNC: 1.2 NG/DL
TSH SERPL-ACNC: 1.87 UIU/ML
WBC # FLD AUTO: 2.66 K/UL

## 2025-08-14 ENCOUNTER — APPOINTMENT (OUTPATIENT)
Dept: CT IMAGING | Facility: CLINIC | Age: 70
End: 2025-08-14
Payer: MEDICARE

## 2025-08-14 ENCOUNTER — OUTPATIENT (OUTPATIENT)
Dept: OUTPATIENT SERVICES | Facility: HOSPITAL | Age: 70
LOS: 1 days | End: 2025-08-14
Payer: MEDICARE

## 2025-08-14 DIAGNOSIS — Z98.890 OTHER SPECIFIED POSTPROCEDURAL STATES: Chronic | ICD-10-CM

## 2025-08-14 DIAGNOSIS — S55.001A UNSPECIFIED INJURY OF ULNAR ARTERY AT FOREARM LEVEL, RIGHT ARM, INITIAL ENCOUNTER: Chronic | ICD-10-CM

## 2025-08-14 DIAGNOSIS — Z95.810 PRESENCE OF AUTOMATIC (IMPLANTABLE) CARDIAC DEFIBRILLATOR: Chronic | ICD-10-CM

## 2025-08-14 DIAGNOSIS — R07.9 CHEST PAIN, UNSPECIFIED: ICD-10-CM

## 2025-08-14 DIAGNOSIS — Z00.8 ENCOUNTER FOR OTHER GENERAL EXAMINATION: ICD-10-CM

## 2025-08-14 DIAGNOSIS — I42.9 CARDIOMYOPATHY, UNSPECIFIED: ICD-10-CM

## 2025-08-14 DIAGNOSIS — Z98.1 ARTHRODESIS STATUS: Chronic | ICD-10-CM

## 2025-08-14 DIAGNOSIS — I47.29 OTHER VENTRICULAR TACHYCARDIA: ICD-10-CM

## 2025-08-14 DIAGNOSIS — Z98.89 OTHER SPECIFIED POSTPROCEDURAL STATES: Chronic | ICD-10-CM

## 2025-08-14 PROCEDURE — 75574 CT ANGIO HRT W/3D IMAGE: CPT | Mod: 26

## 2025-08-14 PROCEDURE — 75574 CT ANGIO HRT W/3D IMAGE: CPT

## 2025-08-19 ENCOUNTER — APPOINTMENT (OUTPATIENT)
Dept: PULMONOLOGY | Facility: CLINIC | Age: 70
End: 2025-08-19

## 2025-08-25 ENCOUNTER — RX RENEWAL (OUTPATIENT)
Age: 70
End: 2025-08-25

## 2025-08-25 ENCOUNTER — APPOINTMENT (OUTPATIENT)
Dept: UROLOGY | Facility: CLINIC | Age: 70
End: 2025-08-25
Payer: MEDICARE

## 2025-08-25 VITALS — HEART RATE: 76 BPM | RESPIRATION RATE: 17 BRPM | DIASTOLIC BLOOD PRESSURE: 84 MMHG | SYSTOLIC BLOOD PRESSURE: 144 MMHG

## 2025-08-25 DIAGNOSIS — R21 RASH AND OTHER NONSPECIFIC SKIN ERUPTION: ICD-10-CM

## 2025-08-25 PROCEDURE — 99214 OFFICE O/P EST MOD 30 MIN: CPT | Mod: 25

## 2025-08-25 PROCEDURE — 51798 US URINE CAPACITY MEASURE: CPT

## 2025-08-25 RX ORDER — CLOTRIMAZOLE AND BETAMETHASONE DIPROPIONATE 10; .5 MG/G; MG/G
1-0.05 CREAM TOPICAL TWICE DAILY
Qty: 1 | Refills: 2 | Status: ACTIVE | COMMUNITY
Start: 2025-08-25 | End: 1900-01-01

## 2025-08-26 LAB
APPEARANCE: CLEAR
BACTERIA: NEGATIVE /HPF
BILIRUBIN URINE: NEGATIVE
BLOOD URINE: NEGATIVE
CAST: 0 /LPF
COLOR: YELLOW
EPITHELIAL CELLS: 0 /HPF
GLUCOSE QUALITATIVE U: >=1000 MG/DL
KETONES URINE: NEGATIVE MG/DL
LEUKOCYTE ESTERASE URINE: NEGATIVE
MICROSCOPIC-UA: NORMAL
NITRITE URINE: NEGATIVE
PH URINE: 6
PROTEIN URINE: NEGATIVE MG/DL
RED BLOOD CELLS URINE: 0 /HPF
SPECIFIC GRAVITY URINE: >1.03
UROBILINOGEN URINE: 0.2 MG/DL
WHITE BLOOD CELLS URINE: 0 /HPF

## 2025-08-27 LAB — BACTERIA UR CULT: NORMAL

## 2025-09-11 ENCOUNTER — APPOINTMENT (OUTPATIENT)
Dept: RADIOLOGY | Facility: HOSPITAL | Age: 70
End: 2025-09-11

## 2025-09-17 ENCOUNTER — APPOINTMENT (OUTPATIENT)
Dept: ORTHOPEDIC SURGERY | Facility: CLINIC | Age: 70
End: 2025-09-17
Payer: MEDICARE

## 2025-09-17 DIAGNOSIS — M50.20 OTHER CERVICAL DISC DISPLACEMENT, UNSPECIFIED CERVICAL REGION: ICD-10-CM

## 2025-09-17 DIAGNOSIS — M54.16 RADICULOPATHY, LUMBAR REGION: ICD-10-CM

## 2025-09-17 PROCEDURE — 99214 OFFICE O/P EST MOD 30 MIN: CPT

## (undated) DEVICE — GLV 8.5 PROTEXIS (WHITE)

## (undated) DEVICE — DRAIN PENROSE .5" X 18" LATEX

## (undated) DEVICE — GLV 8 PROTEXIS (WHITE)

## (undated) DEVICE — DRAPE C ARM UNIVERSAL

## (undated) DEVICE — TUBING SUCTION CONN 6FT STERILE

## (undated) DEVICE — LONE STAR ELASTIC STAY HOOK 5MM SHARP

## (undated) DEVICE — DRSG STERISTRIPS 0.5 X 4"

## (undated) DEVICE — VENODYNE/SCD SLEEVE CALF LARGE

## (undated) DEVICE — CATH IV SAFE BC 20G X 1.16" (PINK)

## (undated) DEVICE — SUCTION YANKAUER OPEN TIP NO VENT CURVE

## (undated) DEVICE — SUCTION YANKAUER NO CONTROL VENT

## (undated) DEVICE — SUT MAXON 1 60" T-60

## (undated) DEVICE — SUT MONOCRYL 4-0 18" PS-2

## (undated) DEVICE — FORCEP RADIAL JAW 4 JUMBO 2.8MM 3.2MM 240CM ORANGE DISP

## (undated) DEVICE — POLY TRAP ETRAP

## (undated) DEVICE — FOLEY HOLDER STATLOCK 2 WAY ADULT

## (undated) DEVICE — TUBING IV SET GRAVITY 3Y 100" MACRO

## (undated) DEVICE — SUT POLYSORB 0 30" GS-23

## (undated) DEVICE — SNARE OVAL LOOP MICOR

## (undated) DEVICE — SUT POLYSORB 1 36" GS-11 UNDYED

## (undated) DEVICE — CLAMP BX HOT RAD JAW 3

## (undated) DEVICE — SOL IRR POUR NS 0.9% 500ML

## (undated) DEVICE — GLV 6.5 PROTEXIS (WHITE)

## (undated) DEVICE — PACK BASIC DISP

## (undated) DEVICE — ELCTR PENCIL NEPTUNE SMOKE EVACUATION

## (undated) DEVICE — ELCTR GROUNDING PAD ADULT COVIDIEN

## (undated) DEVICE — BRUSH COLONOSCOPY CYTOLOGY

## (undated) DEVICE — SUT DERMABOND PRINEO 60CM

## (undated) DEVICE — PACK IV START WITH CHG

## (undated) DEVICE — SYR ALLIANCE II INFLATION 60ML

## (undated) DEVICE — Device

## (undated) DEVICE — SUT POLYSORB 0 18" GS-21

## (undated) DEVICE — SOL IRR POUR H2O 250ML

## (undated) DEVICE — GOWN TRIMAX LG

## (undated) DEVICE — CATH IV SAFE BC 22G X 1" (BLUE)

## (undated) DEVICE — IRRIGATOR BIO SHIELD

## (undated) DEVICE — DRAPE TOWEL BLUE 17" X 24"

## (undated) DEVICE — SUT POLYSORB 3-0 30" V-20 UNDYED

## (undated) DEVICE — BLADE SCALPEL SAFETYLOCK #15

## (undated) DEVICE — BITE BLOCK ADULT 20 X 27MM (GREEN)

## (undated) DEVICE — MARKING PEN W RULER

## (undated) DEVICE — DRAPE 3/4 SHEET 52X76"

## (undated) DEVICE — POSITIONER FOAM EGG CRATE ULNAR 2PCS (PINK)

## (undated) DEVICE — GLV 7 PROTEXIS (WHITE)

## (undated) DEVICE — WARMING BLANKET UPPER ADULT

## (undated) DEVICE — PACK SPINE

## (undated) DEVICE — DRAPE ISOLATION W IOBAN & POUCH

## (undated) DEVICE — BIOPSY FORCEP RADIAL JAW 4 STANDARD WITH NEEDLE

## (undated) DEVICE — SPONGE ENDO PEANUT 5MM

## (undated) DEVICE — GLV 7.5 PROTEXIS (WHITE)

## (undated) DEVICE — STAPLER SKIN VISI-STAT 35 WIDE

## (undated) DEVICE — BLADE SURGICAL #15 CARBON

## (undated) DEVICE — SUT POLYSORB 2-0 30" V-20 UNDYED

## (undated) DEVICE — BLADE SCALPEL SAFETYLOCK #10

## (undated) DEVICE — DRSG CURITY GAUZE SPONGE 4 X 4" 12-PLY

## (undated) DEVICE — TUBING SUCTION 20FT

## (undated) DEVICE — PREP CHLORAPREP HI-LITE ORANGE 26ML

## (undated) DEVICE — BALLOON US ENDO

## (undated) DEVICE — SYR LUER LOK 50CC

## (undated) DEVICE — SUT POLYSORB 3-0 18" P-12 UNDYED

## (undated) DEVICE — SOL INJ NS 0.9% 500ML 2 PORT

## (undated) DEVICE — PACK GENERAL MINOR

## (undated) DEVICE — LONE STAR RETRACTOR SQUARE 14.1CMX14.1CM DISP

## (undated) DEVICE — SPECIMEN CONTAINER 100ML

## (undated) DEVICE — DRSG TEGADERM 6"X8"

## (undated) DEVICE — DRSG STERISTRIPS 0.5X4"

## (undated) DEVICE — ABDOMINAL BINDER XL 9" X 62"-74"

## (undated) DEVICE — DRAPE INSTRUMENT POUCH 6.75" X 11"

## (undated) DEVICE — DRAIN PENROSE .25" X 18" LATEX

## (undated) DEVICE — SUT POLYSORB 2-0 30" GS-11 UNDYED

## (undated) DEVICE — SENSOR O2 FINGER ADULT

## (undated) DEVICE — DRAPE MAYO STAND 30"

## (undated) DEVICE — CATH IV SAFE INSYTE 14G X 1.75" (ORANGE)

## (undated) DEVICE — MEDICATION LABELS W MARKER